# Patient Record
Sex: FEMALE | Race: WHITE | NOT HISPANIC OR LATINO | Employment: UNEMPLOYED | URBAN - NONMETROPOLITAN AREA
[De-identification: names, ages, dates, MRNs, and addresses within clinical notes are randomized per-mention and may not be internally consistent; named-entity substitution may affect disease eponyms.]

---

## 2017-01-18 ENCOUNTER — OFFICE VISIT (OUTPATIENT)
Dept: FAMILY MEDICINE CLINIC | Facility: CLINIC | Age: 42
End: 2017-01-18

## 2017-01-18 VITALS
DIASTOLIC BLOOD PRESSURE: 77 MMHG | OXYGEN SATURATION: 99 % | WEIGHT: 155.2 LBS | BODY MASS INDEX: 24.94 KG/M2 | HEIGHT: 66 IN | TEMPERATURE: 98.6 F | HEART RATE: 80 BPM | SYSTOLIC BLOOD PRESSURE: 123 MMHG

## 2017-01-18 DIAGNOSIS — N30.90 CYSTITIS: Primary | ICD-10-CM

## 2017-01-18 LAB
BILIRUB BLD-MCNC: NEGATIVE MG/DL
COLOR UR: ABNORMAL
GLUCOSE UR STRIP-MCNC: NEGATIVE MG/DL
KETONES UR QL: NEGATIVE
LEUKOCYTE EST, POC: NEGATIVE
NITRITE UR-MCNC: NEGATIVE MG/ML
PH UR: 5.5 [PH] (ref 5–8)
PROT UR STRIP-MCNC: NEGATIVE MG/DL
RBC # UR STRIP: ABNORMAL /UL
SP GR UR: 1.01 (ref 1–1.03)
UROBILINOGEN UR QL: NORMAL

## 2017-01-18 PROCEDURE — 99213 OFFICE O/P EST LOW 20 MIN: CPT | Performed by: NURSE PRACTITIONER

## 2017-01-18 PROCEDURE — 87086 URINE CULTURE/COLONY COUNT: CPT | Performed by: NURSE PRACTITIONER

## 2017-01-18 PROCEDURE — 81003 URINALYSIS AUTO W/O SCOPE: CPT | Performed by: NURSE PRACTITIONER

## 2017-01-18 RX ORDER — LIDOCAINE AND PRILOCAINE 25; 25 MG/G; MG/G
1 CREAM TOPICAL 2 TIMES DAILY
COMMUNITY
Start: 2016-12-30 | End: 2018-04-16

## 2017-01-18 RX ORDER — FERROUS SULFATE TAB EC 324 MG (65 MG FE EQUIVALENT) 324 (65 FE) MG
325 TABLET DELAYED RESPONSE ORAL DAILY
Refills: 6 | COMMUNITY
Start: 2017-01-12 | End: 2018-04-16

## 2017-01-18 RX ORDER — PREDNISONE 10 MG/1
10 TABLET ORAL DAILY
Refills: 0 | COMMUNITY
Start: 2017-01-12 | End: 2017-05-01

## 2017-01-18 RX ORDER — MAGNESIUM 200 MG
1 TABLET ORAL
Refills: 12 | COMMUNITY
Start: 2017-01-12 | End: 2018-04-16

## 2017-01-18 RX ORDER — CIPROFLOXACIN 500 MG/1
500 TABLET, FILM COATED ORAL 2 TIMES DAILY
Qty: 6 TABLET | Refills: 0 | Status: SHIPPED | OUTPATIENT
Start: 2017-01-18 | End: 2017-05-01

## 2017-01-18 NOTE — MR AVS SNAPSHOT
Betty Powers   1/18/2017 1:20 PM   Office Visit    Dept Phone:  [phone]   Encounter #:  [id number]    Provider:  OCTAVIA Roach   Department:  [facility]                Your Full Care Plan              Today's Medication Changes          These changes are accurate as of: 1/18/17  1:56 PM.  If you have any questions, ask your nurse or doctor.               New Medication(s)Ordered:     ciprofloxacin 500 MG tablet   Commonly known as:  CIPRO   Take 1 tablet by mouth 2 (Two) Times a Day.            Where to Get Your Medications      These medications were sent to [place], [place] - [address] [place] [address] - [phone]  - [phone] [address] [address]     Phone:  [phone]     ciprofloxacin 500 MG tablet                  Your Updated Medication List          This list is accurate as of: 1/18/17  1:56 PM.  Always use your most recent med list.                ACE ELBOW STRAP ONE SIZE misc   1 each Daily.       ciprofloxacin 500 MG tablet   Commonly known as:  CIPRO   Take 1 tablet by mouth 2 (Two) Times a Day.       folic acid 1 MG tablet   Commonly known as:  FOLVITE   Take 4 tablets by mouth Daily.       loratadine-pseudoephedrine 5-120 MG per 12 hr tablet   Commonly known as:  CLARITIN-D 12 HOUR   Take 1 tablet by mouth 2 (Two) Times a Day.               We Performed the Following     POC Urinalysis Dipstick, Automated       You Were Diagnosed With        Codes Comments    Cystitis    -  Primary ICD-10-CM: N30.90  ICD-9-CM: 595.9       Medications to be Given to You by a Medical Professional     Due       Frequency    10/13/2016 dexamethasone (DECADRON) injection 4 mg  Once      Instructions     None    Patient Instructions History      Upcoming Appointments     Visit Type Date Time Department    OFFICE VISIT 1/18/2017  1:20 PM [facility]    FOLLOW UP 1/23/2017  3:00 PM [facility]      MyChart Signup     Our  "records indicate that you have declined [facility] MyChart signup. If you would like to sign up for [employer], please email [email or web address] or call [phone] to obtain an activation code.             Other Info from Your Visit           Your Appointments     Jan 23, 2017  3:00 PM EST   Follow Up with OCTAVIA Roach   [facility] (--)    [address]  [facility] [address] [phone]           Arrive 15 minutes prior to appointment.              Allergies     No Known Allergies      Reason for Visit     Urinary Tract Infection burning with urination      Vital Signs     Blood Pressure Pulse Temperature Height Weight Oxygen Saturation    123/77 80 98.6 °F (37 °C) (Tympanic) 66\" (167.6 cm) 155 lb 3.2 oz (70.4 kg) 99%    Body Mass Index Smoking Status                25.05 kg/m2 Never Smoker          Problems and Diagnoses Noted     Inflammation of bladder    -  Primary      Results     POC Urinalysis Dipstick, Automated      Component Value Standard Range & Units    Color Dark Yellow Yellow, Straw, Dark Yellow, Denisse    Glucose, UA Negative Negative, 1000 mg/dL (3+) mg/dL    Bilirubin Negative Negative    Ketones, UA Negative Negative    Specific Gravity  1.015 1.005 - 1.030    Blood, UA Small Negative    pH, Urine 5.5 5.0 - 8.0    Protein, POC Negative Negative mg/dL    Urobilinogen, UA Normal Normal    Leukocytes Negative Negative    Nitrite, UA Negative Negative                    "

## 2017-01-18 NOTE — PROGRESS NOTES
Subjective   Betty Powers is a 41 y.o. female.     Urinary Tract Infection    This is a recurrent problem. The current episode started in the past 7 days. The problem occurs intermittently. The problem has been unchanged (took home test yesterday positive). The quality of the pain is described as aching and burning. The pain is at a severity of 5/10. The pain is moderate. There has been no fever. She is sexually active. There is no history of pyelonephritis. Associated symptoms include frequency, hematuria, hesitancy and urgency. Pertinent negatives include no chills, nausea, possible pregnancy, sweats or vomiting. She has tried increased fluids for the symptoms. The treatment provided no relief. Her past medical history is significant for recurrent UTIs.      The following portions of the patient's history were reviewed and updated as appropriate: allergies, current medications, past family history, past medical history, past social history, past surgical history and problem list.      Review of Systems   Constitutional: Negative for chills.   Respiratory: Negative.    Cardiovascular: Negative.    Gastrointestinal: Negative for nausea and vomiting.   Genitourinary: Positive for frequency, hematuria, hesitancy and urgency.   Musculoskeletal: Negative.    Skin: Negative.    All other systems reviewed and are negative.      Procedures    Objective   Physical Exam   Constitutional: She is oriented to person, place, and time. She appears well-developed and well-nourished. No distress.   HENT:   Head: Normocephalic.   Cardiovascular: Normal rate, regular rhythm and normal heart sounds.    No murmur heard.  Pulmonary/Chest: Effort normal and breath sounds normal.   Abdominal: Soft. Bowel sounds are normal. She exhibits no distension and no mass. There is tenderness in the left upper quadrant. No hernia.   Neurological: She is alert and oriented to person, place, and time.   Skin: Skin is warm and dry. She is not  diaphoretic.   Psychiatric: She has a normal mood and affect. Her behavior is normal.   Nursing note and vitals reviewed.      Assessment/Plan   Discussed with patient impression and plan, patient verbalizes understanding  Betty was seen today for urinary tract infection.    Diagnoses and all orders for this visit:    Cystitis  -     POC Urinalysis Dipstick, Automated  -     Urine Culture (Clean Catch); Future    Other orders  -     ciprofloxacin (CIPRO) 500 MG tablet; Take 1 tablet by mouth 2 (Two) Times a Day.

## 2017-01-21 LAB — BACTERIA SPEC AEROBE CULT: NORMAL

## 2017-02-10 ENCOUNTER — TRANSCRIBE ORDERS (OUTPATIENT)
Dept: ADMINISTRATIVE | Facility: HOSPITAL | Age: 42
End: 2017-02-10

## 2017-02-10 DIAGNOSIS — D58.0 HEREDITARY SPHEROCYTOSIS (HCC): Primary | ICD-10-CM

## 2017-03-15 ENCOUNTER — HOSPITAL ENCOUNTER (OUTPATIENT)
Dept: ULTRASOUND IMAGING | Facility: HOSPITAL | Age: 42
Discharge: HOME | End: 2017-03-15
Attending: INTERNAL MEDICINE | Admitting: INTERNAL MEDICINE

## 2017-03-15 DIAGNOSIS — D58.0 HEREDITARY SPHEROCYTOSIS (HCC): ICD-10-CM

## 2017-03-15 PROCEDURE — 76700 US EXAM ABDOM COMPLETE: CPT | Performed by: RADIOLOGY

## 2017-03-15 PROCEDURE — 76700 US EXAM ABDOM COMPLETE: CPT

## 2017-04-30 ENCOUNTER — HOSPITAL ENCOUNTER (EMERGENCY)
Facility: HOSPITAL | Age: 42
Discharge: HOME | End: 2017-04-30
Attending: EMERGENCY MEDICINE | Admitting: EMERGENCY MEDICINE

## 2017-04-30 ENCOUNTER — APPOINTMENT (OUTPATIENT)
Dept: CT IMAGING | Facility: HOSPITAL | Age: 42
End: 2017-04-30

## 2017-04-30 VITALS
BODY MASS INDEX: 23.3 KG/M2 | OXYGEN SATURATION: 99 % | HEIGHT: 66 IN | WEIGHT: 145 LBS | TEMPERATURE: 97 F | SYSTOLIC BLOOD PRESSURE: 121 MMHG | RESPIRATION RATE: 16 BRPM | HEART RATE: 88 BPM | DIASTOLIC BLOOD PRESSURE: 85 MMHG

## 2017-04-30 DIAGNOSIS — R10.84 GENERALIZED ABDOMINAL PAIN: Primary | ICD-10-CM

## 2017-04-30 LAB
027 TOXIN: (no result)
ALBUMIN SERPL-MCNC: 4.6 G/DL (ref 3.5–5)
ALBUMIN/GLOB SERPL: 1.5 G/DL (ref 1.5–2.5)
ALP SERPL-CCNC: 61 U/L (ref 35–104)
ALT SERPL W P-5'-P-CCNC: 11 U/L (ref 10–36)
ANION GAP SERPL CALCULATED.3IONS-SCNC: 3.7 MMOL/L (ref 3.6–11.2)
AST SERPL-CCNC: 22 U/L (ref 10–30)
BASOPHILS # BLD AUTO: 0.02 10*3/MM3 (ref 0–0.3)
BASOPHILS NFR BLD AUTO: 0.3 % (ref 0–2)
BILIRUB SERPL-MCNC: 2.4 MG/DL (ref 0.2–1.8)
BUN BLD-MCNC: 6 MG/DL (ref 7–21)
BUN/CREAT SERPL: 9.1 (ref 7–25)
C DIFF TOX GENS STL QL NAA+PROBE: NEGATIVE
CALCIUM SPEC-SCNC: 9.4 MG/DL (ref 7.7–10)
CHLORIDE SERPL-SCNC: 108 MMOL/L (ref 99–112)
CO2 SERPL-SCNC: 27.3 MMOL/L (ref 24.3–31.9)
CREAT BLD-MCNC: 0.66 MG/DL (ref 0.43–1.29)
DEPRECATED RDW RBC AUTO: 51.1 FL (ref 37–54)
EOSINOPHIL # BLD AUTO: 0.05 10*3/MM3 (ref 0–0.7)
EOSINOPHIL NFR BLD AUTO: 0.7 % (ref 0–5)
ERYTHROCYTE [DISTWIDTH] IN BLOOD BY AUTOMATED COUNT: 15.6 % (ref 11.5–14.5)
GFR SERPL CREATININE-BSD FRML MDRD: 99 ML/MIN/1.73
GLOBULIN UR ELPH-MCNC: 3.1 GM/DL
GLUCOSE BLD-MCNC: 105 MG/DL (ref 70–110)
H PYLORI IGG SER IA-ACNC: NEGATIVE
HCG SERPL QL: NEGATIVE
HCT VFR BLD AUTO: 37.2 % (ref 37–47)
HGB BLD-MCNC: 13.4 G/DL (ref 12–16)
IMM GRANULOCYTES # BLD: 0.01 10*3/MM3 (ref 0–0.03)
IMM GRANULOCYTES NFR BLD: 0.1 % (ref 0–0.5)
LYMPHOCYTES # BLD AUTO: 1.56 10*3/MM3 (ref 1–3)
LYMPHOCYTES NFR BLD AUTO: 22.2 % (ref 21–51)
MCH RBC QN AUTO: 32.9 PG (ref 27–33)
MCHC RBC AUTO-ENTMCNC: 36 G/DL (ref 33–37)
MCV RBC AUTO: 91.4 FL (ref 80–94)
MONOCYTES # BLD AUTO: 0.54 10*3/MM3 (ref 0.1–0.9)
MONOCYTES NFR BLD AUTO: 7.7 % (ref 0–10)
NEUTROPHILS # BLD AUTO: 4.86 10*3/MM3 (ref 1.4–6.5)
NEUTROPHILS NFR BLD AUTO: 69 % (ref 30–70)
OSMOLALITY SERPL CALC.SUM OF ELEC: 275.5 MOSM/KG (ref 273–305)
PLATELET # BLD AUTO: 182 10*3/MM3 (ref 130–400)
PMV BLD AUTO: 10.7 FL (ref 6–10)
POTASSIUM BLD-SCNC: 3.4 MMOL/L (ref 3.5–5.3)
PROT SERPL-MCNC: 7.7 G/DL (ref 6–8)
RBC # BLD AUTO: 4.07 10*6/MM3 (ref 4.2–5.4)
RV AG STL QL IA: NEGATIVE
SODIUM BLD-SCNC: 139 MMOL/L (ref 135–153)
WBC NRBC COR # BLD: 7.04 10*3/MM3 (ref 4.5–12.5)

## 2017-04-30 PROCEDURE — 99284 EMERGENCY DEPT VISIT MOD MDM: CPT

## 2017-04-30 PROCEDURE — 25010000002 ONDANSETRON PER 1 MG: Performed by: PHYSICIAN ASSISTANT

## 2017-04-30 PROCEDURE — 74177 CT ABD & PELVIS W/CONTRAST: CPT | Performed by: RADIOLOGY

## 2017-04-30 PROCEDURE — 87899 AGENT NOS ASSAY W/OPTIC: CPT | Performed by: PHYSICIAN ASSISTANT

## 2017-04-30 PROCEDURE — 80053 COMPREHEN METABOLIC PANEL: CPT | Performed by: PHYSICIAN ASSISTANT

## 2017-04-30 PROCEDURE — 87425 ROTAVIRUS AG IA: CPT | Performed by: PHYSICIAN ASSISTANT

## 2017-04-30 PROCEDURE — 87046 STOOL CULTR AEROBIC BACT EA: CPT | Performed by: PHYSICIAN ASSISTANT

## 2017-04-30 PROCEDURE — 25010000002 DICYCLOMINE PER 20 MG: Performed by: PHYSICIAN ASSISTANT

## 2017-04-30 PROCEDURE — 87045 FECES CULTURE AEROBIC BACT: CPT | Performed by: PHYSICIAN ASSISTANT

## 2017-04-30 PROCEDURE — 74177 CT ABD & PELVIS W/CONTRAST: CPT

## 2017-04-30 PROCEDURE — 96372 THER/PROPH/DIAG INJ SC/IM: CPT

## 2017-04-30 PROCEDURE — 86677 HELICOBACTER PYLORI ANTIBODY: CPT | Performed by: PHYSICIAN ASSISTANT

## 2017-04-30 PROCEDURE — 96361 HYDRATE IV INFUSION ADD-ON: CPT

## 2017-04-30 PROCEDURE — 36415 COLL VENOUS BLD VENIPUNCTURE: CPT

## 2017-04-30 PROCEDURE — 85025 COMPLETE CBC W/AUTO DIFF WBC: CPT | Performed by: PHYSICIAN ASSISTANT

## 2017-04-30 PROCEDURE — 87493 C DIFF AMPLIFIED PROBE: CPT | Performed by: PHYSICIAN ASSISTANT

## 2017-04-30 PROCEDURE — 96374 THER/PROPH/DIAG INJ IV PUSH: CPT

## 2017-04-30 PROCEDURE — 84703 CHORIONIC GONADOTROPIN ASSAY: CPT | Performed by: PHYSICIAN ASSISTANT

## 2017-04-30 PROCEDURE — 0 IOPAMIDOL 61 % SOLUTION: Performed by: EMERGENCY MEDICINE

## 2017-04-30 RX ORDER — DICYCLOMINE HYDROCHLORIDE 10 MG/ML
20 INJECTION INTRAMUSCULAR ONCE
Status: COMPLETED | OUTPATIENT
Start: 2017-04-30 | End: 2017-04-30

## 2017-04-30 RX ORDER — ONDANSETRON 2 MG/ML
4 INJECTION INTRAMUSCULAR; INTRAVENOUS ONCE
Status: COMPLETED | OUTPATIENT
Start: 2017-04-30 | End: 2017-04-30

## 2017-04-30 RX ORDER — SODIUM CHLORIDE 0.9 % (FLUSH) 0.9 %
10 SYRINGE (ML) INJECTION AS NEEDED
Status: DISCONTINUED | OUTPATIENT
Start: 2017-04-30 | End: 2017-05-01 | Stop reason: HOSPADM

## 2017-04-30 RX ORDER — DICYCLOMINE HYDROCHLORIDE 10 MG/1
20 CAPSULE ORAL ONCE
Status: COMPLETED | OUTPATIENT
Start: 2017-04-30 | End: 2017-04-30

## 2017-04-30 RX ADMIN — DICYCLOMINE HYDROCHLORIDE 20 MG: 10 CAPSULE ORAL at 22:00

## 2017-04-30 RX ADMIN — SODIUM CHLORIDE 1000 ML: 9 INJECTION, SOLUTION INTRAVENOUS at 16:50

## 2017-04-30 RX ADMIN — DICYCLOMINE HYDROCHLORIDE 20 MG: 20 INJECTION, SOLUTION INTRAMUSCULAR at 16:55

## 2017-04-30 RX ADMIN — IOPAMIDOL 100 ML: 612 INJECTION, SOLUTION INTRAVENOUS at 20:21

## 2017-04-30 RX ADMIN — ONDANSETRON 4 MG: 2 INJECTION, SOLUTION INTRAMUSCULAR; INTRAVENOUS at 16:53

## 2017-05-01 ENCOUNTER — OFFICE VISIT (OUTPATIENT)
Dept: FAMILY MEDICINE CLINIC | Facility: CLINIC | Age: 42
End: 2017-05-01

## 2017-05-01 VITALS
WEIGHT: 156.4 LBS | OXYGEN SATURATION: 99 % | SYSTOLIC BLOOD PRESSURE: 100 MMHG | HEIGHT: 66 IN | BODY MASS INDEX: 25.13 KG/M2 | DIASTOLIC BLOOD PRESSURE: 68 MMHG | HEART RATE: 75 BPM

## 2017-05-01 DIAGNOSIS — R10.84 GENERALIZED ABDOMINAL PAIN: Primary | ICD-10-CM

## 2017-05-01 DIAGNOSIS — K59.00 CONSTIPATION, UNSPECIFIED CONSTIPATION TYPE: ICD-10-CM

## 2017-05-01 PROCEDURE — 99214 OFFICE O/P EST MOD 30 MIN: CPT | Performed by: NURSE PRACTITIONER

## 2017-05-01 RX ORDER — DICYCLOMINE HYDROCHLORIDE 10 MG/1
10 CAPSULE ORAL
Qty: 30 CAPSULE | Refills: 1 | Status: SHIPPED | OUTPATIENT
Start: 2017-05-01 | End: 2018-02-19

## 2017-05-01 RX ORDER — POLYETHYLENE GLYCOL 3350 17 G/17G
17 POWDER, FOR SOLUTION ORAL 2 TIMES DAILY
Qty: 100 EACH | Refills: 1 | Status: SHIPPED | OUTPATIENT
Start: 2017-05-01 | End: 2017-06-19 | Stop reason: SDUPTHER

## 2017-05-02 LAB — BACTERIA SPEC AEROBE CULT: NORMAL

## 2017-05-30 ENCOUNTER — OFFICE VISIT (OUTPATIENT)
Dept: FAMILY MEDICINE CLINIC | Facility: CLINIC | Age: 42
End: 2017-05-30

## 2017-05-30 VITALS
BODY MASS INDEX: 25.04 KG/M2 | HEIGHT: 66 IN | WEIGHT: 155.8 LBS | HEART RATE: 75 BPM | SYSTOLIC BLOOD PRESSURE: 112 MMHG | TEMPERATURE: 98.3 F | DIASTOLIC BLOOD PRESSURE: 74 MMHG | OXYGEN SATURATION: 98 %

## 2017-05-30 DIAGNOSIS — J02.9 SORE THROAT: Primary | ICD-10-CM

## 2017-05-30 LAB
EXPIRATION DATE: NORMAL
INTERNAL CONTROL: NORMAL
Lab: NORMAL
S PYO AG THROAT QL: NEGATIVE

## 2017-05-30 PROCEDURE — 99214 OFFICE O/P EST MOD 30 MIN: CPT | Performed by: NURSE PRACTITIONER

## 2017-05-30 PROCEDURE — 87880 STREP A ASSAY W/OPTIC: CPT | Performed by: NURSE PRACTITIONER

## 2017-05-30 RX ORDER — AMOXICILLIN 875 MG/1
875 TABLET, COATED ORAL 2 TIMES DAILY
Qty: 20 TABLET | Refills: 0 | Status: SHIPPED | OUTPATIENT
Start: 2017-05-30 | End: 2017-11-14

## 2017-06-19 RX ORDER — POLYETHYLENE GLYCOL 3350 17 G/17G
17 POWDER, FOR SOLUTION ORAL 2 TIMES DAILY
Qty: 100 EACH | Refills: 1 | Status: SHIPPED | OUTPATIENT
Start: 2017-06-19 | End: 2018-04-16

## 2017-08-02 ENCOUNTER — TRANSCRIBE ORDERS (OUTPATIENT)
Dept: ADMINISTRATIVE | Facility: HOSPITAL | Age: 42
End: 2017-08-02

## 2017-08-02 DIAGNOSIS — Z12.31 VISIT FOR SCREENING MAMMOGRAM: Primary | ICD-10-CM

## 2017-09-25 ENCOUNTER — HOSPITAL ENCOUNTER (OUTPATIENT)
Dept: MAMMOGRAPHY | Facility: HOSPITAL | Age: 42
Discharge: HOME | End: 2017-09-25
Admitting: NURSE PRACTITIONER

## 2017-09-25 DIAGNOSIS — Z12.31 VISIT FOR SCREENING MAMMOGRAM: ICD-10-CM

## 2017-09-25 PROCEDURE — 77063 BREAST TOMOSYNTHESIS BI: CPT | Performed by: RADIOLOGY

## 2017-09-25 PROCEDURE — 77067 SCR MAMMO BI INCL CAD: CPT | Performed by: RADIOLOGY

## 2017-09-25 PROCEDURE — 77063 BREAST TOMOSYNTHESIS BI: CPT

## 2017-09-25 PROCEDURE — G0202 SCR MAMMO BI INCL CAD: HCPCS

## 2017-11-14 ENCOUNTER — OFFICE VISIT (OUTPATIENT)
Dept: FAMILY MEDICINE CLINIC | Facility: CLINIC | Age: 42
End: 2017-11-14

## 2017-11-14 VITALS
OXYGEN SATURATION: 99 % | WEIGHT: 160 LBS | SYSTOLIC BLOOD PRESSURE: 116 MMHG | BODY MASS INDEX: 25.71 KG/M2 | HEART RATE: 71 BPM | HEIGHT: 66 IN | DIASTOLIC BLOOD PRESSURE: 77 MMHG

## 2017-11-14 DIAGNOSIS — R17 JAUNDICE: ICD-10-CM

## 2017-11-14 DIAGNOSIS — D58.0 HEREDITARY SPHEROCYTOSIS (HCC): Primary | ICD-10-CM

## 2017-11-14 DIAGNOSIS — R19.8 FULLNESS OF ABDOMEN: ICD-10-CM

## 2017-11-14 DIAGNOSIS — M79.674 PAIN OF TOE OF RIGHT FOOT: ICD-10-CM

## 2017-11-14 LAB
ALBUMIN SERPL-MCNC: 4.8 G/DL (ref 3.5–5)
ALBUMIN/GLOB SERPL: 1.5 G/DL (ref 1.5–2.5)
ALP SERPL-CCNC: 75 U/L (ref 35–104)
ALT SERPL W P-5'-P-CCNC: 9 U/L (ref 10–36)
ANION GAP SERPL CALCULATED.3IONS-SCNC: 11.1 MMOL/L (ref 3.6–11.2)
APTT PPP: 30.7 SECONDS (ref 23.8–36.1)
AST SERPL-CCNC: 21 U/L (ref 10–30)
BASOPHILS # BLD AUTO: 0.01 10*3/MM3 (ref 0–0.3)
BASOPHILS NFR BLD AUTO: 0.1 % (ref 0–2)
BILIRUB SERPL-MCNC: 3.3 MG/DL (ref 0.2–1.8)
BUN BLD-MCNC: 7 MG/DL (ref 7–21)
BUN/CREAT SERPL: 9.7 (ref 7–25)
CALCIUM SPEC-SCNC: 9.4 MG/DL (ref 7.7–10)
CHLORIDE SERPL-SCNC: 106 MMOL/L (ref 99–112)
CO2 SERPL-SCNC: 26.9 MMOL/L (ref 24.3–31.9)
CREAT BLD-MCNC: 0.72 MG/DL (ref 0.43–1.29)
DEPRECATED RDW RBC AUTO: 53.7 FL (ref 37–54)
EOSINOPHIL # BLD AUTO: 0.11 10*3/MM3 (ref 0–0.7)
EOSINOPHIL NFR BLD AUTO: 1.5 % (ref 0–5)
ERYTHROCYTE [DISTWIDTH] IN BLOOD BY AUTOMATED COUNT: 16.5 % (ref 11.5–14.5)
GFR SERPL CREATININE-BSD FRML MDRD: 89 ML/MIN/1.73
GLOBULIN UR ELPH-MCNC: 3.2 GM/DL
GLUCOSE BLD-MCNC: 91 MG/DL (ref 70–110)
HCT VFR BLD AUTO: 36.5 % (ref 37–47)
HGB BLD-MCNC: 12.9 G/DL (ref 12–16)
IMM GRANULOCYTES # BLD: 0.02 10*3/MM3 (ref 0–0.03)
IMM GRANULOCYTES NFR BLD: 0.3 % (ref 0–0.5)
INR PPP: 0.94 (ref 0.9–1.1)
LYMPHOCYTES # BLD AUTO: 1.98 10*3/MM3 (ref 1–3)
LYMPHOCYTES NFR BLD AUTO: 26.7 % (ref 21–51)
MCH RBC QN AUTO: 33 PG (ref 27–33)
MCHC RBC AUTO-ENTMCNC: 35.3 G/DL (ref 33–37)
MCV RBC AUTO: 93.4 FL (ref 80–94)
MONOCYTES # BLD AUTO: 0.49 10*3/MM3 (ref 0.1–0.9)
MONOCYTES NFR BLD AUTO: 6.6 % (ref 0–10)
NEUTROPHILS # BLD AUTO: 4.8 10*3/MM3 (ref 1.4–6.5)
NEUTROPHILS NFR BLD AUTO: 64.8 % (ref 30–70)
OSMOLALITY SERPL CALC.SUM OF ELEC: 284.4 MOSM/KG (ref 273–305)
PLATELET # BLD AUTO: 254 10*3/MM3 (ref 130–400)
PMV BLD AUTO: 11 FL (ref 6–10)
POTASSIUM BLD-SCNC: 3.4 MMOL/L (ref 3.5–5.3)
PROT SERPL-MCNC: 8 G/DL (ref 6–8)
PROTHROMBIN TIME: 12.6 SECONDS (ref 11–15.4)
RBC # BLD AUTO: 3.91 10*6/MM3 (ref 4.2–5.4)
SODIUM BLD-SCNC: 144 MMOL/L (ref 135–153)
WBC NRBC COR # BLD: 7.41 10*3/MM3 (ref 4.5–12.5)

## 2017-11-14 PROCEDURE — 99214 OFFICE O/P EST MOD 30 MIN: CPT | Performed by: NURSE PRACTITIONER

## 2017-11-14 PROCEDURE — 85610 PROTHROMBIN TIME: CPT | Performed by: NURSE PRACTITIONER

## 2017-11-14 PROCEDURE — 85025 COMPLETE CBC W/AUTO DIFF WBC: CPT | Performed by: NURSE PRACTITIONER

## 2017-11-14 PROCEDURE — 85730 THROMBOPLASTIN TIME PARTIAL: CPT | Performed by: NURSE PRACTITIONER

## 2017-11-14 PROCEDURE — 80053 COMPREHEN METABOLIC PANEL: CPT | Performed by: NURSE PRACTITIONER

## 2017-11-14 RX ORDER — ASPIRIN 81 MG/1
81 TABLET ORAL DAILY
COMMUNITY
End: 2023-02-22

## 2017-11-15 NOTE — PROGRESS NOTES
"Subjective   Betty Powers is a 42 y.o. female.   Chief Compliant: The patient presents with Toe Pain (Right great toe pain; possible blood clot?)    History of Present Illness Presents today with episode of intense toe pain over the weekend. Known history of spherocytosis who she follows with hematology.  # days ago awoke suddenly in the night with intense right great toe pain.  Pain improved with ibuprofen.  Now second toenail is blue.  Similar episode last year with same foot 3 rd and 4 th digit turned blue and she eventually lost her nails.  States over the weekend also she felt her color worsened as she had experienced juandice in the past.  Otherwise feels fine and stays very busy caring for a handicap son.    Patient also states for several months her stomach feels bloated and at times doesn't feel she can get a good breath.       The following portions of the patient's history were reviewed and updated as appropriate: allergies, current medications, past family history, past medical history, past social history, past surgical history and problem list.      Review of Systems   Constitutional: Negative.    HENT: Negative.    Respiratory: Positive for shortness of breath (abdomen feels full and tight which increases her ability to get a deep breath.).    Cardiovascular: Negative.    Gastrointestinal: Positive for abdominal distention. Negative for constipation, diarrhea, nausea and vomiting.   Genitourinary: Negative.    Musculoskeletal: Positive for arthralgias.   Neurological: Negative.    Hematological: Negative.    Psychiatric/Behavioral: Negative.    All other systems reviewed and are negative.      Procedures    Vitals: Blood pressure 116/77, pulse 71, height 66\" (167.6 cm), weight 160 lb (72.6 kg), SpO2 99 %.     Allergies: No Known Allergies       Objective   Physical Exam   Constitutional: She is oriented to person, place, and time. She appears well-developed and well-nourished. No distress.   HENT: "   Head: Normocephalic.   Eyes:   Juandice sclera       Neck: Neck supple. No thyromegaly present.   Cardiovascular: Normal rate, regular rhythm and normal heart sounds.    No murmur heard.  Pulmonary/Chest: Effort normal and breath sounds normal. No respiratory distress. She has no wheezes.   Abdominal: Soft. Bowel sounds are normal. She exhibits distension. She exhibits no mass. There is no tenderness. No hernia.   Neurological: She is alert and oriented to person, place, and time.   Skin: Skin is warm and dry. She is not diaphoretic.   Right foot second digit with small area of ecchymotic color to edge of 2 nd nail bed.  Good pulsed no tender, no redness.    Psychiatric: She has a normal mood and affect. Her behavior is normal.   Nursing note and vitals reviewed.      Assessment/Plan   Discussed with patient impression and plan, patient verbalizes understanding.  Will follow with labs and with hematology as well.  Will follow up after ultrasound or sooner if needed.   Betty was seen today for toe pain.    Diagnoses and all orders for this visit:    Hereditary spherocytosis  -     CBC & Differential  -     Comprehensive Metabolic Panel  -     US Abdomen Complete  -     Protime-INR; Future  -     APTT; Future  -     Protime-INR  -     APTT  -     CBC Auto Differential  -     Osmolality, Calculated; Future  -     Osmolality, Calculated    Pain of toe of right foot  -     CBC & Differential  -     Comprehensive Metabolic Panel  -     US Abdomen Complete  -     Protime-INR; Future  -     APTT; Future  -     Protime-INR  -     APTT  -     CBC Auto Differential  -     Osmolality, Calculated; Future  -     Osmolality, Calculated    Fullness of abdomen    Jaundice

## 2017-11-20 ENCOUNTER — HOSPITAL ENCOUNTER (OUTPATIENT)
Dept: ULTRASOUND IMAGING | Facility: HOSPITAL | Age: 42
Discharge: HOME | End: 2017-11-20
Admitting: NURSE PRACTITIONER

## 2017-11-20 PROCEDURE — 76700 US EXAM ABDOM COMPLETE: CPT | Performed by: RADIOLOGY

## 2017-11-20 PROCEDURE — 76700 US EXAM ABDOM COMPLETE: CPT

## 2017-11-21 ENCOUNTER — TELEPHONE (OUTPATIENT)
Dept: FAMILY MEDICINE CLINIC | Facility: CLINIC | Age: 42
End: 2017-11-21

## 2017-11-21 NOTE — TELEPHONE ENCOUNTER
----- Message from OCTAVIA Roach sent at 11/21/2017  1:28 PM EST -----  Tell Betty other than splenomegaly us ok.  Keep hematology appt      Patient notified & verbalized understanding.

## 2018-02-19 ENCOUNTER — HOSPITAL ENCOUNTER (EMERGENCY)
Facility: HOSPITAL | Age: 43
Discharge: HOME | End: 2018-02-19
Attending: EMERGENCY MEDICINE | Admitting: EMERGENCY MEDICINE

## 2018-02-19 ENCOUNTER — APPOINTMENT (OUTPATIENT)
Dept: CT IMAGING | Facility: HOSPITAL | Age: 43
End: 2018-02-19

## 2018-02-19 VITALS
WEIGHT: 155 LBS | HEART RATE: 87 BPM | HEIGHT: 66 IN | SYSTOLIC BLOOD PRESSURE: 138 MMHG | BODY MASS INDEX: 24.91 KG/M2 | TEMPERATURE: 98.4 F | OXYGEN SATURATION: 99 % | DIASTOLIC BLOOD PRESSURE: 82 MMHG | RESPIRATION RATE: 18 BRPM

## 2018-02-19 DIAGNOSIS — R19.7 ABDOMINAL PAIN, VOMITING, AND DIARRHEA: Primary | ICD-10-CM

## 2018-02-19 DIAGNOSIS — E87.6 HYPOKALEMIA: ICD-10-CM

## 2018-02-19 DIAGNOSIS — R10.9 ABDOMINAL PAIN, VOMITING, AND DIARRHEA: Primary | ICD-10-CM

## 2018-02-19 DIAGNOSIS — R11.10 ABDOMINAL PAIN, VOMITING, AND DIARRHEA: Primary | ICD-10-CM

## 2018-02-19 LAB
ALBUMIN SERPL-MCNC: 4.4 G/DL (ref 3.5–5)
ALBUMIN/GLOB SERPL: 1.5 G/DL (ref 1.5–2.5)
ALP SERPL-CCNC: 71 U/L (ref 35–104)
ALT SERPL W P-5'-P-CCNC: 26 U/L (ref 10–36)
AMYLASE SERPL-CCNC: 24 U/L (ref 28–100)
ANION GAP SERPL CALCULATED.3IONS-SCNC: 4.5 MMOL/L (ref 3.6–11.2)
AST SERPL-CCNC: 36 U/L (ref 10–30)
BACTERIA UR QL AUTO: NORMAL /HPF
BASOPHILS # BLD AUTO: 0.03 10*3/MM3 (ref 0–0.3)
BASOPHILS NFR BLD AUTO: 0.6 % (ref 0–2)
BILIRUB SERPL-MCNC: 9.1 MG/DL (ref 0.2–1.8)
BILIRUB UR QL STRIP: NEGATIVE
BUN BLD-MCNC: 7 MG/DL (ref 7–21)
BUN/CREAT SERPL: 10.6 (ref 7–25)
CALCIUM SPEC-SCNC: 9.1 MG/DL (ref 7.7–10)
CHLORIDE SERPL-SCNC: 105 MMOL/L (ref 99–112)
CLARITY UR: CLEAR
CO2 SERPL-SCNC: 31.5 MMOL/L (ref 24.3–31.9)
COLOR UR: YELLOW
CREAT BLD-MCNC: 0.66 MG/DL (ref 0.43–1.29)
D-LACTATE SERPL-SCNC: 0.6 MMOL/L (ref 0.5–2)
DEPRECATED RDW RBC AUTO: 51.6 FL (ref 37–54)
EOSINOPHIL # BLD AUTO: 0.14 10*3/MM3 (ref 0–0.7)
EOSINOPHIL NFR BLD AUTO: 2.6 % (ref 0–5)
ERYTHROCYTE [DISTWIDTH] IN BLOOD BY AUTOMATED COUNT: 16.2 % (ref 11.5–14.5)
GFR SERPL CREATININE-BSD FRML MDRD: 98 ML/MIN/1.73
GLOBULIN UR ELPH-MCNC: 2.9 GM/DL
GLUCOSE BLD-MCNC: 105 MG/DL (ref 70–110)
GLUCOSE UR STRIP-MCNC: NEGATIVE MG/DL
HCG SERPL QL: NEGATIVE
HCT VFR BLD AUTO: 29.3 % (ref 37–47)
HGB BLD-MCNC: 10.6 G/DL (ref 12–16)
HGB UR QL STRIP.AUTO: NEGATIVE
HOLD SPECIMEN: NORMAL
HOLD SPECIMEN: NORMAL
HYALINE CASTS UR QL AUTO: NORMAL /LPF
IMM GRANULOCYTES # BLD: 0.02 10*3/MM3 (ref 0–0.03)
IMM GRANULOCYTES NFR BLD: 0.4 % (ref 0–0.5)
KETONES UR QL STRIP: NEGATIVE
LEUKOCYTE ESTERASE UR QL STRIP.AUTO: ABNORMAL
LIPASE SERPL-CCNC: 33 U/L (ref 13–60)
LYMPHOCYTES # BLD AUTO: 1.04 10*3/MM3 (ref 1–3)
LYMPHOCYTES NFR BLD AUTO: 19.1 % (ref 21–51)
MCH RBC QN AUTO: 33.1 PG (ref 27–33)
MCHC RBC AUTO-ENTMCNC: 36.2 G/DL (ref 33–37)
MCV RBC AUTO: 91.6 FL (ref 80–94)
MONOCYTES # BLD AUTO: 0.52 10*3/MM3 (ref 0.1–0.9)
MONOCYTES NFR BLD AUTO: 9.5 % (ref 0–10)
NEUTROPHILS # BLD AUTO: 3.7 10*3/MM3 (ref 1.4–6.5)
NEUTROPHILS NFR BLD AUTO: 67.8 % (ref 30–70)
NITRITE UR QL STRIP: NEGATIVE
OSMOLALITY SERPL CALC.SUM OF ELEC: 279.6 MOSM/KG (ref 273–305)
PH UR STRIP.AUTO: 6.5 [PH] (ref 5–8)
PLATELET # BLD AUTO: 210 10*3/MM3 (ref 130–400)
PMV BLD AUTO: 10.6 FL (ref 6–10)
POTASSIUM BLD-SCNC: 2.8 MMOL/L (ref 3.5–5.3)
PROT SERPL-MCNC: 7.3 G/DL (ref 6–8)
PROT UR QL STRIP: NEGATIVE
RBC # BLD AUTO: 3.2 10*6/MM3 (ref 4.2–5.4)
RBC # UR: NORMAL /HPF
REF LAB TEST METHOD: NORMAL
SODIUM BLD-SCNC: 141 MMOL/L (ref 135–153)
SP GR UR STRIP: <=1.005 (ref 1–1.03)
SQUAMOUS #/AREA URNS HPF: NORMAL /HPF
UROBILINOGEN UR QL STRIP: ABNORMAL
WBC NRBC COR # BLD: 5.45 10*3/MM3 (ref 4.5–12.5)
WBC UR QL AUTO: NORMAL /HPF
WHOLE BLOOD HOLD SPECIMEN: NORMAL
WHOLE BLOOD HOLD SPECIMEN: NORMAL

## 2018-02-19 PROCEDURE — 25010000002 ONDANSETRON PER 1 MG: Performed by: EMERGENCY MEDICINE

## 2018-02-19 PROCEDURE — 87086 URINE CULTURE/COLONY COUNT: CPT | Performed by: EMERGENCY MEDICINE

## 2018-02-19 PROCEDURE — 74177 CT ABD & PELVIS W/CONTRAST: CPT | Performed by: RADIOLOGY

## 2018-02-19 PROCEDURE — 96375 TX/PRO/DX INJ NEW DRUG ADDON: CPT

## 2018-02-19 PROCEDURE — 96374 THER/PROPH/DIAG INJ IV PUSH: CPT

## 2018-02-19 PROCEDURE — 25010000002 MORPHINE PER 10 MG: Performed by: EMERGENCY MEDICINE

## 2018-02-19 PROCEDURE — 83690 ASSAY OF LIPASE: CPT | Performed by: EMERGENCY MEDICINE

## 2018-02-19 PROCEDURE — 96361 HYDRATE IV INFUSION ADD-ON: CPT

## 2018-02-19 PROCEDURE — 80053 COMPREHEN METABOLIC PANEL: CPT | Performed by: EMERGENCY MEDICINE

## 2018-02-19 PROCEDURE — 74177 CT ABD & PELVIS W/CONTRAST: CPT

## 2018-02-19 PROCEDURE — 83605 ASSAY OF LACTIC ACID: CPT | Performed by: EMERGENCY MEDICINE

## 2018-02-19 PROCEDURE — 85025 COMPLETE CBC W/AUTO DIFF WBC: CPT | Performed by: EMERGENCY MEDICINE

## 2018-02-19 PROCEDURE — 99283 EMERGENCY DEPT VISIT LOW MDM: CPT

## 2018-02-19 PROCEDURE — 81001 URINALYSIS AUTO W/SCOPE: CPT | Performed by: EMERGENCY MEDICINE

## 2018-02-19 PROCEDURE — 84703 CHORIONIC GONADOTROPIN ASSAY: CPT | Performed by: EMERGENCY MEDICINE

## 2018-02-19 PROCEDURE — 82150 ASSAY OF AMYLASE: CPT | Performed by: EMERGENCY MEDICINE

## 2018-02-19 PROCEDURE — 0 IOPAMIDOL 61 % SOLUTION: Performed by: EMERGENCY MEDICINE

## 2018-02-19 RX ORDER — DICYCLOMINE HYDROCHLORIDE 10 MG/1
10 CAPSULE ORAL
Qty: 30 CAPSULE | Refills: 0 | Status: SHIPPED | OUTPATIENT
Start: 2018-02-19 | End: 2018-04-16

## 2018-02-19 RX ORDER — MORPHINE SULFATE 2 MG/ML
2 INJECTION, SOLUTION INTRAMUSCULAR; INTRAVENOUS ONCE
Status: COMPLETED | OUTPATIENT
Start: 2018-02-19 | End: 2018-02-19

## 2018-02-19 RX ORDER — DICYCLOMINE HCL 20 MG
20 TABLET ORAL ONCE
Status: COMPLETED | OUTPATIENT
Start: 2018-02-19 | End: 2018-02-19

## 2018-02-19 RX ORDER — POTASSIUM CHLORIDE 20 MEQ/1
20 TABLET, EXTENDED RELEASE ORAL 2 TIMES DAILY
Qty: 6 TABLET | Refills: 0 | Status: SHIPPED | OUTPATIENT
Start: 2018-02-19 | End: 2018-02-22

## 2018-02-19 RX ORDER — ONDANSETRON 2 MG/ML
4 INJECTION INTRAMUSCULAR; INTRAVENOUS ONCE
Status: COMPLETED | OUTPATIENT
Start: 2018-02-19 | End: 2018-02-19

## 2018-02-19 RX ORDER — ONDANSETRON 4 MG/1
4 TABLET, ORALLY DISINTEGRATING ORAL EVERY 8 HOURS PRN
Qty: 15 TABLET | Refills: 0 | Status: SHIPPED | OUTPATIENT
Start: 2018-02-19 | End: 2018-04-16

## 2018-02-19 RX ORDER — SODIUM CHLORIDE 0.9 % (FLUSH) 0.9 %
10 SYRINGE (ML) INJECTION AS NEEDED
Status: DISCONTINUED | OUTPATIENT
Start: 2018-02-19 | End: 2018-02-19 | Stop reason: HOSPADM

## 2018-02-19 RX ORDER — POTASSIUM CHLORIDE 20 MEQ/1
40 TABLET, EXTENDED RELEASE ORAL ONCE
Status: COMPLETED | OUTPATIENT
Start: 2018-02-19 | End: 2018-02-19

## 2018-02-19 RX ADMIN — ONDANSETRON 4 MG: 2 INJECTION, SOLUTION INTRAMUSCULAR; INTRAVENOUS at 18:57

## 2018-02-19 RX ADMIN — DICYCLOMINE HYDROCHLORIDE 20 MG: 20 TABLET ORAL at 21:04

## 2018-02-19 RX ADMIN — POTASSIUM CHLORIDE 40 MEQ: 1500 TABLET, EXTENDED RELEASE ORAL at 18:57

## 2018-02-19 RX ADMIN — IOPAMIDOL 100 ML: 612 INJECTION, SOLUTION INTRAVENOUS at 19:19

## 2018-02-19 RX ADMIN — MORPHINE SULFATE 2 MG: 2 INJECTION, SOLUTION INTRAMUSCULAR; INTRAVENOUS at 18:57

## 2018-02-19 RX ADMIN — SODIUM CHLORIDE 1000 ML: 9 INJECTION, SOLUTION INTRAVENOUS at 18:57

## 2018-02-20 NOTE — ED PROVIDER NOTES
Subjective   Patient is a 42 y.o. female presenting with abdominal pain.   History provided by:  Patient   used: No    Abdominal Pain   Pain location:  Generalized  Pain quality: cramping    Pain radiates to:  Does not radiate  Pain severity:  Moderate  Onset quality:  Sudden  Duration:  1 week  Timing:  Constant  Progression:  Unchanged  Chronicity:  New  Context: not sick contacts and not suspicious food intake    Relieved by:  None tried  Worsened by:  Palpation and vomiting  Ineffective treatments:  None tried  Associated symptoms: diarrhea and nausea    Associated symptoms: no chest pain, no cough, no dysuria, no fever, no shortness of breath, no sore throat and no vomiting    Risk factors: no NSAID use and not obese        Review of Systems   Constitutional: Negative for activity change and fever.   HENT: Negative for congestion, ear pain and sore throat.    Eyes: Negative for pain.   Respiratory: Negative for cough, shortness of breath and wheezing.    Cardiovascular: Negative for chest pain.   Gastrointestinal: Positive for abdominal pain, diarrhea and nausea. Negative for abdominal distention and vomiting.   Genitourinary: Negative for difficulty urinating and dysuria.   Musculoskeletal: Negative for arthralgias and myalgias.   Skin: Negative for rash and wound.   Neurological: Negative for dizziness and headaches.   Psychiatric/Behavioral: Negative for agitation.   All other systems reviewed and are negative.      Past Medical History:   Diagnosis Date   • Allergic rhinitis    • Dysuria    • Headache    • Jaundice    • Spherocytosis (familial)    • Spherocytosis, hereditary        No Known Allergies    Past Surgical History:   Procedure Laterality Date   • CHOLECYSTECTOMY     • TUBAL ABDOMINAL LIGATION         Family History   Problem Relation Age of Onset   • No Known Problems Mother    • Hypertension Father    • Heart disease Father    • No Known Problems Sister    • No Known Problems  Brother    • Bell's palsy Brother    • Breast cancer Neg Hx        Social History     Social History   • Marital status:      Spouse name: N/A   • Number of children: N/A   • Years of education: N/A     Social History Main Topics   • Smoking status: Never Smoker   • Smokeless tobacco: Never Used   • Alcohol use No   • Drug use: No   • Sexual activity: Defer     Other Topics Concern   • Not on file     Social History Narrative           Objective   Physical Exam   Constitutional: She is oriented to person, place, and time. She appears well-developed and well-nourished.   HENT:   Head: Normocephalic and atraumatic.   Eyes: EOM are normal. Pupils are equal, round, and reactive to light.   Neck: Normal range of motion. Neck supple.   Cardiovascular: Normal rate, regular rhythm and normal heart sounds.    Pulmonary/Chest: Effort normal and breath sounds normal.   Abdominal: Soft. Bowel sounds are normal. There is generalized tenderness.   Musculoskeletal: Normal range of motion.   Neurological: She is alert and oriented to person, place, and time.   Skin: Skin is warm and dry.   Psychiatric: She has a normal mood and affect. Her behavior is normal. Judgment and thought content normal.   Nursing note and vitals reviewed.      Procedures         ED Course  ED Course                  MDM  Number of Diagnoses or Management Options  Abdominal pain, vomiting, and diarrhea:      Amount and/or Complexity of Data Reviewed  Clinical lab tests: ordered and reviewed  Tests in the radiology section of CPT®: ordered and reviewed  Tests in the medicine section of CPT®: reviewed and ordered  Independent visualization of images, tracings, or specimens: yes    Patient Progress  Patient progress: stable      Final diagnoses:   Abdominal pain, vomiting, and diarrhea   Hypokalemia            LOC Stanley  02/19/18 2040       LOC Stanley  02/19/18 2104

## 2018-02-22 LAB — BACTERIA SPEC AEROBE CULT: NORMAL

## 2018-04-15 ENCOUNTER — PREP FOR SURGERY (OUTPATIENT)
Dept: OTHER | Facility: HOSPITAL | Age: 43
End: 2018-04-15

## 2018-04-15 DIAGNOSIS — N93.9 ABNORMAL UTERINE BLEEDING (AUB): ICD-10-CM

## 2018-04-15 DIAGNOSIS — D25.0 INTRAMURAL AND SUBMUCOUS LEIOMYOMA OF UTERUS: Primary | ICD-10-CM

## 2018-04-15 DIAGNOSIS — N94.6 DYSMENORRHEA: ICD-10-CM

## 2018-04-15 DIAGNOSIS — D25.1 INTRAMURAL AND SUBMUCOUS LEIOMYOMA OF UTERUS: Primary | ICD-10-CM

## 2018-04-15 RX ORDER — SODIUM CHLORIDE 0.9 % (FLUSH) 0.9 %
1-10 SYRINGE (ML) INJECTION AS NEEDED
Status: CANCELLED | OUTPATIENT
Start: 2018-04-19

## 2018-04-16 ENCOUNTER — APPOINTMENT (OUTPATIENT)
Dept: PREADMISSION TESTING | Facility: HOSPITAL | Age: 43
End: 2018-04-16

## 2018-04-16 DIAGNOSIS — D25.1 INTRAMURAL AND SUBMUCOUS LEIOMYOMA OF UTERUS: ICD-10-CM

## 2018-04-16 DIAGNOSIS — D25.0 INTRAMURAL AND SUBMUCOUS LEIOMYOMA OF UTERUS: ICD-10-CM

## 2018-04-16 DIAGNOSIS — N94.6 DYSMENORRHEA: ICD-10-CM

## 2018-04-16 DIAGNOSIS — N93.9 ABNORMAL UTERINE BLEEDING (AUB): ICD-10-CM

## 2018-04-16 PROCEDURE — 36415 COLL VENOUS BLD VENIPUNCTURE: CPT

## 2018-04-16 PROCEDURE — 86870 RBC ANTIBODY IDENTIFICATION: CPT | Performed by: PHYSICIAN ASSISTANT

## 2018-04-16 PROCEDURE — 86900 BLOOD TYPING SEROLOGIC ABO: CPT | Performed by: PHYSICIAN ASSISTANT

## 2018-04-16 PROCEDURE — 86902 BLOOD TYPE ANTIGEN DONOR EA: CPT

## 2018-04-16 PROCEDURE — 86905 BLOOD TYPING RBC ANTIGENS: CPT

## 2018-04-16 PROCEDURE — 86850 RBC ANTIBODY SCREEN: CPT | Performed by: PHYSICIAN ASSISTANT

## 2018-04-16 PROCEDURE — 86901 BLOOD TYPING SEROLOGIC RH(D): CPT | Performed by: PHYSICIAN ASSISTANT

## 2018-04-16 PROCEDURE — 85025 COMPLETE CBC W/AUTO DIFF WBC: CPT | Performed by: PHYSICIAN ASSISTANT

## 2018-04-16 PROCEDURE — 86920 COMPATIBILITY TEST SPIN: CPT

## 2018-04-16 PROCEDURE — 86922 COMPATIBILITY TEST ANTIGLOB: CPT

## 2018-04-16 RX ORDER — LANOLIN ALCOHOL/MO/W.PET/CERES
1000 CREAM (GRAM) TOPICAL DAILY
COMMUNITY
End: 2022-02-17

## 2018-04-16 RX ORDER — FOLIC ACID 1 MG/1
1 TABLET ORAL DAILY
COMMUNITY
End: 2020-01-06

## 2018-04-16 RX ORDER — FERROUS SULFATE 325(65) MG
325 TABLET ORAL
COMMUNITY
End: 2020-01-06

## 2018-04-16 NOTE — DISCHARGE INSTRUCTIONS
0630                      4/19/18                         Arrival time    TAKE the following medications the morning of surgery:  All heart or blood pressure medications    HOLD all diabetic medications the morning of surgery as ordered by physician.    Please discontinue all blood thinners and anticoagulants (except aspirin) prior to surgery as per your surgeon and cardiologist instructions.  Aspirin may be continued up to the day prior to surgery.     CHLORHEXIDINE CLOTHS GIVEN WITH INSTRUCTIONS AND FORM TO RETURN TO HOSPITAL    General Instructions:  · Do not eat or drink after midnight: includes water, mints, or gum. You may brush your teeth.  Dental appliances that are removable must be taken out day of surgery.  · Do not smoke, chew tobacco, or drink alcohol.  · Bring medications in original bottles, any inhalers and if applicable your C-PAP/BI-PAP machine.  · Bring any papers given to you in the doctor's office.  · Wear clean comfortable clothes and socks.  · Do not wear contact lenses or make-up. Bring a case for your glasses if applicable.  · Bring crutches or walker if applicable.  · Leave all other valuables and jewelry at home.    If you were given a blood bank ID arm band remember to bring it with you the day of surgery.    Preventing a Surgical Site Infection:  Shower the night before surgery (unless instructed other wise) using a fresh bar of anti-bacterial soap (such as Dial) and clean washcloth. Dry with a clean towel and dress in clean clothing.  For 2 to 3 days before surgery, avoid shaving with a razor near where you will have surgery because the razor can irritate skin and make it easier to develop an infection. Ask your surgeon if you will be receiving antibiotics prior to surgery.  Make sure you, your family, and all healthcare providers clear their hands with soap and water or an alcohol-based hand  before caring for you or your wound.  If at all possible, quit smoking as many days  before surgery as you can.    Day of surgery:  Upon arrival, a Pre-op nurse and Anesthesiologist will review your health history, obtain vital signs, and answer questions you may have. The only belongings needed at this time will be your home medications and if applicable your C-PAP/BI-PAP machine. If you are staying overnight your family can leave the rest of your belongings in the car and bring them to your room later. A Pre-op nurse will start an IV and you may receive medication in preparation for surgery, including something to help you relax. Your family will be able to see you in the Pre-op area. While you are in surgery your family should notify the waiting room  if they leave the waiting room area and provide a contact phone number.    Please be aware that surgery does come with discomfort. We want to make every effort to control your discomfort so please discuss any uncontrolled symptoms with your nurse. Your doctor will most likely have prescribed pain medications.    If you are going home after surgery you will receive individualized written care instructions before being discharged. A responsible adult must drive you to and from the hospital on the day of surgery and stay with you for 24 hours.    If you are staying overnight following surgery, you will be transported to your hospital room following the recovery period.  [facility] has all private rooms.    If you have any questions please call Pre-Admission Testing at [phone].  Deductibles and co-payments are collected on the day of service. Please be prepared to pay the required co-pay, deductible or deposit on the day of service as defined by your plan.

## 2018-04-17 LAB
ABO GROUP BLD: NORMAL
ANTI-E: NORMAL
BASOPHILS # BLD AUTO: 0.01 10*3/MM3 (ref 0–0.3)
BASOPHILS NFR BLD AUTO: 0.1 % (ref 0–2)
BLD GP AB SCN SERPL QL: POSITIVE
DEPRECATED RDW RBC AUTO: 58.1 FL (ref 37–54)
EOSINOPHIL # BLD AUTO: 0.18 10*3/MM3 (ref 0–0.7)
EOSINOPHIL NFR BLD AUTO: 2.7 % (ref 0–5)
ERYTHROCYTE [DISTWIDTH] IN BLOOD BY AUTOMATED COUNT: 17.1 % (ref 11.5–14.5)
HCT VFR BLD AUTO: 39.4 % (ref 37–47)
HGB BLD-MCNC: 13.7 G/DL (ref 12–16)
IMM GRANULOCYTES # BLD: 0.02 10*3/MM3 (ref 0–0.03)
IMM GRANULOCYTES NFR BLD: 0.3 % (ref 0–0.5)
LYMPHOCYTES # BLD AUTO: 1.51 10*3/MM3 (ref 1–3)
LYMPHOCYTES NFR BLD AUTO: 22.4 % (ref 21–51)
MCH RBC QN AUTO: 34.1 PG (ref 27–33)
MCHC RBC AUTO-ENTMCNC: 34.8 G/DL (ref 33–37)
MCV RBC AUTO: 98 FL (ref 80–94)
MONOCYTES # BLD AUTO: 0.4 10*3/MM3 (ref 0.1–0.9)
MONOCYTES NFR BLD AUTO: 5.9 % (ref 0–10)
NEUTROPHILS # BLD AUTO: 4.61 10*3/MM3 (ref 1.4–6.5)
NEUTROPHILS NFR BLD AUTO: 68.6 % (ref 30–70)
PLATELET # BLD AUTO: 212 10*3/MM3 (ref 130–400)
PMV BLD AUTO: 11.5 FL (ref 6–10)
RBC # BLD AUTO: 4.02 10*6/MM3 (ref 4.2–5.4)
RH BLD: POSITIVE
T&S EXPIRATION DATE: NORMAL
WBC NRBC COR # BLD: 6.73 10*3/MM3 (ref 4.5–12.5)

## 2018-04-17 PROCEDURE — 86905 BLOOD TYPING RBC ANTIGENS: CPT

## 2018-04-17 PROCEDURE — 86902 BLOOD TYPE ANTIGEN DONOR EA: CPT

## 2018-04-18 ENCOUNTER — ANESTHESIA EVENT (OUTPATIENT)
Dept: PERIOP | Facility: HOSPITAL | Age: 43
End: 2018-04-18

## 2018-04-18 NOTE — H&P
PATIENT:  Betty Powers  YOB: 1975  DATE:   2018 3:00 PM   VISIT TYPE: Pre-Operative Visit        This 42 year old female presents with pelvic pain, dysmenorrhea, uterine leiomyoma      History of Present Illness:     42 yoa   x 3 who presented to the office with recurrent concerns of lower pelvic pain right side > left, worse on her menstrual cycle and concerns of abnormal menstruation. Past medical history consistent with ovarian cysts.  Pap exam performed 2018 wnl.  US performed at [facility] revealed enlarged uterus 8.70x 5.11x 5.49cm simple cyst noted bilaterally and multiple leiomyoma noted along anterior and posterior aspects of the uterus.  Options were discussed and it was decided to proceed with a Total Robotic Hysterectomy with Possible Bilateral Salpingectomy with Dr. Laurent.  Pt presents for pre-op today.  She has been educated on R/B and complications of procedure.  All questions answered.   Pt educated on ovarian preservation if ovaries wnl.  Discussed may not be ideal candidate for HRT with her personal history of hereditary spherocytosis + spenomegaly and history of jaundice currently on ASA 81 mg pt seeing hematologist at [facility] [place], hx of blood clot in her toe in the past.              Screening Tools  Other Screenings:  Date Instrument Score Severity/Interpretation MDD Classification   2018 Patient Health Questionnaire (PHQ-2) 0         Patient Status   Completed with information received for patient transitioning into care.     Medication Reconciliation  Medications reconciled today.  Medication Reviewed  Adherence Medication Name Sig Desc Elsewhere Status   taking as directed folic acid 1 mg tablet take 1 tablet by oral route 4 times every day N Verified   taking as directed Vitamin B-12 1,000 mcg tablet take one tablet daily N Verified   taking as directed Aspir-81 81 mg tablet,delayed release take 1 tablet by oral route  every day N Verified   taking as directed  iron 325 mg (65 mg iron) tablet take 1 tablet by oral route 2 times every day N Verified   taking as directed Diflucan 150 mg tablet take 1 tablet by oral route and repeat in 48 hours N Verified     Allergies:  Ingredient Reaction Medication Name Comment   NO KNOWN ALLERGIES          Past Medical/Surgical Hx:  Hereditary spherocytosis        Social History:  (Reviewed, updated)  Tobacco use reviewed.  Preferred language is English.    MARITAL STATUS/FAMILY/SOCIAL SUPPORT  Currently .    Smoking status: Never smoker.    SMOKING STATUS  Use Status Type Smoking Status Usage Per Day Years Used Total Pack Years   no/never  Never smoker          Orthodox/SPIRITUAL  The patient has [employer] affiliation.        Review of Systems  System Neg/Pos Details   Constitutional Negative Chills and fever.   Respiratory Negative Cough and dyspnea.   Cardio Negative Chest pain and irregular heartbeat/palpitations.   GI Positive Abdominal pain.   GI Negative Change in stool pattern.    Negative Dysuria and hematuria.   Psych Negative Feeling down, depressed or hopeless and little interest or pleasure in doing things.   MS Negative Back pain.   Reproductive Positive Dysmenorrhea, Irregular menses.   Reproductive Negative History of abnormal PAP smear and vaginal discharge.       Vital Signs     Time BP mm/Hg Pulse /min Resp /min Temp F Ht ft Ht in Ht cm Wt lb Wt kg BMI kg/m2 BSA m2 O2 Sat%   2:32 /77 61 18 97.8 5 6 167.64 159.8 72.484 25.79  98     Measured By  Time Measured by   2:32 PM Ruth Laughlin   Screening Summary:  The following were reviewed: tobacco use and date of last pap    Physical Exam  Exam Findings Details   Constitutional * Overall appearance - WNWD NAD.   Respiratory Normal Inspection - Normal. Auscultation - Normal. Effort - Normal.   Cardiovascular Normal Regular rhythm.  No murmurs, gallops, or rubs.   Abdomen * Abdominal tenderness.   Abdomen Normal Anterior palpation -  No rebound.  No hepatic enlargement. No hernia.   Genitourinary * Pelvic deferred. Rectal deferred.   Skin Normal Inspection - Normal.   Extremity Normal No edema.   Psychiatric Normal Oriented to time, place, person and situation. Appropriate mood and affect.       Completed Orders (this encounter)  Order Details Reason Side Interpretation Result Initial Treatment Date Region   Patient Health Questionnaire (PHQ-2)     0       Assessment/Plan  # Detail Type Description    1. Assessment Pre-op exam (Z01.818).    Impression Pt scheduled for Total Robotic Hysterectomy with Possible Bilateral Salpingectomy with Dr. Laurent.  To be at [facility] at 6:30 day of the procedure.  Pt educated on R/B and complications of procedure.  All questions answered..    Patient Plan Pt to be at the hospital at 6:30 day of procedure.  Pt educated to not eat or drink after midnight the day before surgery which includes water,mint or gum. You may brush your teeth.  Do NOT smoke, chew tobacco, or drink alcohol within 24 hours prior to surgery.  Wear clean, comfortable clothes and socks. No NOt wear contact lenses or make-up or dark nail polish.  Bring a case for your glasses if applicable.  Please keep your (red band - blood bank armband) and continue to wear until discharged after surgery.  Call if Questions Regarding Surgery         2. Assessment Uterine leiomyoma, unspecified location (D25.9).         3. 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         4. Assessment Body mass index (BMI) 25.0-25.9, adult (Z68.25).            The patient was checked out at 4:37 PM by Ruth Jasmine.  Electronically signed by:     Ruel Parsons  04/18/2018 12:47 PM   Document generated by:  Ruel Parsons 04/18/2018 12:47 PM

## 2018-04-19 ENCOUNTER — HOSPITAL ENCOUNTER (OUTPATIENT)
Facility: HOSPITAL | Age: 43
Discharge: HOME | End: 2018-04-23
Attending: OBSTETRICS & GYNECOLOGY | Admitting: OBSTETRICS & GYNECOLOGY

## 2018-04-19 ENCOUNTER — ANESTHESIA (OUTPATIENT)
Dept: PERIOP | Facility: HOSPITAL | Age: 43
End: 2018-04-19

## 2018-04-19 DIAGNOSIS — N93.9 ABNORMAL UTERINE BLEEDING (AUB): ICD-10-CM

## 2018-04-19 LAB
B-HCG UR QL: NEGATIVE
INTERNAL NEGATIVE CONTROL: NEGATIVE
INTERNAL POSITIVE CONTROL: POSITIVE
Lab: NORMAL

## 2018-04-19 PROCEDURE — 25010000002 FENTANYL CITRATE (PF) 100 MCG/2ML SOLUTION: Performed by: NURSE ANESTHETIST, CERTIFIED REGISTERED

## 2018-04-19 PROCEDURE — 25010000002 MIDAZOLAM PER 1 MG: Performed by: NURSE ANESTHETIST, CERTIFIED REGISTERED

## 2018-04-19 PROCEDURE — 25010000002 PROMETHAZINE PER 50 MG: Performed by: OBSTETRICS & GYNECOLOGY

## 2018-04-19 PROCEDURE — 25010000002 PROPOFOL 10 MG/ML EMULSION: Performed by: NURSE ANESTHETIST, CERTIFIED REGISTERED

## 2018-04-19 PROCEDURE — 25010000002 METOCLOPRAMIDE PER 10 MG: Performed by: OBSTETRICS & GYNECOLOGY

## 2018-04-19 PROCEDURE — 25010000002 HYDROMORPHONE PER 4 MG: Performed by: OBSTETRICS & GYNECOLOGY

## 2018-04-19 PROCEDURE — 25010000002 KETOROLAC TROMETHAMINE PER 15 MG: Performed by: OBSTETRICS & GYNECOLOGY

## 2018-04-19 PROCEDURE — 25010000002 ONDANSETRON PER 1 MG: Performed by: OBSTETRICS & GYNECOLOGY

## 2018-04-19 PROCEDURE — G0378 HOSPITAL OBSERVATION PER HR: HCPCS

## 2018-04-19 PROCEDURE — 25010000002 DEXAMETHASONE PER 1 MG: Performed by: NURSE ANESTHETIST, CERTIFIED REGISTERED

## 2018-04-19 PROCEDURE — 25010000002 ONDANSETRON PER 1 MG: Performed by: NURSE ANESTHETIST, CERTIFIED REGISTERED

## 2018-04-19 PROCEDURE — 88307 TISSUE EXAM BY PATHOLOGIST: CPT | Performed by: OBSTETRICS & GYNECOLOGY

## 2018-04-19 PROCEDURE — 25010000002 CEFOXITIN: Performed by: PHYSICIAN ASSISTANT

## 2018-04-19 PROCEDURE — 25010000002 ENOXAPARIN PER 10 MG: Performed by: OBSTETRICS & GYNECOLOGY

## 2018-04-19 PROCEDURE — 25010000002 NEOSTIGMINE 10 MG/10ML SOLUTION: Performed by: NURSE ANESTHETIST, CERTIFIED REGISTERED

## 2018-04-19 RX ORDER — ASPIRIN 81 MG/1
81 TABLET ORAL DAILY
Status: DISCONTINUED | OUTPATIENT
Start: 2018-04-19 | End: 2018-04-23 | Stop reason: HOSPADM

## 2018-04-19 RX ORDER — BUPIVACAINE HYDROCHLORIDE AND EPINEPHRINE 5; 5 MG/ML; UG/ML
INJECTION, SOLUTION EPIDURAL; INTRACAUDAL; PERINEURAL AS NEEDED
Status: DISCONTINUED | OUTPATIENT
Start: 2018-04-19 | End: 2018-04-19 | Stop reason: HOSPADM

## 2018-04-19 RX ORDER — BUPIVACAINE HYDROCHLORIDE 5 MG/ML
INJECTION, SOLUTION EPIDURAL; INTRACAUDAL AS NEEDED
Status: DISCONTINUED | OUTPATIENT
Start: 2018-04-19 | End: 2018-04-19 | Stop reason: HOSPADM

## 2018-04-19 RX ORDER — SODIUM CHLORIDE, SODIUM LACTATE, POTASSIUM CHLORIDE, CALCIUM CHLORIDE 600; 310; 30; 20 MG/100ML; MG/100ML; MG/100ML; MG/100ML
125 INJECTION, SOLUTION INTRAVENOUS CONTINUOUS
Status: DISCONTINUED | OUTPATIENT
Start: 2018-04-19 | End: 2018-04-23 | Stop reason: HOSPADM

## 2018-04-19 RX ORDER — SCOLOPAMINE TRANSDERMAL SYSTEM 1 MG/1
1 PATCH, EXTENDED RELEASE TRANSDERMAL ONCE
Status: COMPLETED | OUTPATIENT
Start: 2018-04-19 | End: 2018-04-22

## 2018-04-19 RX ORDER — ONDANSETRON 4 MG/1
4 TABLET, ORALLY DISINTEGRATING ORAL EVERY 6 HOURS PRN
Status: DISCONTINUED | OUTPATIENT
Start: 2018-04-19 | End: 2018-04-23 | Stop reason: HOSPADM

## 2018-04-19 RX ORDER — MIDAZOLAM HYDROCHLORIDE 1 MG/ML
INJECTION INTRAMUSCULAR; INTRAVENOUS AS NEEDED
Status: DISCONTINUED | OUTPATIENT
Start: 2018-04-19 | End: 2018-04-19 | Stop reason: SURG

## 2018-04-19 RX ORDER — OXYCODONE HYDROCHLORIDE AND ACETAMINOPHEN 5; 325 MG/1; MG/1
1 TABLET ORAL ONCE AS NEEDED
Status: DISCONTINUED | OUTPATIENT
Start: 2018-04-19 | End: 2018-04-19 | Stop reason: HOSPADM

## 2018-04-19 RX ORDER — IPRATROPIUM BROMIDE AND ALBUTEROL SULFATE 2.5; .5 MG/3ML; MG/3ML
3 SOLUTION RESPIRATORY (INHALATION) ONCE AS NEEDED
Status: DISCONTINUED | OUTPATIENT
Start: 2018-04-19 | End: 2018-04-19 | Stop reason: HOSPADM

## 2018-04-19 RX ORDER — MAGNESIUM HYDROXIDE 1200 MG/15ML
LIQUID ORAL AS NEEDED
Status: DISCONTINUED | OUTPATIENT
Start: 2018-04-19 | End: 2018-04-19 | Stop reason: HOSPADM

## 2018-04-19 RX ORDER — KETOROLAC TROMETHAMINE 30 MG/ML
30 INJECTION, SOLUTION INTRAMUSCULAR; INTRAVENOUS EVERY 6 HOURS PRN
Status: DISPENSED | OUTPATIENT
Start: 2018-04-19 | End: 2018-04-20

## 2018-04-19 RX ORDER — ONDANSETRON 2 MG/ML
INJECTION INTRAMUSCULAR; INTRAVENOUS AS NEEDED
Status: DISCONTINUED | OUTPATIENT
Start: 2018-04-19 | End: 2018-04-19 | Stop reason: SURG

## 2018-04-19 RX ORDER — IBUPROFEN 800 MG/1
800 TABLET ORAL 3 TIMES DAILY
Status: DISCONTINUED | OUTPATIENT
Start: 2018-04-19 | End: 2018-04-23 | Stop reason: HOSPADM

## 2018-04-19 RX ORDER — NALOXONE HCL 0.4 MG/ML
0.1 VIAL (ML) INJECTION
Status: DISCONTINUED | OUTPATIENT
Start: 2018-04-19 | End: 2018-04-23 | Stop reason: HOSPADM

## 2018-04-19 RX ORDER — FOLIC ACID 1 MG/1
1 TABLET ORAL DAILY
Status: DISCONTINUED | OUTPATIENT
Start: 2018-04-19 | End: 2018-04-23 | Stop reason: HOSPADM

## 2018-04-19 RX ORDER — OXYCODONE AND ACETAMINOPHEN 10; 325 MG/1; MG/1
1 TABLET ORAL EVERY 4 HOURS PRN
Status: DISCONTINUED | OUTPATIENT
Start: 2018-04-19 | End: 2018-04-23

## 2018-04-19 RX ORDER — ONDANSETRON 4 MG/1
4 TABLET, FILM COATED ORAL EVERY 6 HOURS PRN
Status: DISCONTINUED | OUTPATIENT
Start: 2018-04-19 | End: 2018-04-23 | Stop reason: HOSPADM

## 2018-04-19 RX ORDER — PROPOFOL 10 MG/ML
VIAL (ML) INTRAVENOUS AS NEEDED
Status: DISCONTINUED | OUTPATIENT
Start: 2018-04-19 | End: 2018-04-19 | Stop reason: SURG

## 2018-04-19 RX ORDER — LANOLIN ALCOHOL/MO/W.PET/CERES
1000 CREAM (GRAM) TOPICAL DAILY
Status: DISCONTINUED | OUTPATIENT
Start: 2018-04-19 | End: 2018-04-23 | Stop reason: HOSPADM

## 2018-04-19 RX ORDER — OXYCODONE HYDROCHLORIDE AND ACETAMINOPHEN 5; 325 MG/1; MG/1
1 TABLET ORAL EVERY 4 HOURS PRN
Status: DISCONTINUED | OUTPATIENT
Start: 2018-04-19 | End: 2018-04-23

## 2018-04-19 RX ORDER — ZOLPIDEM TARTRATE 5 MG/1
5 TABLET ORAL NIGHTLY PRN
Status: DISCONTINUED | OUTPATIENT
Start: 2018-04-19 | End: 2018-04-23 | Stop reason: HOSPADM

## 2018-04-19 RX ORDER — MEPERIDINE HYDROCHLORIDE 50 MG/ML
12.5 INJECTION INTRAMUSCULAR; INTRAVENOUS; SUBCUTANEOUS
Status: DISCONTINUED | OUTPATIENT
Start: 2018-04-19 | End: 2018-04-19 | Stop reason: HOSPADM

## 2018-04-19 RX ORDER — SODIUM CHLORIDE, SODIUM LACTATE, POTASSIUM CHLORIDE, CALCIUM CHLORIDE 600; 310; 30; 20 MG/100ML; MG/100ML; MG/100ML; MG/100ML
125 INJECTION, SOLUTION INTRAVENOUS CONTINUOUS
Status: DISCONTINUED | OUTPATIENT
Start: 2018-04-19 | End: 2018-04-19 | Stop reason: HOSPADM

## 2018-04-19 RX ORDER — HYDROMORPHONE HYDROCHLORIDE 1 MG/ML
0.5 INJECTION, SOLUTION INTRAMUSCULAR; INTRAVENOUS; SUBCUTANEOUS
Status: DISCONTINUED | OUTPATIENT
Start: 2018-04-19 | End: 2018-04-23 | Stop reason: HOSPADM

## 2018-04-19 RX ORDER — ROCURONIUM BROMIDE 10 MG/ML
INJECTION, SOLUTION INTRAVENOUS AS NEEDED
Status: DISCONTINUED | OUTPATIENT
Start: 2018-04-19 | End: 2018-04-19 | Stop reason: SURG

## 2018-04-19 RX ORDER — SODIUM CHLORIDE 0.9 % (FLUSH) 0.9 %
1-10 SYRINGE (ML) INJECTION AS NEEDED
Status: DISCONTINUED | OUTPATIENT
Start: 2018-04-19 | End: 2018-04-19 | Stop reason: HOSPADM

## 2018-04-19 RX ORDER — PROMETHAZINE HYDROCHLORIDE 25 MG/ML
12.5 INJECTION, SOLUTION INTRAMUSCULAR; INTRAVENOUS EVERY 6 HOURS PRN
Status: DISCONTINUED | OUTPATIENT
Start: 2018-04-19 | End: 2018-04-19 | Stop reason: SDUPTHER

## 2018-04-19 RX ORDER — LIDOCAINE HYDROCHLORIDE 20 MG/ML
INJECTION, SOLUTION INFILTRATION; PERINEURAL AS NEEDED
Status: DISCONTINUED | OUTPATIENT
Start: 2018-04-19 | End: 2018-04-19 | Stop reason: SURG

## 2018-04-19 RX ORDER — NEOSTIGMINE METHYLSULFATE 1 MG/ML
INJECTION, SOLUTION INTRAVENOUS AS NEEDED
Status: DISCONTINUED | OUTPATIENT
Start: 2018-04-19 | End: 2018-04-19 | Stop reason: SURG

## 2018-04-19 RX ORDER — DEXAMETHASONE SODIUM PHOSPHATE 10 MG/ML
INJECTION INTRAMUSCULAR; INTRAVENOUS AS NEEDED
Status: DISCONTINUED | OUTPATIENT
Start: 2018-04-19 | End: 2018-04-19 | Stop reason: SURG

## 2018-04-19 RX ORDER — FENTANYL CITRATE 50 UG/ML
INJECTION, SOLUTION INTRAMUSCULAR; INTRAVENOUS AS NEEDED
Status: DISCONTINUED | OUTPATIENT
Start: 2018-04-19 | End: 2018-04-19 | Stop reason: SURG

## 2018-04-19 RX ORDER — ONDANSETRON 2 MG/ML
4 INJECTION INTRAMUSCULAR; INTRAVENOUS ONCE AS NEEDED
Status: DISCONTINUED | OUTPATIENT
Start: 2018-04-19 | End: 2018-04-19 | Stop reason: HOSPADM

## 2018-04-19 RX ORDER — SODIUM CHLORIDE 9 MG/ML
INJECTION, SOLUTION INTRAVENOUS AS NEEDED
Status: DISCONTINUED | OUTPATIENT
Start: 2018-04-19 | End: 2018-04-19 | Stop reason: HOSPADM

## 2018-04-19 RX ORDER — ONDANSETRON 2 MG/ML
4 INJECTION INTRAMUSCULAR; INTRAVENOUS EVERY 6 HOURS PRN
Status: DISCONTINUED | OUTPATIENT
Start: 2018-04-19 | End: 2018-04-23 | Stop reason: HOSPADM

## 2018-04-19 RX ORDER — GLYCOPYRROLATE 0.2 MG/ML
INJECTION INTRAMUSCULAR; INTRAVENOUS AS NEEDED
Status: DISCONTINUED | OUTPATIENT
Start: 2018-04-19 | End: 2018-04-19 | Stop reason: SURG

## 2018-04-19 RX ORDER — FENTANYL CITRATE 50 UG/ML
50 INJECTION, SOLUTION INTRAMUSCULAR; INTRAVENOUS
Status: DISCONTINUED | OUTPATIENT
Start: 2018-04-19 | End: 2018-04-19 | Stop reason: HOSPADM

## 2018-04-19 RX ORDER — METOCLOPRAMIDE HYDROCHLORIDE 5 MG/ML
10 INJECTION INTRAMUSCULAR; INTRAVENOUS EVERY 6 HOURS PRN
Status: DISCONTINUED | OUTPATIENT
Start: 2018-04-19 | End: 2018-04-23 | Stop reason: HOSPADM

## 2018-04-19 RX ORDER — FERROUS SULFATE 325(65) MG
325 TABLET ORAL
Status: DISCONTINUED | OUTPATIENT
Start: 2018-04-19 | End: 2018-04-23 | Stop reason: HOSPADM

## 2018-04-19 RX ADMIN — NEOSTIGMINE METHYLSULFATE 3 MG: 1 INJECTION, SOLUTION INTRAVENOUS at 08:22

## 2018-04-19 RX ADMIN — SODIUM CHLORIDE, POTASSIUM CHLORIDE, SODIUM LACTATE AND CALCIUM CHLORIDE 125 ML/HR: 600; 310; 30; 20 INJECTION, SOLUTION INTRAVENOUS at 09:40

## 2018-04-19 RX ADMIN — KETOROLAC TROMETHAMINE 30 MG: 30 INJECTION, SOLUTION INTRAMUSCULAR; INTRAVENOUS at 09:40

## 2018-04-19 RX ADMIN — ONDANSETRON 4 MG: 4 TABLET, FILM COATED ORAL at 15:58

## 2018-04-19 RX ADMIN — HYDROMORPHONE HYDROCHLORIDE 0.5 MG: 1 INJECTION, SOLUTION INTRAMUSCULAR; INTRAVENOUS; SUBCUTANEOUS at 10:16

## 2018-04-19 RX ADMIN — ONDANSETRON 4 MG: 2 INJECTION INTRAMUSCULAR; INTRAVENOUS at 16:12

## 2018-04-19 RX ADMIN — SCOPALAMINE 1 PATCH: 1 PATCH, EXTENDED RELEASE TRANSDERMAL at 07:15

## 2018-04-19 RX ADMIN — ONDANSETRON 4 MG: 2 INJECTION, SOLUTION INTRAMUSCULAR; INTRAVENOUS at 07:33

## 2018-04-19 RX ADMIN — HYDROMORPHONE HYDROCHLORIDE 0.5 MG: 1 INJECTION, SOLUTION INTRAMUSCULAR; INTRAVENOUS; SUBCUTANEOUS at 16:02

## 2018-04-19 RX ADMIN — FENTANYL CITRATE 50 MCG: 50 INJECTION, SOLUTION INTRAMUSCULAR; INTRAVENOUS at 08:30

## 2018-04-19 RX ADMIN — FENTANYL CITRATE 50 MCG: 50 INJECTION, SOLUTION INTRAMUSCULAR; INTRAVENOUS at 07:33

## 2018-04-19 RX ADMIN — CEFOXITIN 2 G: 2 INJECTION, POWDER, FOR SOLUTION INTRAVENOUS at 07:29

## 2018-04-19 RX ADMIN — ENOXAPARIN SODIUM 40 MG: 40 INJECTION SUBCUTANEOUS at 17:06

## 2018-04-19 RX ADMIN — LIDOCAINE HYDROCHLORIDE 60 MG: 20 INJECTION, SOLUTION INFILTRATION; PERINEURAL at 07:33

## 2018-04-19 RX ADMIN — SODIUM CHLORIDE, POTASSIUM CHLORIDE, SODIUM LACTATE AND CALCIUM CHLORIDE: 600; 310; 30; 20 INJECTION, SOLUTION INTRAVENOUS at 08:15

## 2018-04-19 RX ADMIN — MIDAZOLAM HYDROCHLORIDE 2 MG: 1 INJECTION, SOLUTION INTRAMUSCULAR; INTRAVENOUS at 07:29

## 2018-04-19 RX ADMIN — DEXAMETHASONE SODIUM PHOSPHATE 4 MG: 10 INJECTION INTRAMUSCULAR; INTRAVENOUS at 07:33

## 2018-04-19 RX ADMIN — SODIUM CHLORIDE, POTASSIUM CHLORIDE, SODIUM LACTATE AND CALCIUM CHLORIDE 125 ML/HR: 600; 310; 30; 20 INJECTION, SOLUTION INTRAVENOUS at 17:30

## 2018-04-19 RX ADMIN — HYDROMORPHONE HYDROCHLORIDE 0.5 MG: 1 INJECTION, SOLUTION INTRAMUSCULAR; INTRAVENOUS; SUBCUTANEOUS at 12:30

## 2018-04-19 RX ADMIN — GLYCOPYRROLATE 0.4 MG: 0.2 INJECTION, SOLUTION INTRAMUSCULAR; INTRAVENOUS at 08:22

## 2018-04-19 RX ADMIN — ONDANSETRON 4 MG: 2 INJECTION, SOLUTION INTRAMUSCULAR; INTRAVENOUS at 09:08

## 2018-04-19 RX ADMIN — SODIUM CHLORIDE, POTASSIUM CHLORIDE, SODIUM LACTATE AND CALCIUM CHLORIDE 125 ML/HR: 600; 310; 30; 20 INJECTION, SOLUTION INTRAVENOUS at 07:15

## 2018-04-19 RX ADMIN — METOCLOPRAMIDE 10 MG: 5 INJECTION, SOLUTION INTRAMUSCULAR; INTRAVENOUS at 09:40

## 2018-04-19 RX ADMIN — PROMETHAZINE HYDROCHLORIDE 12.5 MG: 25 INJECTION INTRAMUSCULAR; INTRAVENOUS at 19:55

## 2018-04-19 RX ADMIN — KETOROLAC TROMETHAMINE 30 MG: 30 INJECTION, SOLUTION INTRAMUSCULAR; INTRAVENOUS at 23:10

## 2018-04-19 RX ADMIN — FENTANYL CITRATE 100 MCG: 50 INJECTION, SOLUTION INTRAMUSCULAR; INTRAVENOUS at 07:29

## 2018-04-19 RX ADMIN — FENTANYL CITRATE 50 MCG: 50 INJECTION, SOLUTION INTRAMUSCULAR; INTRAVENOUS at 08:25

## 2018-04-19 RX ADMIN — ROCURONIUM BROMIDE 30 MG: 10 INJECTION INTRAVENOUS at 07:33

## 2018-04-19 RX ADMIN — PROPOFOL 150 MG: 10 INJECTION, EMULSION INTRAVENOUS at 07:33

## 2018-04-19 NOTE — ANESTHESIA PROCEDURE NOTES
Airway  Urgency: elective    Date/Time: 4/19/2018 7:34 AM  Airway not difficult    General Information and Staff    Patient location during procedure: OR  Anesthesiologist: KARTHIK PADILLA  CRNA: LEROY VERA    Indications and Patient Condition  Indications for airway management: airway protection    Preoxygenated: yes  MILS maintained throughout  Mask difficulty assessment: 0 - not attempted    Final Airway Details  Final airway type: endotracheal airway      Successful airway: ETT  Cuffed: yes   Successful intubation technique: direct laryngoscopy  Facilitating devices/methods: intubating stylet  Endotracheal tube insertion site: oral  Blade: Dahiana  Blade size: #3  ETT size: 7.0 mm  Cormack-Lehane Classification: grade I - full view of glottis  Placement verified by: chest auscultation, capnometry and palpation of cuff   Cuff volume (mL): 10  Measured from: lips  ETT to lips (cm): 21  Number of attempts at approach: 1    Additional Comments  Dentition and lips same as preop

## 2018-04-19 NOTE — ANESTHESIA POSTPROCEDURE EVALUATION
Patient: Betty Powers    Procedure Summary     Date:  04/19/18 Room / Location:  [facility] OR 04 /  [facility] OR    Anesthesia Start:  0729 Anesthesia Stop:  0834    Procedure:  TOTAL LAPAROSCOPIC HYSTERECTOMY BILATERAL SALPINGO OOPHERECTOMY WITH DAVINCI SI ROBOT (N/A Abdomen) Diagnosis:  (D25.9)    Surgeon:  Kevin Laurent DO Provider:  Jacek Ortiz MD    Anesthesia Type:  general ASA Status:  3          Anesthesia Type: general  Last vitals  BP   128/78 (04/19/18 0910)   Temp   97.2 °F (36.2 °C) (04/19/18 0905)   Pulse   78 (04/19/18 0910)   Resp   16 (04/19/18 0910)     SpO2   98 % (04/19/18 0910)     Post Anesthesia Care and Evaluation    Patient location during evaluation: PHASE II  Patient participation: complete - patient participated  Level of consciousness: awake and alert  Pain score: 1  Pain management: adequate  Airway patency: patent  Anesthetic complications: No anesthetic complications  PONV Status: controlled  Cardiovascular status: acceptable  Respiratory status: acceptable  Hydration status: acceptable

## 2018-04-19 NOTE — ANESTHESIA PREPROCEDURE EVALUATION
Anesthesia Evaluation     Patient summary reviewed and Nursing notes reviewed   history of anesthetic complications: PONV  NPO Solid Status: > 8 hours  NPO Liquid Status: > 8 hours           Airway   Mallampati: II  TM distance: >3 FB  Neck ROM: full  no difficulty expected  Dental    (+) poor dentition, upper dentures and lower dentures    Pulmonary - negative pulmonary ROS   (+) decreased breath sounds,   Cardiovascular - negative cardio ROS    Rhythm: regular  Rate: normal        Neuro/Psych  (+) headaches,     GI/Hepatic/Renal/Endo - negative ROS     Musculoskeletal     (+) chronic pain,   Abdominal    Substance History - negative use     OB/GYN negative ob/gyn ROS         Other - negative ROS       ROS/Med Hx Other: History of blood clots                  Anesthesia Plan    ASA 3     general     intravenous induction   Anesthetic plan and risks discussed with patient.  Use of blood products discussed with patient .   Plan discussed with CRNA.

## 2018-04-19 NOTE — OP NOTE
TOTAL LAPAROSCOPIC HYSTERECTOMY WITH DAVINCI SI ROBOT  Procedure Note    Betty Powers  4/19/2018    Pre-op Diagnosis:   Fibroid uterus  Pelvic pain    Post-op Diagnosis:     Same plus left ovarian cyst    Procedure(s):  TOTAL LAPAROSCOPIC HYSTERECTOMY BILATERAL SALPINGECTOMY AND LEFT OOPHORECTOMY WITH DAVINCI SI ROBOT    Surgeon(s):  Kevin Laurent DO    Anesthesia: General        Estimated Blood Loss: minimal    Specimens:                  Order Name Source Comment Collection Info Order Time   PREGNANCY, URINE    4/19/2018  7:07 AM   TISSUE PATHOLOGY EXAM Uterus, Cervix, Bilateral Fallopian Tubes   Collected By: Kevin Laurent DO 4/19/2018  8:19 AM         Procedure:  The patient was taken to the operating room after informed written consent was obtained. General anesthesia was induced and the patient was placed in the dorsal lithotomy position. The patient was sterily prepared and draped and a Haas catheter was placed into the bladder. Next the ELEAZAR manipulator was placed into the uterus with the KOH cup fitting snugly around the cervix. Once this was in place the surgeon was regloved and attention was made to the abdomen.     We made a 1 cm incision above the umbilicus. We inserted an OPTi view trocar into the abdomen under direct visualization using the laparoscope. Next we placed 8mm robotic trochars in the left lower quadrant and another one in the right lower quadrant we placed another trocar between the right-sided port and the midline port. All trochars used were 8mm robotic trochars. We then placed the patient in Trendelenburg and docked the robot. The remainder of the procedure was done under robotic guidance.    First we picked up the left fallopian tube and cauterized under it using the PK forceps and cutting using the scissors.  We later cauterized under the left infundibulopelvic ligament removing the left ovary as there was a hemorrhagic appearing ovarian cyst on left ovary.  We took  the same dissection across the broad ligament and then when across the utero ovarian ligament and the round ligament. We did all the cauterizing using the PK forceps and cutting using monopolar scissors. We then took the dissection to the point where the broad ligament divided anteriorly and posteriorly. Anteriorly we took the dissection across to make a bladder flap. We took it posteriorly as well skeletonizing the uterine artery. We then doubly cauterized and cut the uterine artery. We then took the bladder down using sharp dissection and the monopolar scissors. We then took serial bites on each side of the uterus taking the cardinal and the uterosacral ligaments. Once we got all the attachments of the uterus down we made a colpotomy anteriorly and carried it all away around. We then delivered the specimen into the vagina and left it there to keep the pneumoperitoneum. Next we made sure everything was hemostatic. We closed the vagina using a 2-0 monocryl V lock suture making sure to get good bites of the vaginal cuff angles and attaching them to the uterosacral ligaments bilaterally. We then ran the rest of the vaginal cuff taking 1 cm bots of vagina. We then re-approximated the peritoneum over the vaginal cuff using the same suture. We irrigated once again and made sure everything was hemostatic. We them placed some Marcaine into the pelvis for postoperative pain control. We then surveyed the remainder of the abdomen and pelvis and it was grossly normal. We removed the CO2 gas and closed the skin incision with a 4-0 monocryl and placed surgical adhesive on the skin.    Findings: Hemorrhagic left ovarian cyst    Complications: none  Grafts or Implants: NA    Kevin Laurent DO     Date: 4/19/2018  Time: 8:32 AM

## 2018-04-20 LAB
ABO + RH BLD: NORMAL
ABO + RH BLD: NORMAL
BH BB BLOOD EXPIRATION DATE: NORMAL
BH BB BLOOD EXPIRATION DATE: NORMAL
BH BB BLOOD TYPE BARCODE: 6200
BH BB BLOOD TYPE BARCODE: 6200
BH BB DISPENSE STATUS: NORMAL
BH BB DISPENSE STATUS: NORMAL
BH BB PRODUCT CODE: NORMAL
BH BB PRODUCT CODE: NORMAL
BH BB UNIT NUMBER: NORMAL
BH BB UNIT NUMBER: NORMAL
CROSSMATCH INTERPRETATION: NORMAL
CROSSMATCH INTERPRETATION: NORMAL
DEPRECATED RDW RBC AUTO: 51.6 FL (ref 37–54)
ERYTHROCYTE [DISTWIDTH] IN BLOOD BY AUTOMATED COUNT: 16 % (ref 11.5–14.5)
HCT VFR BLD AUTO: 31.8 % (ref 37–47)
HGB BLD-MCNC: 11.3 G/DL (ref 12–16)
MCH RBC QN AUTO: 32.6 PG (ref 27–33)
MCHC RBC AUTO-ENTMCNC: 35.5 G/DL (ref 33–37)
MCV RBC AUTO: 91.6 FL (ref 80–94)
PLATELET # BLD AUTO: 159 10*3/MM3 (ref 130–400)
PMV BLD AUTO: 10.7 FL (ref 6–10)
RBC # BLD AUTO: 3.47 10*6/MM3 (ref 4.2–5.4)
UNIT  ABO: NORMAL
UNIT  ABO: NORMAL
UNIT  RH: NORMAL
UNIT  RH: NORMAL
WBC NRBC COR # BLD: 6.75 10*3/MM3 (ref 4.5–12.5)

## 2018-04-20 PROCEDURE — G0378 HOSPITAL OBSERVATION PER HR: HCPCS

## 2018-04-20 PROCEDURE — 85027 COMPLETE CBC AUTOMATED: CPT | Performed by: OBSTETRICS & GYNECOLOGY

## 2018-04-20 PROCEDURE — 25010000002 ENOXAPARIN PER 10 MG: Performed by: OBSTETRICS & GYNECOLOGY

## 2018-04-20 PROCEDURE — 25010000002 PROMETHAZINE PER 50 MG: Performed by: OBSTETRICS & GYNECOLOGY

## 2018-04-20 RX ORDER — IBUPROFEN 600 MG/1
600 TABLET ORAL EVERY 6 HOURS PRN
Qty: 30 TABLET | Refills: 0 | Status: SHIPPED | OUTPATIENT
Start: 2018-04-20 | End: 2018-05-20

## 2018-04-20 RX ORDER — OXYCODONE HYDROCHLORIDE AND ACETAMINOPHEN 5; 325 MG/1; MG/1
1 TABLET ORAL EVERY 4 HOURS PRN
Qty: 25 TABLET | Refills: 0 | Status: SHIPPED | OUTPATIENT
Start: 2018-04-20 | End: 2018-04-23 | Stop reason: HOSPADM

## 2018-04-20 RX ORDER — DOCUSATE CALCIUM 240 MG
240 CAPSULE ORAL DAILY
Qty: 30 CAPSULE | Refills: 1 | Status: SHIPPED | OUTPATIENT
Start: 2018-04-20 | End: 2019-04-01

## 2018-04-20 RX ADMIN — SODIUM CHLORIDE, POTASSIUM CHLORIDE, SODIUM LACTATE AND CALCIUM CHLORIDE 125 ML/HR: 600; 310; 30; 20 INJECTION, SOLUTION INTRAVENOUS at 22:07

## 2018-04-20 RX ADMIN — PROMETHAZINE HYDROCHLORIDE 12.5 MG: 25 INJECTION INTRAMUSCULAR; INTRAVENOUS at 04:32

## 2018-04-20 RX ADMIN — Medication 1000 MCG: at 08:46

## 2018-04-20 RX ADMIN — ONDANSETRON 4 MG: 4 TABLET, FILM COATED ORAL at 11:44

## 2018-04-20 RX ADMIN — ENOXAPARIN SODIUM 40 MG: 40 INJECTION SUBCUTANEOUS at 08:46

## 2018-04-20 RX ADMIN — FOLIC ACID 1 MG: 1 TABLET ORAL at 08:46

## 2018-04-20 RX ADMIN — ASPIRIN 81 MG: 81 TABLET ORAL at 08:46

## 2018-04-20 RX ADMIN — IBUPROFEN 800 MG: 800 TABLET ORAL at 22:06

## 2018-04-20 RX ADMIN — IBUPROFEN 800 MG: 800 TABLET ORAL at 05:57

## 2018-04-20 RX ADMIN — FERROUS SULFATE TAB 325 MG (65 MG ELEMENTAL FE) 325 MG: 325 (65 FE) TAB at 08:46

## 2018-04-20 RX ADMIN — IBUPROFEN 800 MG: 800 TABLET ORAL at 15:00

## 2018-04-20 RX ADMIN — OXYCODONE HYDROCHLORIDE AND ACETAMINOPHEN 1 TABLET: 5; 325 TABLET ORAL at 11:44

## 2018-04-20 RX ADMIN — SODIUM CHLORIDE, POTASSIUM CHLORIDE, SODIUM LACTATE AND CALCIUM CHLORIDE 125 ML/HR: 600; 310; 30; 20 INJECTION, SOLUTION INTRAVENOUS at 15:11

## 2018-04-20 RX ADMIN — OXYCODONE HYDROCHLORIDE AND ACETAMINOPHEN 1 TABLET: 5; 325 TABLET ORAL at 15:10

## 2018-04-20 NOTE — PROGRESS NOTES
"Subjective    The patient has not complaints.  Tolerating oral intake.  Pain is controlled. +ambulation.    Still a little groggy and hasn't ambulated.  She states that she always recoveres slowly from surgery.              Objective     BP 98/52 (BP Location: Right arm, Patient Position: Lying)   Pulse 73   Temp 98.1 °F (36.7 °C) (Oral)   Resp 16   Ht 167.6 cm (66\")   Wt 72.1 kg (159 lb)   LMP 04/07/2018 (Exact Date)   SpO2 97%   Breastfeeding? No   BMI 25.66 kg/m²   General:  alert, appears stated age and cooperative   Abdomen: soft, bowel sounds active, non-tender   Incision:   healing well, no drainage, no erythema, no hernia, no seroma, no swelling, no dehiscence, incision well approximated         Assessment      {doing well:88269::\"Doing well postoperatively.\"     Plan     1. Continue any current medications.  2. Wound care discussed.  "

## 2018-04-20 NOTE — DISCHARGE SUMMARY
Date of Discharge:  4/20/2018    Discharge Diagnosis:   Fibroid uterus  Abnormal uterine bleeding  Pelvic pain    Problem List:  Active Problems:    Abnormal uterine bleeding (AUB)      Presenting Problem/History of Present Illness  Abnormal uterine bleeding (AUB) [N93.9]      Hospital Course  Patient is a 42 y.o. female presented with the above diagnosis and underwent elective surgery.  She did well.     Procedures Performed  Procedure(s):  TOTAL LAPAROSCOPIC HYSTERECTOMY Left SALPINGO OOPHERECTOMY WITH DAVINCI SI ROBOT       Consults:   Consults     No orders found for last 30 day(s).          Pertinent Test Results:    Condition on Discharge: Stable  Vital Signs  Temp:  [96.9 °F (36.1 °C)-98.5 °F (36.9 °C)] 98.1 °F (36.7 °C)  Heart Rate:  [59-99] 73  Resp:  [13-18] 16  BP: ()/(52-78) 98/52    Discharge Disposition      Discharge Medications   Powers Betty   Home Medication Instructions OBEY:[id number]    Printed on:04/20/18 0846   Medication Information                      aspirin 81 MG EC tablet  Take 81 mg by mouth Daily.             ferrous sulfate 325 (65 FE) MG tablet  Take 325 mg by mouth Daily With Breakfast.             folic acid (FOLVITE) 1 MG tablet  Take 1 mg by mouth Daily.             vitamin B-12 (CYANOCOBALAMIN) 1000 MCG tablet  Take 1,000 mcg by mouth Daily.                 Discharge Diet       Activity at Discharge      Follow-up Appointments  No future appointments.        Time: Discharge 15 min

## 2018-04-21 PROCEDURE — 25010000002 ONDANSETRON PER 1 MG: Performed by: OBSTETRICS & GYNECOLOGY

## 2018-04-21 PROCEDURE — 25010000002 ENOXAPARIN PER 10 MG: Performed by: OBSTETRICS & GYNECOLOGY

## 2018-04-21 PROCEDURE — 25010000002 PROMETHAZINE PER 50 MG: Performed by: OBSTETRICS & GYNECOLOGY

## 2018-04-21 PROCEDURE — G0378 HOSPITAL OBSERVATION PER HR: HCPCS

## 2018-04-21 RX ADMIN — SODIUM CHLORIDE, POTASSIUM CHLORIDE, SODIUM LACTATE AND CALCIUM CHLORIDE 125 ML/HR: 600; 310; 30; 20 INJECTION, SOLUTION INTRAVENOUS at 17:58

## 2018-04-21 RX ADMIN — OXYCODONE HYDROCHLORIDE AND ACETAMINOPHEN 1 TABLET: 5; 325 TABLET ORAL at 18:34

## 2018-04-21 RX ADMIN — ASPIRIN 81 MG: 81 TABLET ORAL at 08:42

## 2018-04-21 RX ADMIN — IBUPROFEN 800 MG: 800 TABLET ORAL at 22:19

## 2018-04-21 RX ADMIN — Medication 1000 MCG: at 08:42

## 2018-04-21 RX ADMIN — SODIUM CHLORIDE, POTASSIUM CHLORIDE, SODIUM LACTATE AND CALCIUM CHLORIDE 125 ML/HR: 600; 310; 30; 20 INJECTION, SOLUTION INTRAVENOUS at 06:10

## 2018-04-21 RX ADMIN — FERROUS SULFATE TAB 325 MG (65 MG ELEMENTAL FE) 325 MG: 325 (65 FE) TAB at 08:42

## 2018-04-21 RX ADMIN — ONDANSETRON 4 MG: 2 INJECTION INTRAMUSCULAR; INTRAVENOUS at 17:58

## 2018-04-21 RX ADMIN — IBUPROFEN 800 MG: 800 TABLET ORAL at 06:10

## 2018-04-21 RX ADMIN — PROMETHAZINE HYDROCHLORIDE 12.5 MG: 25 INJECTION INTRAMUSCULAR; INTRAVENOUS at 14:01

## 2018-04-21 RX ADMIN — ENOXAPARIN SODIUM 40 MG: 40 INJECTION SUBCUTANEOUS at 08:42

## 2018-04-21 RX ADMIN — ONDANSETRON 4 MG: 2 INJECTION INTRAMUSCULAR; INTRAVENOUS at 08:42

## 2018-04-21 RX ADMIN — OXYCODONE HYDROCHLORIDE AND ACETAMINOPHEN 1 TABLET: 5; 325 TABLET ORAL at 22:19

## 2018-04-21 RX ADMIN — FOLIC ACID 1 MG: 1 TABLET ORAL at 08:42

## 2018-04-21 RX ADMIN — IBUPROFEN 800 MG: 800 TABLET ORAL at 14:01

## 2018-04-21 NOTE — PLAN OF CARE
Problem: Patient Care Overview  Goal: Plan of Care Review  Outcome: Ongoing (interventions implemented as appropriate)   04/21/18 0256   Coping/Psychosocial   Plan of Care Reviewed With patient   Plan of Care Review   Progress improving     Goal: Individualization and Mutuality  Outcome: Ongoing (interventions implemented as appropriate)    Goal: Discharge Needs Assessment  Outcome: Ongoing (interventions implemented as appropriate)      Problem: Hysterectomy (Adult)  Goal: Signs and Symptoms of Listed Potential Problems Will be Absent, Minimized or Managed (Hysterectomy)  Outcome: Ongoing (interventions implemented as appropriate)

## 2018-04-21 NOTE — PLAN OF CARE
Problem: Patient Care Overview  Goal: Plan of Care Review  Outcome: Ongoing (interventions implemented as appropriate)   04/21/18 2770   Coping/Psychosocial   Plan of Care Reviewed With patient   Plan of Care Review   Progress improving

## 2018-04-21 NOTE — PROGRESS NOTES
"  Subjective     Subjective  Patient reports:  Pain is controlled with narcotic analgesics including Percocet.  She is ambulating. Tolerating po -- normal for liquids and decreased for solids.  Intake -- c/o of c/o of nausea.   flatus.  Voiding -- without difficulty.    Objective     Objective  Physical Exam:  Incision: clean, dry, and intact   Lungs: clear to auscultation   Abdomen: abdomen is soft without significant tenderness, masses, organomegaly or guarding.   Extremities:  extremities normal, atraumatic, no cyanosis or edema         [unfilled]    Vital Sign Min/Max    Temp  Min: 97.9 °F (36.6 °C)  Max: 98.3 °F (36.8 °C)   Pulse  Min: 70  Max: 83   Resp  Min: 16  Max: 16   BP  Min: 106/67  Max: 111/64   No Data Recorded   SpO2  Min: 97 %  Max: 97 %   No Data Recorded     Flowsheet Rows    Flowsheet Row First Filed Value   Admission Height 167.6 cm (66\") Documented at 04/19/2018 0700   Admission Weight 72.1 kg (159 lb) Documented at 04/19/2018 0700          Intake/Output last 24 hours:  No intake or output data in the 24 hours ending 04/21/18 1440    Intake/Output this shift:  No intake/output data recorded.    Labs/Studies reviewed:  Yes   Radiology studies reviewed:   Yes   Medications reviewed:   Yes     Assessment/Plan     Active Problems:    Abnormal uterine bleeding (AUB)      Post op day 2 Procedure(s):  TOTAL LAPAROSCOPIC HYSTERECTOMY Left SALPINGO OOPHERECTOMY WITH DAVINCI SI ROBOT Doing well postoperatively. Pt c/o nausea, unable to tolerate PO solids.     Plan:  Continue routine postop care. Pt encouraged to ambulate and increase PO intake. Will d/c this evening or tomorrow.       Terri Siegel, DO  04/21/18  2:40 PM            "

## 2018-04-22 PROCEDURE — G0378 HOSPITAL OBSERVATION PER HR: HCPCS

## 2018-04-22 PROCEDURE — 25010000002 ONDANSETRON PER 1 MG: Performed by: OBSTETRICS & GYNECOLOGY

## 2018-04-22 PROCEDURE — 94799 UNLISTED PULMONARY SVC/PX: CPT

## 2018-04-22 PROCEDURE — 25010000002 ENOXAPARIN PER 10 MG: Performed by: OBSTETRICS & GYNECOLOGY

## 2018-04-22 PROCEDURE — 25010000002 PROMETHAZINE PER 50 MG: Performed by: OBSTETRICS & GYNECOLOGY

## 2018-04-22 RX ADMIN — Medication 1000 MCG: at 11:07

## 2018-04-22 RX ADMIN — FOLIC ACID 1 MG: 1 TABLET ORAL at 11:06

## 2018-04-22 RX ADMIN — FERROUS SULFATE TAB 325 MG (65 MG ELEMENTAL FE) 325 MG: 325 (65 FE) TAB at 11:07

## 2018-04-22 RX ADMIN — ONDANSETRON 4 MG: 2 INJECTION INTRAMUSCULAR; INTRAVENOUS at 15:56

## 2018-04-22 RX ADMIN — IBUPROFEN 800 MG: 800 TABLET ORAL at 15:56

## 2018-04-22 RX ADMIN — PROMETHAZINE HYDROCHLORIDE 12.5 MG: 25 INJECTION INTRAMUSCULAR; INTRAVENOUS at 07:51

## 2018-04-22 RX ADMIN — OXYCODONE HYDROCHLORIDE AND ACETAMINOPHEN 1 TABLET: 5; 325 TABLET ORAL at 15:56

## 2018-04-22 RX ADMIN — OXYCODONE HYDROCHLORIDE AND ACETAMINOPHEN 1 TABLET: 5; 325 TABLET ORAL at 22:10

## 2018-04-22 RX ADMIN — ENOXAPARIN SODIUM 40 MG: 40 INJECTION SUBCUTANEOUS at 11:07

## 2018-04-22 RX ADMIN — OXYCODONE HYDROCHLORIDE AND ACETAMINOPHEN 1 TABLET: 5; 325 TABLET ORAL at 06:12

## 2018-04-22 RX ADMIN — IBUPROFEN 800 MG: 800 TABLET ORAL at 22:11

## 2018-04-22 RX ADMIN — IBUPROFEN 800 MG: 800 TABLET ORAL at 06:12

## 2018-04-22 RX ADMIN — ASPIRIN 81 MG: 81 TABLET ORAL at 11:07

## 2018-04-22 RX ADMIN — ONDANSETRON 4 MG: 2 INJECTION INTRAMUSCULAR; INTRAVENOUS at 22:11

## 2018-04-22 NOTE — PROGRESS NOTES
"  Subjective     Subjective  Patient reports:  Pain is controlled with narcotic analgesics including Percocet.  She is ambulating. Tolerating po -- normal for liquids and decreased for solids.  Intake -- c/o of c/o of nausea/vomiting: undigested food.   flatus.  Voiding -- without difficulty.    Objective     Objective  Physical Exam:  Incision: clean, dry, and intact   Lungs: clear to auscultation   Abdomen: abdomen is soft without significant tenderness, masses, organomegaly or guarding.   Extremities:  extremities normal, atraumatic, no cyanosis or edema         [unfilled]    Vital Sign Min/Max    Temp  Min: 97.8 °F (36.6 °C)  Max: 98.2 °F (36.8 °C)   Pulse  Min: 64  Max: 68   Resp  Min: 18  Max: 18   BP  Min: 99/53  Max: 106/55   No Data Recorded   SpO2  Min: 97 %  Max: 97 %   No Data Recorded     Flowsheet Rows    Flowsheet Row First Filed Value   Admission Height 167.6 cm (66\") Documented at 04/19/2018 0700   Admission Weight 72.1 kg (159 lb) Documented at 04/19/2018 0700          Intake/Output last 24 hours:  No intake or output data in the 24 hours ending 04/22/18 1244    Intake/Output this shift:  No intake/output data recorded.    Labs/Studies reviewed:  Yes   Radiology studies reviewed:   Yes   Medications reviewed:   Yes     Assessment/Plan     Active Problems:    Abnormal uterine bleeding (AUB)      Post op day 3 Procedure(s):  TOTAL LAPAROSCOPIC HYSTERECTOMY Left SALPINGO OOPHERECTOMY WITH DAVINCI SI ROBOT Doing well postoperatively, still c/o N/V.    Plan:  Continue routine postop care.  Encouraged pt to ambulate and take antiemetics, inc PO intake. Plan for d/c this evening or tomorrow.       Terri Siegel, DO  04/22/18  12:44 PM            "

## 2018-04-22 NOTE — PLAN OF CARE
Problem: Patient Care Overview  Goal: Plan of Care Review  Outcome: Ongoing (interventions implemented as appropriate)   04/22/18 8216   Coping/Psychosocial   Plan of Care Reviewed With patient   Plan of Care Review   Progress no change

## 2018-04-22 NOTE — PLAN OF CARE
Problem: Patient Care Overview  Goal: Individualization and Mutuality  Outcome: Ongoing (interventions implemented as appropriate)   04/19/18 0742   Mutuality/Individual Preferences   What Information Would Help Us Give You More Personalized Care? None   Individualization   Patient Specific Preferences None       Problem: Hysterectomy (Adult)  Goal: Signs and Symptoms of Listed Potential Problems Will be Absent, Minimized or Managed (Hysterectomy)  Outcome: Ongoing (interventions implemented as appropriate)   04/21/18 0256   Goal/Outcome Evaluation   Problems Assessed (Hysterectomy) all   Problems Present (Hysterectomy) none

## 2018-04-23 VITALS
TEMPERATURE: 98.1 F | DIASTOLIC BLOOD PRESSURE: 61 MMHG | HEIGHT: 66 IN | RESPIRATION RATE: 18 BRPM | WEIGHT: 159 LBS | SYSTOLIC BLOOD PRESSURE: 111 MMHG | HEART RATE: 68 BPM | BODY MASS INDEX: 25.55 KG/M2 | OXYGEN SATURATION: 94 %

## 2018-04-23 LAB
LAB AP CASE REPORT: NORMAL
Lab: NORMAL
PATH REPORT.FINAL DX SPEC: NORMAL

## 2018-04-23 PROCEDURE — 25010000002 ENOXAPARIN PER 10 MG: Performed by: OBSTETRICS & GYNECOLOGY

## 2018-04-23 PROCEDURE — 25010000002 PROMETHAZINE PER 50 MG: Performed by: OBSTETRICS & GYNECOLOGY

## 2018-04-23 PROCEDURE — G0378 HOSPITAL OBSERVATION PER HR: HCPCS

## 2018-04-23 RX ORDER — HYDROCODONE BITARTRATE AND ACETAMINOPHEN 5; 325 MG/1; MG/1
1 TABLET ORAL EVERY 4 HOURS PRN
Status: DISCONTINUED | OUTPATIENT
Start: 2018-04-23 | End: 2018-04-23 | Stop reason: HOSPADM

## 2018-04-23 RX ORDER — HYDROCODONE BITARTRATE AND ACETAMINOPHEN 5; 325 MG/1; MG/1
1 TABLET ORAL EVERY 4 HOURS PRN
Qty: 25 TABLET | Refills: 0 | Status: SHIPPED | OUTPATIENT
Start: 2018-04-23 | End: 2018-04-30

## 2018-04-23 RX ORDER — ONDANSETRON 4 MG/1
4 TABLET, ORALLY DISINTEGRATING ORAL EVERY 6 HOURS PRN
Qty: 20 TABLET | Refills: 0 | Status: SHIPPED | OUTPATIENT
Start: 2018-04-23 | End: 2019-04-01

## 2018-04-23 RX ADMIN — ONDANSETRON 4 MG: 4 TABLET, FILM COATED ORAL at 05:34

## 2018-04-23 RX ADMIN — ENOXAPARIN SODIUM 40 MG: 40 INJECTION SUBCUTANEOUS at 08:13

## 2018-04-23 RX ADMIN — PROMETHAZINE HYDROCHLORIDE 12.5 MG: 25 INJECTION INTRAMUSCULAR; INTRAVENOUS at 00:40

## 2018-04-23 RX ADMIN — FERROUS SULFATE TAB 325 MG (65 MG ELEMENTAL FE) 325 MG: 325 (65 FE) TAB at 08:13

## 2018-04-23 RX ADMIN — ASPIRIN 81 MG: 81 TABLET ORAL at 08:13

## 2018-04-23 RX ADMIN — IBUPROFEN 800 MG: 800 TABLET ORAL at 05:34

## 2018-04-23 RX ADMIN — SODIUM CHLORIDE, POTASSIUM CHLORIDE, SODIUM LACTATE AND CALCIUM CHLORIDE 125 ML/HR: 600; 310; 30; 20 INJECTION, SOLUTION INTRAVENOUS at 00:41

## 2018-04-23 RX ADMIN — FOLIC ACID 1 MG: 1 TABLET ORAL at 08:13

## 2018-04-23 RX ADMIN — Medication 1000 MCG: at 08:12

## 2018-04-23 NOTE — DISCHARGE INSTR - ACTIVITY
No lifting pushing or pulling over 10 lbs for 6 weeks.  You may shower but no tub baths or submersion in water for 6 weeks.  No tampons douching or intercourse until you are told it is ok to do so by your doctor.  No driving for 2 weeks.  Notify your doctor for fever, chills, worsening abdominal pain, redness drainage or hard painful areas at your incision sites, vomiting and a decrease in the amount of urine you pass.  See attached education sheets.

## 2018-04-23 NOTE — DISCHARGE SUMMARY
Date of Discharge:  4/23/2018    Discharge Diagnosis:   Fibroid uterus  Abnormal uterine bleeding  Pelvic pain    Problem List:  Active Problems:    Abnormal uterine bleeding (AUB)    Uterine Leiomyoma    Pelvic Pain    Presenting Problem/History of Present Illness  Abnormal uterine bleeding (AUB) [N93.9]      Hospital Course  Patient is a 42 y.o. female presented with AUB and underwent total laparoscopic hysterectomy with left salpingo oopherectomy with Davinci SI.  Pt tolerated the procedure well. She had some concerns with tolerated diet and liquid PO course.  Encouraged bland diet and started on antiemetic therapy. Plan for discharge home today     Procedures Performed  Procedure(s):  TOTAL LAPAROSCOPIC HYSTERECTOMY Left SALPINGO OOPHERECTOMY WITH DAVINCI SI ROBOT     Pertinent Test Results:    Condition on Discharge: Stable  Vital Signs  Temp:  [98 °F (36.7 °C)-98.1 °F (36.7 °C)] 98.1 °F (36.7 °C)  Heart Rate:  [64-68] 68  Resp:  [18] 18  BP: (102-111)/(59-61) 111/61    Discharge Disposition  Home or Self Care    Discharge Medications   Betty Powers   Home Medication Instructions OBEY:[id number]    Printed on:04/23/18 0909   Medication Information                      aspirin 81 MG EC tablet  Take 81 mg by mouth Daily.             docusate calcium (SURFAK) 240 MG capsule  Take 1 capsule by mouth Daily.             ferrous sulfate 325 (65 FE) MG tablet  Take 325 mg by mouth Daily With Breakfast.             folic acid (FOLVITE) 1 MG tablet  Take 1 mg by mouth Daily.             ibuprofen (ADVIL,MOTRIN) 600 MG tablet  Take 1 tablet by mouth Every 6 (Six) Hours As Needed for Mild Pain.             ondansetron (ZOFRAN) 4 MG tablet  Take 1 tablet by mouth Every 6 (Six) Hours As Needed for Nausea or Vomiting.             oxyCODONE-acetaminophen (PERCOCET) 5-325 MG per tablet  Take 1 tablet by mouth Every 4 (Four) Hours As Needed for Moderate Pain.             vitamin B-12 (CYANOCOBALAMIN) 1000 MCG tablet  Take  1,000 mcg by mouth Daily.               Zofran 4mg PO q 6 hr PRN nausea     Discharge Diet   Dyer Brat Diet     Activity at Discharge  as tolerated    Follow-up Appointments  1 week with Dr. Laurent PO appt        Time: Discharge 15 min

## 2018-04-23 NOTE — PROGRESS NOTES
"PO DAY #4     Subjective     Subjective  Patient reports:  Pain is controlled with narcotic analgesics including Percocet.  She is ambulating with no concerns. Tolerating po -- normal for liquids and decreased for solids.  Intake -- c/o of c/o of nausea/vomiting: undigested food.   + gas + BM + Voiding -- without difficulty.    Objective     Objective:  Vital signs (most recent): Blood pressure 111/61, pulse 68, temperature 98.1 °F (36.7 °C), temperature source Oral, resp. rate 18, height 167.6 cm (66\"), weight 72.1 kg (159 lb), last menstrual period 04/07/2018, SpO2 94 %, not currently breastfeeding.    Physical Exam:  Incision: clean, dry, and intact   Lungs: clear to auscultation   Abdomen: abdomen is soft without significant tenderness, masses, organomegaly or guarding.   Extremities:  extremities normal, atraumatic, no cyanosis or edema         [unfilled]    Vital Sign Min/Max    Temp  Min: 98 °F (36.7 °C)  Max: 98.1 °F (36.7 °C)   Pulse  Min: 64  Max: 68   Resp  Min: 18  Max: 18   BP  Min: 102/59  Max: 111/61   No Data Recorded   SpO2  Min: 94 %  Max: 97 %   No Data Recorded     Flowsheet Rows    Flowsheet Row First Filed Value   Admission Height 167.6 cm (66\") Documented at 04/19/2018 0700   Admission Weight 72.1 kg (159 lb) Documented at 04/19/2018 0700        Labs/Studies reviewed:  Yes     Medications reviewed:   Yes     Assessment/Plan     Active Problems:    Abnormal uterine bleeding (AUB)      Post op day 4 Procedure(s):  TOTAL LAPAROSCOPIC HYSTERECTOMY Left SALPINGO OOPHERECTOMY WITH DAVINCI SI ROBOT Pt states that she recovers slowly from surgery in the past.  States to have had concerns with Nausea and Emesis.    Plan:  Continue routine postop care.  Encouraged pt to continue with ambulation. Take PO antiemetic therapy.  Normal bowel/bladder function.  [place]/BRAT diet for home.  resume D/C home today      LOC Whittaker  04/23/18  8:58 AM            "

## 2018-05-09 ENCOUNTER — APPOINTMENT (OUTPATIENT)
Dept: ULTRASOUND IMAGING | Facility: HOSPITAL | Age: 43
End: 2018-05-09

## 2018-05-09 ENCOUNTER — HOSPITAL ENCOUNTER (EMERGENCY)
Facility: HOSPITAL | Age: 43
Discharge: HOME | End: 2018-05-10
Attending: EMERGENCY MEDICINE | Admitting: EMERGENCY MEDICINE

## 2018-05-09 DIAGNOSIS — M79.605 PAIN OF LEFT LOWER EXTREMITY: Primary | ICD-10-CM

## 2018-05-09 LAB
ALBUMIN SERPL-MCNC: 4.2 G/DL (ref 3.5–5)
ALBUMIN/GLOB SERPL: 1.4 G/DL (ref 1.5–2.5)
ALP SERPL-CCNC: 65 U/L (ref 35–104)
ALT SERPL W P-5'-P-CCNC: 9 U/L (ref 10–36)
ANION GAP SERPL CALCULATED.3IONS-SCNC: 6.2 MMOL/L (ref 3.6–11.2)
APTT PPP: 30.3 SECONDS (ref 23.8–36.1)
AST SERPL-CCNC: 12 U/L (ref 10–30)
BASOPHILS # BLD AUTO: 0.04 10*3/MM3 (ref 0–0.3)
BASOPHILS NFR BLD AUTO: 0.6 % (ref 0–2)
BILIRUB SERPL-MCNC: 2.3 MG/DL (ref 0.2–1.8)
BUN BLD-MCNC: 9 MG/DL (ref 7–21)
BUN/CREAT SERPL: 11.5 (ref 7–25)
CALCIUM SPEC-SCNC: 8.9 MG/DL (ref 7.7–10)
CHLORIDE SERPL-SCNC: 107 MMOL/L (ref 99–112)
CO2 SERPL-SCNC: 25.8 MMOL/L (ref 24.3–31.9)
CREAT BLD-MCNC: 0.78 MG/DL (ref 0.43–1.29)
DEPRECATED RDW RBC AUTO: 53.8 FL (ref 37–54)
EOSINOPHIL # BLD AUTO: 0.33 10*3/MM3 (ref 0–0.7)
EOSINOPHIL NFR BLD AUTO: 4.7 % (ref 0–5)
ERYTHROCYTE [DISTWIDTH] IN BLOOD BY AUTOMATED COUNT: 16.5 % (ref 11.5–14.5)
GFR SERPL CREATININE-BSD FRML MDRD: 81 ML/MIN/1.73
GLOBULIN UR ELPH-MCNC: 2.9 GM/DL
GLUCOSE BLD-MCNC: 94 MG/DL (ref 70–110)
HCT VFR BLD AUTO: 34.2 % (ref 37–47)
HGB BLD-MCNC: 12.4 G/DL (ref 12–16)
IMM GRANULOCYTES # BLD: 0.02 10*3/MM3 (ref 0–0.03)
IMM GRANULOCYTES NFR BLD: 0.3 % (ref 0–0.5)
INR PPP: 1.07 (ref 0.9–1.1)
LYMPHOCYTES # BLD AUTO: 1.85 10*3/MM3 (ref 1–3)
LYMPHOCYTES NFR BLD AUTO: 26.6 % (ref 21–51)
MCH RBC QN AUTO: 33.7 PG (ref 27–33)
MCHC RBC AUTO-ENTMCNC: 36.3 G/DL (ref 33–37)
MCV RBC AUTO: 92.9 FL (ref 80–94)
MONOCYTES # BLD AUTO: 0.53 10*3/MM3 (ref 0.1–0.9)
MONOCYTES NFR BLD AUTO: 7.6 % (ref 0–10)
NEUTROPHILS # BLD AUTO: 4.18 10*3/MM3 (ref 1.4–6.5)
NEUTROPHILS NFR BLD AUTO: 60.2 % (ref 30–70)
OSMOLALITY SERPL CALC.SUM OF ELEC: 276 MOSM/KG (ref 273–305)
PLATELET # BLD AUTO: 229 10*3/MM3 (ref 130–400)
PMV BLD AUTO: 10.4 FL (ref 6–10)
POTASSIUM BLD-SCNC: 3.4 MMOL/L (ref 3.5–5.3)
PROT SERPL-MCNC: 7.1 G/DL (ref 6–8)
PROTHROMBIN TIME: 14.1 SECONDS (ref 11–15.4)
RBC # BLD AUTO: 3.68 10*6/MM3 (ref 4.2–5.4)
SODIUM BLD-SCNC: 139 MMOL/L (ref 135–153)
WBC NRBC COR # BLD: 6.95 10*3/MM3 (ref 4.5–12.5)

## 2018-05-09 PROCEDURE — 85610 PROTHROMBIN TIME: CPT | Performed by: EMERGENCY MEDICINE

## 2018-05-09 PROCEDURE — 99284 EMERGENCY DEPT VISIT MOD MDM: CPT

## 2018-05-09 PROCEDURE — 80053 COMPREHEN METABOLIC PANEL: CPT | Performed by: EMERGENCY MEDICINE

## 2018-05-09 PROCEDURE — 93970 EXTREMITY STUDY: CPT

## 2018-05-09 PROCEDURE — 85730 THROMBOPLASTIN TIME PARTIAL: CPT | Performed by: EMERGENCY MEDICINE

## 2018-05-09 PROCEDURE — 85025 COMPLETE CBC W/AUTO DIFF WBC: CPT | Performed by: EMERGENCY MEDICINE

## 2018-05-09 PROCEDURE — 93970 EXTREMITY STUDY: CPT | Performed by: RADIOLOGY

## 2018-05-10 VITALS
HEIGHT: 66 IN | RESPIRATION RATE: 16 BRPM | SYSTOLIC BLOOD PRESSURE: 118 MMHG | TEMPERATURE: 98.4 F | OXYGEN SATURATION: 98 % | DIASTOLIC BLOOD PRESSURE: 73 MMHG | WEIGHT: 156 LBS | HEART RATE: 74 BPM | BODY MASS INDEX: 25.07 KG/M2

## 2018-05-10 NOTE — ED NOTES
Bedside report received from CEDRCIK Bartlett. Patient reports that she had a hysterectomy 3 weeks ago and was receiving Lovenox during her hospital stay for DVT prophylaxis. States that about 3 hours ago she has been having pain behind her left knee. States that she is concerned for a blood clot because she has had them in the past. No redness, warmth or swelling noted to site. Patient reports tenderness with palpation and pain with walking.      Claudia Isaac RN  05/09/18 0217

## 2018-05-10 NOTE — ED PROVIDER NOTES
Subjective   Patient is a very pleasant 42-year-old lady presented emergency Department today with complaint of possible DVT.  Patient history of DVT in the past.  Patient had a hysterectomy 3 weeks ago.  She says she developed some pain to the back of her left knee was instructed to coming here to rule out DVT.  She has no erythema no warmth.  No pain with range of motion.  No chest pain no shortness of breath.  She has normal pulses in that extremity no clinical signs of DVT.        Lower Extremity Issue   Associated symptoms: no fever        Review of Systems   Constitutional: Negative.  Negative for fever.   HENT: Negative.    Respiratory: Negative.    Cardiovascular: Negative.  Negative for chest pain.   Gastrointestinal: Negative.  Negative for abdominal pain.   Endocrine: Negative.    Genitourinary: Negative.  Negative for dysuria.   Skin: Negative.    Neurological: Negative.    Psychiatric/Behavioral: Negative.    All other systems reviewed and are negative.      Past Medical History:   Diagnosis Date   • Allergic rhinitis    • Dysmenorrhea    • Dysuria    • Headache    • History of blood clots    • Jaundice    • Ovarian tumor     X 3 PER PATIENT   • Pelvic pain    • PONV (postoperative nausea and vomiting)    • Spherocytosis (familial)    • Spherocytosis, hereditary    • Uterine leiomyoma        No Known Allergies    Past Surgical History:   Procedure Laterality Date   • ABDOMINAL SURGERY     • CHOLECYSTECTOMY     • SKIN BIOPSY     • TOTAL LAPAROSCOPIC HYSTERECTOMY N/A 4/19/2018    Procedure: TOTAL LAPAROSCOPIC HYSTERECTOMY Left SALPINGO OOPHERECTOMY WITH DAVINCI SI ROBOT;  Surgeon: Kevin Laurent DO;  Location: [facility];  Service: DaVinci   • TUBAL ABDOMINAL LIGATION         Family History   Problem Relation Age of Onset   • No Known Problems Mother    • Hypertension Father    • Heart disease Father    • No Known Problems Sister    • No Known Problems Brother    • Bell's palsy Brother    • Breast  cancer Neg Hx        Social History     Social History   • Marital status:      Social History Main Topics   • Smoking status: Never Smoker   • Smokeless tobacco: Never Used   • Alcohol use No   • Drug use: No   • Sexual activity: Defer     Other Topics Concern   • Not on file           Objective   Physical Exam   Constitutional: She is oriented to person, place, and time. She appears well-developed and well-nourished.   HENT:   Head: Normocephalic and atraumatic.   Eyes: EOM are normal. Pupils are equal, round, and reactive to light.   Neck: Normal range of motion. Neck supple.   Cardiovascular: Normal rate.    Pulmonary/Chest: Breath sounds normal. No respiratory distress.   Abdominal: She exhibits no distension. There is no tenderness.   Musculoskeletal: Normal range of motion. She exhibits no edema or deformity.   Patient has some tenderness at the distal left hamstring.  Normal strength and that hamstring.  No couple signs of DVT.  No calf tenderness no cords negative homans sign.  Patient has no erythema no pain with range of motion of the knee.  Patient does not have a septic joint. Normal dp/pt pulses b/l   Neurological: She is alert and oriented to person, place, and time.   Skin: Skin is warm and dry. Capillary refill takes less than 2 seconds.   Psychiatric: She has a normal mood and affect. Her behavior is normal.       Procedures           ED Course  ED Course   Comment By Time   The patient's ultrasound read as no DVT by vrad.  Patient has no erythema or warmth no pain with range of motion of the left knee.  It is not a septic joint.  The pain and tenderness is right at the distal hamstring.  She has normal strength.  Patient instructed to follow up with her primary care doctor in the next 2 days. Harry Newsome MD 05/10 0108                  MDM  Number of Diagnoses or Management Options  Pain of left lower extremity:      Amount and/or Complexity of Data Reviewed  Clinical lab  tests: ordered and reviewed  Tests in the radiology section of CPT®: ordered and reviewed          Final diagnoses:   Pain of left lower extremity            Harry Newsome MD  05/10/18 0112

## 2018-08-24 ENCOUNTER — OFFICE VISIT (OUTPATIENT)
Dept: FAMILY MEDICINE CLINIC | Facility: CLINIC | Age: 43
End: 2018-08-24

## 2018-08-24 VITALS
OXYGEN SATURATION: 99 % | HEIGHT: 66 IN | SYSTOLIC BLOOD PRESSURE: 124 MMHG | DIASTOLIC BLOOD PRESSURE: 78 MMHG | BODY MASS INDEX: 25.55 KG/M2 | HEART RATE: 65 BPM | WEIGHT: 159 LBS

## 2018-08-24 DIAGNOSIS — Z91.09 ENVIRONMENTAL ALLERGIES: Primary | ICD-10-CM

## 2018-08-24 DIAGNOSIS — D58.0 HEREDITARY SPHEROCYTOSIS (HCC): ICD-10-CM

## 2018-08-24 DIAGNOSIS — M25.512 ACUTE PAIN OF LEFT SHOULDER: ICD-10-CM

## 2018-08-24 PROCEDURE — 93000 ELECTROCARDIOGRAM COMPLETE: CPT | Performed by: NURSE PRACTITIONER

## 2018-08-24 PROCEDURE — 99213 OFFICE O/P EST LOW 20 MIN: CPT | Performed by: NURSE PRACTITIONER

## 2018-08-24 RX ORDER — FLUTICASONE PROPIONATE 50 MCG
2 SPRAY, SUSPENSION (ML) NASAL DAILY
Qty: 16 G | Refills: 5 | Status: SHIPPED | OUTPATIENT
Start: 2018-08-24 | End: 2019-03-04

## 2018-08-24 RX ORDER — LORATADINE 10 MG/1
10 TABLET ORAL DAILY
Qty: 30 TABLET | Refills: 5 | Status: SHIPPED | OUTPATIENT
Start: 2018-08-24 | End: 2019-03-04

## 2018-08-24 NOTE — PROGRESS NOTES
"Subjective   Betty Powers is a 42 y.o. female.     Chief Complaint: Shoulder Pain (left )    Upper Extremity Issue   This is a new (left shoulder pain; under left shoulder blade and then under right shoulder blade) problem. The current episode started 1 to 4 weeks ago. The problem occurs daily (occurs about 5 times day; only lasts couple of minutes). Progression since onset: pain feels like it radiates to the front of the chest  Associated symptoms include fatigue. Pertinent negatives include no abdominal pain, coughing, myalgias or nausea. Exacerbated by: sharp pain; can occur with sitting still. She has tried NSAIDs for the symptoms. The treatment provided moderate relief.   Ear Fullness    There is pain in both ears. This is a recurrent problem. The current episode started 1 to 4 weeks ago. The problem occurs constantly. The problem has been unchanged. There has been no fever. The patient is experiencing no pain. Pertinent negatives include no abdominal pain or coughing. She has tried antibiotics for the symptoms. The treatment provided mild relief. environmental allergies   She denies any shortness of breath, dizziness or syncope.    She was evaluated by cardiology in Jan 2018 and was dx with patent foramen ovale.  It was recommended that she follow up with a STACI \"to establish dx of PFO  Pt has a hx of hereditary spherocytosis.   EKG appears to be normal.      Family History   Problem Relation Age of Onset   • No Known Problems Mother    • Hypertension Father    • Heart disease Father    • No Known Problems Sister    • No Known Problems Brother    • Bell's palsy Brother    • Breast cancer Neg Hx        Social History     Social History   • Marital status:      Spouse name: N/A   • Number of children: N/A   • Years of education: N/A     Occupational History   • Not on file.     Social History Main Topics   • Smoking status: Never Smoker   • Smokeless tobacco: Never Used   • Alcohol use No   • Drug use: No "   • Sexual activity: Defer     Other Topics Concern   • Not on file     Social History Narrative   • No narrative on file       Past Medical History:   Diagnosis Date   • Allergic rhinitis    • Dysmenorrhea    • Dysuria    • Headache    • History of blood clots    • Jaundice    • Ovarian tumor     X 3 PER PATIENT   • Pelvic pain    • PONV (postoperative nausea and vomiting)    • Spherocytosis (familial) (CMS/HCC)    • Spherocytosis, hereditary (CMS/HCC)    • Uterine leiomyoma        Review of Systems   Constitutional: Positive for fatigue.   HENT: Negative.    Respiratory: Negative.  Negative for cough.    Cardiovascular: Negative.    Gastrointestinal: Negative.  Negative for abdominal pain and nausea.   Musculoskeletal: Positive for back pain. Negative for myalgias.   Skin: Negative.    Neurological: Negative.    Psychiatric/Behavioral: Negative.        Objective   Physical Exam   Constitutional: She is oriented to person, place, and time. She appears well-developed and well-nourished.   Neck: Normal range of motion. Neck supple.   Cardiovascular: Normal rate, regular rhythm and normal heart sounds.    Pulmonary/Chest: Effort normal and breath sounds normal.   Musculoskeletal: Normal range of motion. She exhibits no edema or tenderness.   Neurological: She is alert and oriented to person, place, and time.   Skin: Skin is warm and dry.   Psychiatric: She has a normal mood and affect. Her behavior is normal. Judgment and thought content normal.   Nursing note and vitals reviewed.        ECG 12 Lead  Date/Time: 8/24/2018 5:49 PM  Performed by: PAULA DOWNING  Authorized by: PAULA DOWNING   Comparison: not compared with previous ECG   Rhythm: sinus rhythm  Rate: normal  Conduction: incomplete RBBB  ST Segments: ST segments normal  T Waves: T waves normal  QRS axis: normal  Other: no other findings  Clinical impression: non-specific ECG            Vitals: Blood pressure 124/78, pulse 65, height 167.6 cm  "(65.98\"), weight 72.1 kg (159 lb), last menstrual period 04/07/2018, SpO2 99 %, not currently breastfeeding.    Allergies: No Known Allergies     During this visit the following were done:  Labs Reviewed []    Labs Ordered []    Radiology Reports Reviewed []    Radiology Ordered []    PCP Records Reviewed []    Referring Provider Records Reviewed []    ER Records Reviewed []    Hospital Records Reviewed []    History Obtained From Family []    Radiology Images Reviewed []    Other Reviewed []    Records Requested []      Assessment/Plan   Betty was seen today for shoulder pain.    Diagnoses and all orders for this visit:    Environmental allergies  -     loratadine (CLARITIN) 10 MG tablet; Take 1 tablet by mouth Daily.  -     fluticasone (FLONASE) 50 MCG/ACT nasal spray; 2 sprays into the nostril(s) as directed by provider Daily.    Acute pain of left shoulder    Hereditary spherocytosis (CMS/HCC)        Continue ibuprofen for pain.  To ER for evaluation if pain persists.  Pt to call Dr Hernandez on Monday for appt to have STACI done.       "

## 2018-10-16 ENCOUNTER — APPOINTMENT (OUTPATIENT)
Dept: MAMMOGRAPHY | Facility: HOSPITAL | Age: 43
End: 2018-10-16

## 2018-11-14 ENCOUNTER — APPOINTMENT (OUTPATIENT)
Dept: MAMMOGRAPHY | Facility: HOSPITAL | Age: 43
End: 2018-11-14

## 2019-01-14 ENCOUNTER — HOSPITAL ENCOUNTER (OUTPATIENT)
Dept: MAMMOGRAPHY | Facility: HOSPITAL | Age: 44
Discharge: HOME | End: 2019-01-14
Admitting: NURSE PRACTITIONER

## 2019-01-14 DIAGNOSIS — Z12.39 SCREENING BREAST EXAMINATION: ICD-10-CM

## 2019-01-14 PROCEDURE — 77063 BREAST TOMOSYNTHESIS BI: CPT | Performed by: RADIOLOGY

## 2019-01-14 PROCEDURE — 77063 BREAST TOMOSYNTHESIS BI: CPT

## 2019-01-14 PROCEDURE — 77067 SCR MAMMO BI INCL CAD: CPT

## 2019-01-14 PROCEDURE — 77067 SCR MAMMO BI INCL CAD: CPT | Performed by: RADIOLOGY

## 2019-03-04 ENCOUNTER — OFFICE VISIT (OUTPATIENT)
Dept: FAMILY MEDICINE CLINIC | Facility: CLINIC | Age: 44
End: 2019-03-04

## 2019-03-04 VITALS
TEMPERATURE: 98.4 F | OXYGEN SATURATION: 99 % | WEIGHT: 163.2 LBS | HEART RATE: 67 BPM | SYSTOLIC BLOOD PRESSURE: 95 MMHG | BODY MASS INDEX: 26.23 KG/M2 | DIASTOLIC BLOOD PRESSURE: 65 MMHG | HEIGHT: 66 IN

## 2019-03-04 DIAGNOSIS — R31.1 BENIGN ESSENTIAL MICROSCOPIC HEMATURIA: ICD-10-CM

## 2019-03-04 DIAGNOSIS — D58.0 HEREDITARY SPHEROCYTOSIS (HCC): ICD-10-CM

## 2019-03-04 DIAGNOSIS — R10.31 RIGHT LOWER QUADRANT ABDOMINAL PAIN: ICD-10-CM

## 2019-03-04 DIAGNOSIS — R30.0 BURNING WITH URINATION: Primary | ICD-10-CM

## 2019-03-04 DIAGNOSIS — R23.0 TOE CYANOSIS: ICD-10-CM

## 2019-03-04 LAB
ALBUMIN SERPL-MCNC: 4.4 G/DL (ref 3.5–5)
ALBUMIN/GLOB SERPL: 1.5 G/DL (ref 1.5–2.5)
ALP SERPL-CCNC: 62 U/L (ref 35–104)
ALT SERPL W P-5'-P-CCNC: 16 U/L (ref 10–36)
ANION GAP SERPL CALCULATED.3IONS-SCNC: 9.3 MMOL/L (ref 3.6–11.2)
AST SERPL-CCNC: 22 U/L (ref 10–30)
BASOPHILS # BLD AUTO: 0.02 10*3/MM3 (ref 0–0.3)
BASOPHILS NFR BLD AUTO: 0.3 % (ref 0–2)
BILIRUB BLD-MCNC: NEGATIVE MG/DL
BILIRUB SERPL-MCNC: 3.2 MG/DL (ref 0.2–1.8)
BUN BLD-MCNC: 7 MG/DL (ref 7–21)
BUN/CREAT SERPL: 8.8 (ref 7–25)
CALCIUM SPEC-SCNC: 9.7 MG/DL (ref 7.7–10)
CHLORIDE SERPL-SCNC: 108 MMOL/L (ref 99–112)
CO2 SERPL-SCNC: 26.7 MMOL/L (ref 24.3–31.9)
CREAT BLD-MCNC: 0.8 MG/DL (ref 0.43–1.29)
DEPRECATED RDW RBC AUTO: 53.5 FL (ref 37–54)
EOSINOPHIL # BLD AUTO: 0.12 10*3/MM3 (ref 0–0.7)
EOSINOPHIL NFR BLD AUTO: 1.6 % (ref 0–5)
ERYTHROCYTE [DISTWIDTH] IN BLOOD BY AUTOMATED COUNT: 16.2 % (ref 11.5–14.5)
GFR SERPL CREATININE-BSD FRML MDRD: 78 ML/MIN/1.73
GLOBULIN UR ELPH-MCNC: 2.9 GM/DL
GLUCOSE BLD-MCNC: 89 MG/DL (ref 70–110)
GLUCOSE UR STRIP-MCNC: NEGATIVE MG/DL
HCT VFR BLD AUTO: 36.6 % (ref 37–47)
HGB BLD-MCNC: 12.8 G/DL (ref 12–16)
IMM GRANULOCYTES # BLD AUTO: 0.01 10*3/MM3 (ref 0–0.03)
IMM GRANULOCYTES NFR BLD AUTO: 0.1 % (ref 0–0.5)
KETONES UR QL: NEGATIVE
LEUKOCYTE EST, POC: NEGATIVE
LYMPHOCYTES # BLD AUTO: 1.78 10*3/MM3 (ref 1–3)
LYMPHOCYTES NFR BLD AUTO: 24.5 % (ref 21–51)
MCH RBC QN AUTO: 32.7 PG (ref 27–33)
MCHC RBC AUTO-ENTMCNC: 35 G/DL (ref 33–37)
MCV RBC AUTO: 93.6 FL (ref 80–94)
MONOCYTES # BLD AUTO: 0.55 10*3/MM3 (ref 0.1–0.9)
MONOCYTES NFR BLD AUTO: 7.6 % (ref 0–10)
NEUTROPHILS # BLD AUTO: 4.8 10*3/MM3 (ref 1.4–6.5)
NEUTROPHILS NFR BLD AUTO: 65.9 % (ref 30–70)
NITRITE UR-MCNC: NEGATIVE MG/ML
OSMOLALITY SERPL CALC.SUM OF ELEC: 284.3 MOSM/KG (ref 273–305)
PH UR: 6.5 [PH] (ref 5–8)
PLATELET # BLD AUTO: 239 10*3/MM3 (ref 130–400)
PMV BLD AUTO: 11 FL (ref 6–10)
POTASSIUM BLD-SCNC: 4.2 MMOL/L (ref 3.5–5.3)
PROT SERPL-MCNC: 7.3 G/DL (ref 6–8)
PROT UR STRIP-MCNC: NEGATIVE MG/DL
RBC # BLD AUTO: 3.91 10*6/MM3 (ref 4.2–5.4)
RBC # UR STRIP: ABNORMAL /UL
SODIUM BLD-SCNC: 144 MMOL/L (ref 135–153)
SP GR UR: 1.01 (ref 1–1.03)
UROBILINOGEN UR QL: NORMAL
WBC NRBC COR # BLD: 7.28 10*3/MM3 (ref 4.5–12.5)

## 2019-03-04 PROCEDURE — 87086 URINE CULTURE/COLONY COUNT: CPT | Performed by: NURSE PRACTITIONER

## 2019-03-04 PROCEDURE — 85025 COMPLETE CBC W/AUTO DIFF WBC: CPT | Performed by: NURSE PRACTITIONER

## 2019-03-04 PROCEDURE — 99214 OFFICE O/P EST MOD 30 MIN: CPT | Performed by: NURSE PRACTITIONER

## 2019-03-04 PROCEDURE — 80053 COMPREHEN METABOLIC PANEL: CPT | Performed by: NURSE PRACTITIONER

## 2019-03-04 NOTE — PROGRESS NOTES
Subjective   Betty Powers is a 43 y.o. female.   Chief Compliant: The patient presents with Abdominal Pain (both sides); Urinary Tract Infection (burning with urination); and Vaginitis    Returns today with a couple of concerns.  Seen cardiology last year for evaluation of her heart as she had sudden pain and discoloration of her toes.  History of spherocytosis and suspected thromboembolism.  When she seen cardiology told she had a small hole in her heart that couldn't be repaired.  Told she needed another test to evaluate and she would have to be sedated and patient became concerned.  Today she declines and reoccurrence but would like another opinion.        Abdominal Pain   This is a recurrent problem. The current episode started yesterday. The onset quality is sudden. The problem occurs intermittently. The problem has been waxing and waning. The pain is located in the RLQ. The pain is at a severity of 4/10. The pain is moderate. The quality of the pain is dull and aching. The abdominal pain radiates to the right flank. Associated symptoms include frequency and nausea. Pertinent negatives include no constipation, diarrhea, flatus or vomiting. Nothing aggravates the pain. The pain is relieved by nothing. The treatment provided no relief. Prior diagnostic workup includes CT scan and ultrasound.      The following portions of the patient's history were reviewed and updated as appropriate: allergies, current medications, past family history, past medical history, past social history, past surgical history and problem list.      Review of Systems   Constitutional: Negative.    HENT: Negative.    Respiratory: Negative.    Cardiovascular: Negative.  Negative for palpitations and leg swelling.   Gastrointestinal: Positive for abdominal pain and nausea. Negative for constipation, diarrhea, flatus, rectal pain and vomiting.   Genitourinary: Positive for frequency. Negative for vaginal pain.        Vag itching    "  Musculoskeletal: Negative.    Skin: Negative.    All other systems reviewed and are negative.      Procedures    Vitals: Blood pressure 95/65, pulse 67, temperature 98.4 °F (36.9 °C), temperature source Oral, height 167.6 cm (66\"), weight 74 kg (163 lb 3.2 oz), last menstrual period 04/07/2018, SpO2 99 %, not currently breastfeeding.     Allergies: No Known Allergies       Objective   Physical Exam   Constitutional: She is oriented to person, place, and time. She appears well-developed and well-nourished. No distress.   HENT:   Head: Normocephalic.   Cardiovascular: Normal rate, regular rhythm and normal heart sounds.   No murmur heard.  Pulmonary/Chest: Effort normal and breath sounds normal.   Abdominal: Soft. Normal appearance and bowel sounds are normal. She exhibits no distension and no mass. There is tenderness in the right lower quadrant. There is no rigidity, no rebound, no guarding and no CVA tenderness. No hernia.   Neurological: She is alert and oriented to person, place, and time.   Skin: Skin is warm and dry. She is not diaphoretic.   Psychiatric: She has a normal mood and affect. Her behavior is normal.   Nursing note and vitals reviewed.      During this visit the following were done:  Labs Reviewed []    Labs Ordered [x]    Radiology Reports Reviewed []    Radiology Ordered [x]    PCP Records Reviewed []    Referring Provider Records Reviewed []    ER Records Reviewed []    Hospital Records Reviewed []    History Obtained From Family []    Radiology Images Reviewed []    Other Reviewed []    Records Requested []      Assessment/Plan   Discussed with patient impression and plan, patient verbalizes understanding  Will follow after imaging and labs  Sooner if any new or worsening symptoms    Betty was seen today for abdominal pain, urinary tract infection and vaginitis.    Diagnoses and all orders for this visit:    Burning with urination  -     POCT urinalysis dipstick, automated    Benign essential " microscopic hematuria  -     CT Abdomen Pelvis Without Contrast; Future  -     CBC & Differential  -     Comprehensive Metabolic Panel  -     Urine Culture - Urine, Urine, Clean Catch; Future  -     Urine Culture - Urine, Urine, Clean Catch  -     CBC Auto Differential    Right lower quadrant abdominal pain  -     CT Abdomen Pelvis Without Contrast; Future  -     CBC & Differential  -     Comprehensive Metabolic Panel  -     CBC Auto Differential    Hereditary spherocytosis (CMS/HCC)  -     Ambulatory Referral to Cardiology    Toe cyanosis  -     Ambulatory Referral to Cardiology

## 2019-03-06 ENCOUNTER — TELEPHONE (OUTPATIENT)
Dept: FAMILY MEDICINE CLINIC | Facility: CLINIC | Age: 44
End: 2019-03-06

## 2019-03-06 DIAGNOSIS — R30.9 URINARY PAIN: Primary | ICD-10-CM

## 2019-03-07 LAB — BACTERIA SPEC AEROBE CULT: NORMAL

## 2019-03-07 NOTE — TELEPHONE ENCOUNTER
Labs are ok   Awaiting CT and placed referral to Urology    Left a message to return call.      Patient returned call & verbalized understanding.

## 2019-03-11 ENCOUNTER — TELEPHONE (OUTPATIENT)
Dept: FAMILY MEDICINE CLINIC | Facility: CLINIC | Age: 44
End: 2019-03-11

## 2019-03-11 ENCOUNTER — HOSPITAL ENCOUNTER (OUTPATIENT)
Dept: CT IMAGING | Facility: HOSPITAL | Age: 44
Discharge: HOME | End: 2019-03-11
Admitting: NURSE PRACTITIONER

## 2019-03-11 DIAGNOSIS — R10.31 RIGHT LOWER QUADRANT ABDOMINAL PAIN: ICD-10-CM

## 2019-03-11 DIAGNOSIS — R31.1 BENIGN ESSENTIAL MICROSCOPIC HEMATURIA: ICD-10-CM

## 2019-03-11 PROCEDURE — 74176 CT ABD & PELVIS W/O CONTRAST: CPT | Performed by: RADIOLOGY

## 2019-03-11 PROCEDURE — 74176 CT ABD & PELVIS W/O CONTRAST: CPT

## 2019-03-11 NOTE — TELEPHONE ENCOUNTER
----- Message from OCTAVIA Roach sent at 3/11/2019  3:59 PM EDT -----  Keep appt with urology  CT is neg apart from enlarged spleen     Patient notified & verbalized understanding.

## 2019-04-01 ENCOUNTER — OFFICE VISIT (OUTPATIENT)
Dept: UROLOGY | Facility: CLINIC | Age: 44
End: 2019-04-01

## 2019-04-01 VITALS — WEIGHT: 163 LBS | BODY MASS INDEX: 26.2 KG/M2 | HEIGHT: 66 IN

## 2019-04-01 DIAGNOSIS — R35.0 FREQUENCY OF MICTURITION: Primary | ICD-10-CM

## 2019-04-01 DIAGNOSIS — N39.0 URINARY TRACT INFECTION WITH HEMATURIA, SITE UNSPECIFIED: ICD-10-CM

## 2019-04-01 DIAGNOSIS — R31.9 URINARY TRACT INFECTION WITH HEMATURIA, SITE UNSPECIFIED: ICD-10-CM

## 2019-04-01 LAB
BILIRUB BLD-MCNC: NEGATIVE MG/DL
CLARITY, POC: CLEAR
COLOR UR: YELLOW
GLUCOSE UR STRIP-MCNC: NEGATIVE MG/DL
KETONES UR QL: NEGATIVE
LEUKOCYTE EST, POC: ABNORMAL
NITRITE UR-MCNC: NEGATIVE MG/ML
PH UR: 5 [PH] (ref 5–8)
PROT UR STRIP-MCNC: NEGATIVE MG/DL
RBC # UR STRIP: ABNORMAL /UL
SP GR UR: 1.02 (ref 1–1.03)
UROBILINOGEN UR QL: NORMAL

## 2019-04-01 PROCEDURE — 87086 URINE CULTURE/COLONY COUNT: CPT | Performed by: UROLOGY

## 2019-04-01 PROCEDURE — 99203 OFFICE O/P NEW LOW 30 MIN: CPT | Performed by: UROLOGY

## 2019-04-01 RX ORDER — NITROFURANTOIN 25; 75 MG/1; MG/1
100 CAPSULE ORAL DAILY
Qty: 56 CAPSULE | Refills: 3 | Status: SHIPPED | OUTPATIENT
Start: 2019-04-01 | End: 2019-06-03 | Stop reason: SDUPTHER

## 2019-04-01 NOTE — PROGRESS NOTES
Chief Complaint:          Chief Complaint   Patient presents with   • Blood in Urine     New pt       HPI:   43 y.o. female.  43-year-old white female referred with hematuria.  She has a history of trace stress incontinence.  She gets a urinary tract infection every month.  She started after she passed a stone.  She takes only folic acid and B12.  She has hereditary spherocytosis.  She has had a hysterectomy and cholecystectomy.  Her urine today was trace positive when she has an infection is characterized by dysuria suprapubic pain urgency.  There is no relationship to sexual intercourse.  It started after she began swimming.  She has since stopped    Past Medical History:        Past Medical History:   Diagnosis Date   • Allergic rhinitis    • Dysmenorrhea    • Dysuria    • Headache    • History of blood clots    • Jaundice    • Ovarian tumor     X 3 PER PATIENT   • Pelvic pain    • PONV (postoperative nausea and vomiting)    • Spherocytosis (familial) (CMS/HCC)    • Spherocytosis, hereditary (CMS/HCC)    • Uterine leiomyoma          Current Meds:     Current Outpatient Medications   Medication Sig Dispense Refill   • aspirin 81 MG EC tablet Take 81 mg by mouth Daily.     • ferrous sulfate 325 (65 FE) MG tablet Take 325 mg by mouth Daily With Breakfast.     • folic acid (FOLVITE) 1 MG tablet Take 1 mg by mouth Daily.     • Probiotic Product (PROBIOTIC DAILY PO) Take 1 tablet by mouth Daily.     • vitamin B-12 (CYANOCOBALAMIN) 1000 MCG tablet Take 1,000 mcg by mouth Daily.       No current facility-administered medications for this visit.         Allergies:      No Known Allergies     Past Surgical History:     Past Surgical History:   Procedure Laterality Date   • ABDOMINAL SURGERY     • CHOLECYSTECTOMY     • SKIN BIOPSY     • TOTAL LAPAROSCOPIC HYSTERECTOMY N/A 4/19/2018    Procedure: TOTAL LAPAROSCOPIC HYSTERECTOMY Left SALPINGO OOPHERECTOMY WITH DAVINCI SI ROBOT;  Surgeon: Kevin Laurent DO;  Location:  [facility];  [facility]   • TUBAL ABDOMINAL LIGATION           Social History:     Social History     Socioeconomic History   • Marital status:      Spouse name: Not on file   • Number of children: Not on file   • Years of education: Not on file   • Highest education level: Not on file   Tobacco Use   • Smoking status: Never Smoker   • Smokeless tobacco: Never Used   Substance and Sexual Activity   • Alcohol use: No   • Drug use: No   • Sexual activity: Defer     Birth control/protection: Surgical       Family History:     Family History   Problem Relation Age of Onset   • No Known Problems Mother    • Hypertension Father    • Heart disease Father    • No Known Problems Sister    • No Known Problems Brother    • Bell's palsy Brother    • Breast cancer Neg Hx        Review of Systems:     Review of Systems   Constitutional: Negative.  Negative for activity change, appetite change, chills, diaphoresis, fatigue and unexpected weight change.   HENT: Negative for congestion, dental problem, drooling, ear discharge, ear pain, facial swelling, hearing loss, mouth sores, nosebleeds, postnasal drip, rhinorrhea, sinus pressure, sneezing, sore throat, tinnitus, trouble swallowing and voice change.    Eyes: Negative.  Negative for photophobia, pain, discharge, redness, itching and visual disturbance.   Respiratory: Negative.  Negative for apnea, cough, choking, chest tightness, shortness of breath, wheezing and stridor.    Cardiovascular: Negative.  Negative for chest pain, palpitations and leg swelling.   Gastrointestinal: Negative.  Negative for abdominal distention, abdominal pain, anal bleeding, blood in stool, constipation, diarrhea, nausea, rectal pain and vomiting.   Endocrine: Negative.  Negative for cold intolerance, heat intolerance, polydipsia, polyphagia and polyuria.   Genitourinary: Positive for dysuria, frequency, pelvic pain and urgency.   Musculoskeletal: Negative.  Negative for arthralgias, back  pain, gait problem, joint swelling, myalgias, neck pain and neck stiffness.   Skin: Negative.  Negative for color change, pallor, rash and wound.   Allergic/Immunologic: Negative.  Negative for environmental allergies, food allergies and immunocompromised state.   Neurological: Negative.  Negative for dizziness, tremors, seizures, syncope, facial asymmetry, speech difficulty, weakness, light-headedness, numbness and headaches.   Hematological: Negative.  Negative for adenopathy. Does not bruise/bleed easily.   Psychiatric/Behavioral: Negative for agitation, behavioral problems, confusion, decreased concentration, dysphoric mood, hallucinations, self-injury, sleep disturbance and suicidal ideas. The patient is not nervous/anxious and is not hyperactive.    All other systems reviewed and are negative.      Physical Exam:     Physical Exam   Constitutional: She appears well-developed and well-nourished.   HENT:   Head: Normocephalic and atraumatic.   Right Ear: External ear normal.   Left Ear: External ear normal.   Mouth/Throat: Oropharynx is clear and moist.   Eyes: Conjunctivae are normal. Pupils are equal, round, and reactive to light.   Cardiovascular: Normal rate, regular rhythm, normal heart sounds and intact distal pulses.   Pulmonary/Chest: Effort normal and breath sounds normal.   Abdominal: Soft. Bowel sounds are normal. She exhibits no distension and no mass. There is no tenderness. There is no rebound and no guarding.   Genitourinary: No vaginal discharge found.   Musculoskeletal: Normal range of motion.   Neurological: She is alert. She has normal reflexes.   Skin: Skin is warm and dry.   Psychiatric: She has a normal mood and affect. Her behavior is normal. Judgment and thought content normal.       I have reviewed the following portions of the patient's history: allergies, current medications, past family history, past medical history, past social history, past surgical history, problem list and ROS and  confirm it's accurate.    Procedure:       Assessment/Plan:   Recurrent urinary tract infections-patient has been referred and diagnosed with recurrent urinary tract infections.  We discussed the types of organisms that are found in the urinary tract indicating that the vast majority are results of the patient's own gastrointestinal crystal.  We discussed how many of the antibiotics that are utilized can actually exacerbate these infections by creating resistant organisms and there is only a very few antibiotics that are concentrated in the urine and do not affect the rectal reservoir nor cause recurrent yeast vaginitis.  We discussed the risk factors for recurrent infections being intercourse in younger patients and atrophic changes in older patients.  We discussed the symptoms that are found including pain, pressure, burning, frequency, urgency suprapubic pain and painful intercourse.  I discussed upper tract symptoms including fevers, chills, and indicated the workup would be much more aggressive if the patient were to present with recurrent infections in the face of upper tract symptomatology such as fever.  I discussed the history of vesicoureteral reflux in young patients and finally chronic renal scarring as a result of such.  I recommend concomitant probiotics with treatment with antibiotics to protect the rectal reservoir including over-the-counter yogurt preparations to jameson oral pills containing the appropriate probiotics.    Patient's Body mass index is 26.31 kg/m². BMI is above normal parameters. Recommendations include: educational material.          This document has been electronically signed by CICI DESAI MD April 1, 2019 2:40 PM

## 2019-04-02 ENCOUNTER — TELEPHONE (OUTPATIENT)
Dept: FAMILY MEDICINE CLINIC | Facility: CLINIC | Age: 44
End: 2019-04-02

## 2019-04-02 LAB — BACTERIA SPEC AEROBE CULT: NO GROWTH

## 2019-04-02 NOTE — TELEPHONE ENCOUNTER
Patient called reports she has been having trouble swallowing,had a CT scan,saw urology yesterday he reviewed the CT Scan & told her her problems were from acid reflux & to see if her primary would order her something for acid reflux?

## 2019-04-03 DIAGNOSIS — R13.10 DYSPHAGIA, UNSPECIFIED TYPE: Primary | ICD-10-CM

## 2019-04-03 PROBLEM — R31.9 URINARY TRACT INFECTION WITH HEMATURIA: Status: ACTIVE | Noted: 2019-04-03

## 2019-04-03 PROBLEM — N39.0 URINARY TRACT INFECTION WITH HEMATURIA: Status: ACTIVE | Noted: 2019-04-03

## 2019-04-03 RX ORDER — OMEPRAZOLE 40 MG/1
40 CAPSULE, DELAYED RELEASE ORAL DAILY
Qty: 30 CAPSULE | Refills: 5 | Status: SHIPPED | OUTPATIENT
Start: 2019-04-03 | End: 2019-10-02 | Stop reason: SDUPTHER

## 2019-04-03 NOTE — TELEPHONE ENCOUNTER
Sent in a trail of prilosec to her pharmacy  I think she needs a referral to GI as she may need a scope    Spoke with patient & she verbalized understanding,reports she would like a GI referral,no preference.

## 2019-04-03 NOTE — TELEPHONE ENCOUNTER
Sent in a trail of prilosec to her pharmacy  I think she needs a referral to GI as she may need a scope

## 2019-04-25 ENCOUNTER — OFFICE VISIT (OUTPATIENT)
Dept: FAMILY MEDICINE CLINIC | Facility: CLINIC | Age: 44
End: 2019-04-25

## 2019-04-25 VITALS
BODY MASS INDEX: 25.55 KG/M2 | TEMPERATURE: 99.4 F | HEART RATE: 68 BPM | WEIGHT: 159 LBS | DIASTOLIC BLOOD PRESSURE: 70 MMHG | HEIGHT: 66 IN | SYSTOLIC BLOOD PRESSURE: 102 MMHG | OXYGEN SATURATION: 99 %

## 2019-04-25 DIAGNOSIS — L30.9 DERMATITIS: Primary | ICD-10-CM

## 2019-04-25 PROCEDURE — 99213 OFFICE O/P EST LOW 20 MIN: CPT | Performed by: NURSE PRACTITIONER

## 2019-04-25 RX ORDER — KETOCONAZOLE 20 MG/G
CREAM TOPICAL EVERY 12 HOURS SCHEDULED
Qty: 60 G | Refills: 1 | Status: SHIPPED | OUTPATIENT
Start: 2019-04-25 | End: 2021-04-08

## 2019-04-25 NOTE — PROGRESS NOTES
Subjective   Betty Powers is a 43 y.o. female.     Chief Complaint: Rash    Rash   This is a recurrent problem. The current episode started in the past 7 days. The problem is unchanged. The affected locations include the neck, left lower leg and groin. The rash is characterized by redness, itchiness and burning. She was exposed to nothing. Treatments tried: eucrisa. Her past medical history is significant for eczema. (Rosacea)        Family History   Problem Relation Age of Onset   • No Known Problems Mother    • Hypertension Father    • Heart disease Father    • No Known Problems Sister    • No Known Problems Brother    • Bell's palsy Brother    • Breast cancer Neg Hx        Social History     Socioeconomic History   • Marital status:      Spouse name: Not on file   • Number of children: Not on file   • Years of education: Not on file   • Highest education level: Not on file   Tobacco Use   • Smoking status: Never Smoker   • Smokeless tobacco: Never Used   Substance and Sexual Activity   • Alcohol use: No   • Drug use: No   • Sexual activity: Defer     Birth control/protection: Surgical       Past Medical History:   Diagnosis Date   • Allergic rhinitis    • Dysmenorrhea    • Dysuria    • Headache    • History of blood clots    • Jaundice    • Ovarian tumor     X 3 PER PATIENT   • Pelvic pain    • PONV (postoperative nausea and vomiting)    • Spherocytosis (familial) (CMS/HCC)    • Spherocytosis, hereditary (CMS/HCC)    • Uterine leiomyoma        Review of Systems   Constitutional: Negative.    HENT: Negative.    Respiratory: Negative.    Cardiovascular: Negative.    Gastrointestinal: Negative.    Musculoskeletal: Negative.    Skin: Positive for rash.   Neurological: Negative.    Psychiatric/Behavioral: Negative.        Objective   Physical Exam   Constitutional: She is oriented to person, place, and time. She appears well-developed and well-nourished.   Neck: Normal range of motion. Neck supple.  "  Cardiovascular: Normal rate.   Pulmonary/Chest: Effort normal.   Neurological: She is alert and oriented to person, place, and time.   Skin: Skin is warm and dry.   Red nickel size round erythematous areas with induration to lower left leg, neck and groin   Psychiatric: She has a normal mood and affect. Her behavior is normal. Judgment and thought content normal.   Nursing note and vitals reviewed.      Procedures    Vitals: Blood pressure 102/70, pulse 68, temperature 99.4 °F (37.4 °C), temperature source Oral, height 167.6 cm (66\"), weight 72.1 kg (159 lb), last menstrual period 04/07/2018, SpO2 99 %, not currently breastfeeding.    Allergies: No Known Allergies     During this visit the following were done:  Labs Reviewed []    Labs Ordered []    Radiology Reports Reviewed []    Radiology Ordered []    PCP Records Reviewed []    Referring Provider Records Reviewed []    ER Records Reviewed []    Hospital Records Reviewed []    History Obtained From Family []    Radiology Images Reviewed []    Other Reviewed []    Records Requested []      Assessment/Plan   Betty was seen today for rash.    Diagnoses and all orders for this visit:    Dermatitis  -     ketoconazole (NIZORAL) 2 % cream; Apply  topically to the appropriate area as directed Every 12 (Twelve) Hours.               "

## 2019-06-03 ENCOUNTER — OFFICE VISIT (OUTPATIENT)
Dept: UROLOGY | Facility: CLINIC | Age: 44
End: 2019-06-03

## 2019-06-03 VITALS — BODY MASS INDEX: 25.55 KG/M2 | HEIGHT: 66 IN | WEIGHT: 159 LBS

## 2019-06-03 DIAGNOSIS — N30.01 ACUTE CYSTITIS WITH HEMATURIA: Primary | ICD-10-CM

## 2019-06-03 DIAGNOSIS — R35.0 FREQUENCY OF MICTURITION: ICD-10-CM

## 2019-06-03 PROCEDURE — 99213 OFFICE O/P EST LOW 20 MIN: CPT | Performed by: UROLOGY

## 2019-06-03 RX ORDER — NITROFURANTOIN 25; 75 MG/1; MG/1
100 CAPSULE ORAL DAILY
Qty: 56 CAPSULE | Refills: 3 | Status: SHIPPED | OUTPATIENT
Start: 2019-06-03 | End: 2019-10-02 | Stop reason: SDUPTHER

## 2019-06-03 NOTE — PROGRESS NOTES
Chief Complaint:          Chief Complaint   Patient presents with   • Blood in Urine     2 mnth follow up       HPI:   43 y.o. female referred with hematuria.  She has a history of trace stress incontinence.  She gets a urinary tract infection every month.  She started after she passed a stone.  She takes only folic acid and B12.  She has hereditary spherocytosis.  She has had a hysterectomy and cholecystectomy.  Her urine today was trace positive when she has an infection is characterized by dysuria suprapubic pain urgency.  There is no relationship to sexual intercourse.  It started after she began swimming.  She has since stopped.  She returns today she feels great meds working great no complaints problems normal bowel movements I will see her back on an as needed basis      Past Medical History:        Past Medical History:   Diagnosis Date   • Allergic rhinitis    • Dysmenorrhea    • Dysuria    • Headache    • History of blood clots    • Jaundice    • Ovarian tumor     X 3 PER PATIENT   • Pelvic pain    • PONV (postoperative nausea and vomiting)    • Spherocytosis (familial) (CMS/HCC)    • Spherocytosis, hereditary (CMS/HCC)    • Uterine leiomyoma          Current Meds:     Current Outpatient Medications   Medication Sig Dispense Refill   • aspirin 81 MG EC tablet Take 81 mg by mouth Daily.     • ferrous sulfate 325 (65 FE) MG tablet Take 325 mg by mouth Daily With Breakfast.     • folic acid (FOLVITE) 1 MG tablet Take 1 mg by mouth Daily.     • ketoconazole (NIZORAL) 2 % cream Apply  topically to the appropriate area as directed Every 12 (Twelve) Hours. 60 g 1   • nitrofurantoin, macrocrystal-monohydrate, (MACROBID) 100 MG capsule Take 1 capsule by mouth Daily. 56 capsule 3   • omeprazole (priLOSEC) 40 MG capsule Take 1 capsule by mouth Daily. 30 capsule 5   • Probiotic Product (PROBIOTIC DAILY PO) Take 1 tablet by mouth Daily.     • vitamin B-12 (CYANOCOBALAMIN) 1000 MCG tablet Take 1,000 mcg by mouth Daily.        No current facility-administered medications for this visit.         Allergies:      No Known Allergies     Past Surgical History:     Past Surgical History:   Procedure Laterality Date   • ABDOMINAL SURGERY     • CHOLECYSTECTOMY     • SKIN BIOPSY     • TOTAL LAPAROSCOPIC HYSTERECTOMY N/A 4/19/2018    Procedure: TOTAL LAPAROSCOPIC HYSTERECTOMY Left SALPINGO OOPHERECTOMY WITH DAVINCI SI ROBOT;  Surgeon: Kevin Laurent DO;  Location: [facility];  Service: DaVinci   • TUBAL ABDOMINAL LIGATION           Social History:     Social History     Socioeconomic History   • Marital status:      Spouse name: Not on file   • Number of children: Not on file   • Years of education: Not on file   • Highest education level: Not on file   Tobacco Use   • Smoking status: Never Smoker   • Smokeless tobacco: Never Used   Substance and Sexual Activity   • Alcohol use: No   • Drug use: No   • Sexual activity: Defer     Birth control/protection: Surgical       Family History:     Family History   Problem Relation Age of Onset   • No Known Problems Mother    • Hypertension Father    • Heart disease Father    • No Known Problems Sister    • No Known Problems Brother    • Bell's palsy Brother    • Breast cancer Neg Hx        Review of Systems:     Review of Systems   Constitutional: Negative.  Negative for activity change, appetite change, chills, diaphoresis, fatigue and unexpected weight change.   HENT: Negative for congestion, dental problem, drooling, ear discharge, ear pain, facial swelling, hearing loss, mouth sores, nosebleeds, postnasal drip, rhinorrhea, sinus pressure, sneezing, sore throat, tinnitus, trouble swallowing and voice change.    Eyes: Negative.  Negative for photophobia, pain, discharge, redness, itching and visual disturbance.   Respiratory: Negative.  Negative for apnea, cough, choking, chest tightness, shortness of breath, wheezing and stridor.    Cardiovascular: Negative.  Negative for chest pain,  palpitations and leg swelling.   Gastrointestinal: Negative.  Negative for abdominal distention, abdominal pain, anal bleeding, blood in stool, constipation, diarrhea, nausea, rectal pain and vomiting.   Endocrine: Negative.  Negative for cold intolerance, heat intolerance, polydipsia, polyphagia and polyuria.   Musculoskeletal: Negative.  Negative for arthralgias, back pain, gait problem, joint swelling, myalgias, neck pain and neck stiffness.   Skin: Negative.  Negative for color change, pallor, rash and wound.   Allergic/Immunologic: Negative.  Negative for environmental allergies, food allergies and immunocompromised state.   Neurological: Negative.  Negative for dizziness, tremors, seizures, syncope, facial asymmetry, speech difficulty, weakness, light-headedness, numbness and headaches.   Hematological: Negative.  Negative for adenopathy. Does not bruise/bleed easily.   Psychiatric/Behavioral: Negative for agitation, behavioral problems, confusion, decreased concentration, dysphoric mood, hallucinations, self-injury, sleep disturbance and suicidal ideas. The patient is not nervous/anxious and is not hyperactive.    All other systems reviewed and are negative.      Physical Exam:     Physical Exam   Constitutional: She appears well-developed and well-nourished.   HENT:   Head: Normocephalic and atraumatic.   Right Ear: External ear normal.   Left Ear: External ear normal.   Mouth/Throat: Oropharynx is clear and moist.   Eyes: Conjunctivae are normal. Pupils are equal, round, and reactive to light.   Cardiovascular: Normal rate, regular rhythm, normal heart sounds and intact distal pulses.   Pulmonary/Chest: Effort normal and breath sounds normal.   Abdominal: Soft. Bowel sounds are normal. She exhibits no distension and no mass. There is no tenderness. There is no rebound and no guarding.   Genitourinary: No vaginal discharge found.   Musculoskeletal: Normal range of motion.   Neurological: She is alert. She  has normal reflexes.   Skin: Skin is warm and dry.   Psychiatric: She has a normal mood and affect. Her behavior is normal. Judgment and thought content normal.       I have reviewed the following portions of the patient's history: allergies, current medications, past family history, past medical history, past social history, past surgical history, problem list and ROS and confirm it's accurate.      Procedure:       Assessment/Plan:   Recurrent urinary tract infections-patient has been referred and diagnosed with recurrent urinary tract infections.  We discussed the types of organisms that are found in the urinary tract indicating that the vast majority are results of the patient's own gastrointestinal crystal.  We discussed how many of the antibiotics that are utilized can actually exacerbate these infections by creating resistant organisms and there is only a very few antibiotics that are concentrated in the urine and do not affect the rectal reservoir nor cause recurrent yeast vaginitis.  We discussed the risk factors for recurrent infections being intercourse in younger patients and atrophic changes in older patients.  We discussed the symptoms that are found including pain, pressure, burning, frequency, urgency suprapubic pain and painful intercourse.  I discussed upper tract symptoms including fevers, chills, and indicated the workup would be much more aggressive if the patient were to present with recurrent infections in the face of upper tract symptomatology such as fever.  I discussed the history of vesicoureteral reflux in young patients and finally chronic renal scarring as a result of such.  I recommend concomitant probiotics with treatment with antibiotics to protect the rectal reservoir including over-the-counter yogurt preparations to jameson oral pills containing the appropriate probiotics.  Start weaning herself off Macrodantin and just use it at the time of intercourse            Patient's Body mass  index is 25.66 kg/m². BMI is above normal parameters. Recommendations include: educational material.              This document has been electronically signed by CICI DESAI MD Laurie 3, 2019 1:53 PM

## 2019-08-19 ENCOUNTER — TELEPHONE (OUTPATIENT)
Dept: FAMILY MEDICINE CLINIC | Facility: CLINIC | Age: 44
End: 2019-08-19

## 2019-08-23 PROCEDURE — 87086 URINE CULTURE/COLONY COUNT: CPT | Performed by: UROLOGY

## 2019-08-23 PROCEDURE — 87076 CULTURE ANAEROBE IDENT EACH: CPT | Performed by: UROLOGY

## 2019-08-26 ENCOUNTER — TELEPHONE (OUTPATIENT)
Dept: UROLOGY | Facility: CLINIC | Age: 44
End: 2019-08-26

## 2019-10-02 DIAGNOSIS — R35.0 FREQUENCY OF MICTURITION: ICD-10-CM

## 2019-10-02 RX ORDER — OMEPRAZOLE 40 MG/1
CAPSULE, DELAYED RELEASE ORAL
Qty: 30 CAPSULE | Refills: 0 | Status: SHIPPED | OUTPATIENT
Start: 2019-10-02 | End: 2019-12-02 | Stop reason: SDUPTHER

## 2019-10-02 RX ORDER — NITROFURANTOIN 25; 75 MG/1; MG/1
CAPSULE ORAL
Qty: 56 CAPSULE | Refills: 3 | Status: SHIPPED | OUTPATIENT
Start: 2019-10-02 | End: 2019-11-10 | Stop reason: SDUPTHER

## 2019-11-10 DIAGNOSIS — R35.0 FREQUENCY OF MICTURITION: ICD-10-CM

## 2019-11-11 RX ORDER — NITROFURANTOIN 25; 75 MG/1; MG/1
CAPSULE ORAL
Qty: 56 CAPSULE | Refills: 3 | Status: SHIPPED | OUTPATIENT
Start: 2019-11-11 | End: 2020-01-06

## 2019-12-02 ENCOUNTER — TELEPHONE (OUTPATIENT)
Dept: FAMILY MEDICINE CLINIC | Facility: CLINIC | Age: 44
End: 2019-12-02

## 2019-12-02 RX ORDER — OMEPRAZOLE 40 MG/1
40 CAPSULE, DELAYED RELEASE ORAL DAILY
Qty: 30 CAPSULE | Refills: 1 | Status: SHIPPED | OUTPATIENT
Start: 2019-12-02 | End: 2020-01-06 | Stop reason: SDUPTHER

## 2019-12-03 RX ORDER — OMEPRAZOLE 40 MG/1
CAPSULE, DELAYED RELEASE ORAL
Qty: 30 CAPSULE | Refills: 0 | OUTPATIENT
Start: 2019-12-03

## 2019-12-31 RX ORDER — OMEPRAZOLE 40 MG/1
CAPSULE, DELAYED RELEASE ORAL
Qty: 30 CAPSULE | Refills: 1 | OUTPATIENT
Start: 2019-12-31

## 2020-01-02 ENCOUNTER — APPOINTMENT (OUTPATIENT)
Dept: CT IMAGING | Facility: HOSPITAL | Age: 45
End: 2020-01-02

## 2020-01-02 ENCOUNTER — HOSPITAL ENCOUNTER (EMERGENCY)
Facility: HOSPITAL | Age: 45
Discharge: HOME | End: 2020-01-02
Attending: FAMILY MEDICINE | Admitting: FAMILY MEDICINE

## 2020-01-02 ENCOUNTER — APPOINTMENT (OUTPATIENT)
Dept: GENERAL RADIOLOGY | Facility: HOSPITAL | Age: 45
End: 2020-01-02

## 2020-01-02 VITALS
RESPIRATION RATE: 16 BRPM | TEMPERATURE: 98.4 F | HEIGHT: 66 IN | SYSTOLIC BLOOD PRESSURE: 111 MMHG | DIASTOLIC BLOOD PRESSURE: 61 MMHG | WEIGHT: 150 LBS | HEART RATE: 97 BPM | BODY MASS INDEX: 24.11 KG/M2 | OXYGEN SATURATION: 99 %

## 2020-01-02 DIAGNOSIS — K52.9 GASTROENTERITIS: Primary | ICD-10-CM

## 2020-01-02 LAB
ALBUMIN SERPL-MCNC: 4.66 G/DL (ref 3.5–5.2)
ALBUMIN/GLOB SERPL: 1.5 G/DL
ALP SERPL-CCNC: 73 U/L (ref 39–117)
ALT SERPL W P-5'-P-CCNC: 15 U/L (ref 1–33)
ANION GAP SERPL CALCULATED.3IONS-SCNC: 14.6 MMOL/L (ref 5–15)
AST SERPL-CCNC: 22 U/L (ref 1–32)
BACTERIA UR QL AUTO: ABNORMAL /HPF
BASOPHILS # BLD AUTO: 0.01 10*3/MM3 (ref 0–0.2)
BASOPHILS NFR BLD AUTO: 0.1 % (ref 0–1.5)
BILIRUB SERPL-MCNC: 3.6 MG/DL (ref 0.2–1.2)
BILIRUB UR QL STRIP: ABNORMAL
BUN BLD-MCNC: 11 MG/DL (ref 6–20)
BUN/CREAT SERPL: 14.3 (ref 7–25)
CALCIUM SPEC-SCNC: 9 MG/DL (ref 8.6–10.5)
CHLORIDE SERPL-SCNC: 104 MMOL/L (ref 98–107)
CK SERPL-CCNC: 102 U/L (ref 20–180)
CLARITY UR: CLEAR
CO2 SERPL-SCNC: 22.4 MMOL/L (ref 22–29)
COLOR UR: ABNORMAL
CREAT BLD-MCNC: 0.77 MG/DL (ref 0.57–1)
CRP SERPL-MCNC: 3.98 MG/DL (ref 0–0.5)
DEPRECATED RDW RBC AUTO: 50.3 FL (ref 37–54)
EOSINOPHIL # BLD AUTO: 0.15 10*3/MM3 (ref 0–0.4)
EOSINOPHIL NFR BLD AUTO: 2 % (ref 0.3–6.2)
ERYTHROCYTE [DISTWIDTH] IN BLOOD BY AUTOMATED COUNT: 15 % (ref 12.3–15.4)
GFR SERPL CREATININE-BSD FRML MDRD: 81 ML/MIN/1.73
GLOBULIN UR ELPH-MCNC: 3 GM/DL
GLUCOSE BLD-MCNC: 127 MG/DL (ref 65–99)
GLUCOSE UR STRIP-MCNC: NEGATIVE MG/DL
HCT VFR BLD AUTO: 36.6 % (ref 34–46.6)
HGB BLD-MCNC: 13 G/DL (ref 12–15.9)
HGB UR QL STRIP.AUTO: ABNORMAL
HOLD SPECIMEN: NORMAL
HOLD SPECIMEN: NORMAL
HYALINE CASTS UR QL AUTO: ABNORMAL /LPF
IMM GRANULOCYTES # BLD AUTO: 0.03 10*3/MM3 (ref 0–0.05)
IMM GRANULOCYTES NFR BLD AUTO: 0.4 % (ref 0–0.5)
KETONES UR QL STRIP: NEGATIVE
LEUKOCYTE ESTERASE UR QL STRIP.AUTO: ABNORMAL
LIPASE SERPL-CCNC: 18 U/L (ref 13–60)
LYMPHOCYTES # BLD AUTO: 0.22 10*3/MM3 (ref 0.7–3.1)
LYMPHOCYTES NFR BLD AUTO: 2.9 % (ref 19.6–45.3)
MAGNESIUM SERPL-MCNC: 1.9 MG/DL (ref 1.6–2.6)
MCH RBC QN AUTO: 32.4 PG (ref 26.6–33)
MCHC RBC AUTO-ENTMCNC: 35.5 G/DL (ref 31.5–35.7)
MCV RBC AUTO: 91.3 FL (ref 79–97)
MONOCYTES # BLD AUTO: 0.33 10*3/MM3 (ref 0.1–0.9)
MONOCYTES NFR BLD AUTO: 4.3 % (ref 5–12)
NEUTROPHILS # BLD AUTO: 6.87 10*3/MM3 (ref 1.7–7)
NEUTROPHILS NFR BLD AUTO: 90.3 % (ref 42.7–76)
NITRITE UR QL STRIP: NEGATIVE
NRBC BLD AUTO-RTO: 0 /100 WBC (ref 0–0.2)
PH UR STRIP.AUTO: 5.5 [PH] (ref 5–8)
PLATELET # BLD AUTO: 174 10*3/MM3 (ref 140–450)
PMV BLD AUTO: 10.6 FL (ref 6–12)
POTASSIUM BLD-SCNC: 3.2 MMOL/L (ref 3.5–5.2)
PROT SERPL-MCNC: 7.7 G/DL (ref 6–8.5)
PROT UR QL STRIP: ABNORMAL
RBC # BLD AUTO: 4.01 10*6/MM3 (ref 3.77–5.28)
RBC # UR: ABNORMAL /HPF
REF LAB TEST METHOD: ABNORMAL
SODIUM BLD-SCNC: 141 MMOL/L (ref 136–145)
SP GR UR STRIP: 1.02 (ref 1–1.03)
SQUAMOUS #/AREA URNS HPF: ABNORMAL /HPF
TROPONIN T SERPL-MCNC: <0.01 NG/ML (ref 0–0.03)
UROBILINOGEN UR QL STRIP: ABNORMAL
WBC NRBC COR # BLD: 7.61 10*3/MM3 (ref 3.4–10.8)
WBC UR QL AUTO: ABNORMAL /HPF
WHOLE BLOOD HOLD SPECIMEN: NORMAL
WHOLE BLOOD HOLD SPECIMEN: NORMAL

## 2020-01-02 PROCEDURE — 83735 ASSAY OF MAGNESIUM: CPT | Performed by: PHYSICIAN ASSISTANT

## 2020-01-02 PROCEDURE — 74177 CT ABD & PELVIS W/CONTRAST: CPT

## 2020-01-02 PROCEDURE — 93005 ELECTROCARDIOGRAM TRACING: CPT | Performed by: EMERGENCY MEDICINE

## 2020-01-02 PROCEDURE — 96375 TX/PRO/DX INJ NEW DRUG ADDON: CPT

## 2020-01-02 PROCEDURE — 86140 C-REACTIVE PROTEIN: CPT | Performed by: PHYSICIAN ASSISTANT

## 2020-01-02 PROCEDURE — 93010 ELECTROCARDIOGRAM REPORT: CPT | Performed by: INTERNAL MEDICINE

## 2020-01-02 PROCEDURE — 84484 ASSAY OF TROPONIN QUANT: CPT | Performed by: PHYSICIAN ASSISTANT

## 2020-01-02 PROCEDURE — 25010000002 ONDANSETRON PER 1 MG: Performed by: PHYSICIAN ASSISTANT

## 2020-01-02 PROCEDURE — 74177 CT ABD & PELVIS W/CONTRAST: CPT | Performed by: RADIOLOGY

## 2020-01-02 PROCEDURE — 82550 ASSAY OF CK (CPK): CPT | Performed by: PHYSICIAN ASSISTANT

## 2020-01-02 PROCEDURE — 96374 THER/PROPH/DIAG INJ IV PUSH: CPT

## 2020-01-02 PROCEDURE — 25010000002 KETOROLAC TROMETHAMINE PER 15 MG: Performed by: PHYSICIAN ASSISTANT

## 2020-01-02 PROCEDURE — 0 IOVERSOL 68 % SOLUTION: Performed by: FAMILY MEDICINE

## 2020-01-02 PROCEDURE — 93005 ELECTROCARDIOGRAM TRACING: CPT | Performed by: FAMILY MEDICINE

## 2020-01-02 PROCEDURE — 85025 COMPLETE CBC W/AUTO DIFF WBC: CPT | Performed by: PHYSICIAN ASSISTANT

## 2020-01-02 PROCEDURE — 80053 COMPREHEN METABOLIC PANEL: CPT | Performed by: PHYSICIAN ASSISTANT

## 2020-01-02 PROCEDURE — 71045 X-RAY EXAM CHEST 1 VIEW: CPT | Performed by: RADIOLOGY

## 2020-01-02 PROCEDURE — 25010000002 PROCHLORPERAZINE 10 MG/2ML SOLUTION: Performed by: PHYSICIAN ASSISTANT

## 2020-01-02 PROCEDURE — 83690 ASSAY OF LIPASE: CPT | Performed by: PHYSICIAN ASSISTANT

## 2020-01-02 PROCEDURE — 71045 X-RAY EXAM CHEST 1 VIEW: CPT

## 2020-01-02 PROCEDURE — 81001 URINALYSIS AUTO W/SCOPE: CPT | Performed by: PHYSICIAN ASSISTANT

## 2020-01-02 PROCEDURE — 99284 EMERGENCY DEPT VISIT MOD MDM: CPT

## 2020-01-02 RX ORDER — ONDANSETRON 2 MG/ML
4 INJECTION INTRAMUSCULAR; INTRAVENOUS ONCE
Status: COMPLETED | OUTPATIENT
Start: 2020-01-02 | End: 2020-01-02

## 2020-01-02 RX ORDER — SODIUM CHLORIDE 0.9 % (FLUSH) 0.9 %
10 SYRINGE (ML) INJECTION AS NEEDED
Status: DISCONTINUED | OUTPATIENT
Start: 2020-01-02 | End: 2020-01-02 | Stop reason: HOSPADM

## 2020-01-02 RX ORDER — PROCHLORPERAZINE EDISYLATE 5 MG/ML
10 INJECTION INTRAMUSCULAR; INTRAVENOUS ONCE
Status: COMPLETED | OUTPATIENT
Start: 2020-01-02 | End: 2020-01-02

## 2020-01-02 RX ORDER — KETOROLAC TROMETHAMINE 30 MG/ML
30 INJECTION, SOLUTION INTRAMUSCULAR; INTRAVENOUS ONCE
Status: COMPLETED | OUTPATIENT
Start: 2020-01-02 | End: 2020-01-02

## 2020-01-02 RX ORDER — PROCHLORPERAZINE MALEATE 10 MG
10 TABLET ORAL EVERY 6 HOURS PRN
Qty: 20 TABLET | Refills: 0 | Status: SHIPPED | OUTPATIENT
Start: 2020-01-02 | End: 2020-05-20

## 2020-01-02 RX ADMIN — SODIUM CHLORIDE 1000 ML: 9 INJECTION, SOLUTION INTRAVENOUS at 14:49

## 2020-01-02 RX ADMIN — ONDANSETRON 4 MG: 2 INJECTION INTRAMUSCULAR; INTRAVENOUS at 14:49

## 2020-01-02 RX ADMIN — PROCHLORPERAZINE EDISYLATE 10 MG: 5 INJECTION INTRAMUSCULAR; INTRAVENOUS at 16:54

## 2020-01-02 RX ADMIN — KETOROLAC TROMETHAMINE 30 MG: 30 INJECTION, SOLUTION INTRAMUSCULAR at 16:54

## 2020-01-02 RX ADMIN — IOVERSOL 100 ML: 678 INJECTION INTRA-ARTERIAL; INTRAVENOUS at 17:07

## 2020-01-02 NOTE — ED PROVIDER NOTES
Subjective   44-year-old female presents to the ER complaining of shortness of breath, chest pain, vomiting, and diarrhea.  Past medical history is positive for dermatitis, in which she has been treated with the Benadryl.  Patient states she has been weaning herself off her dermatitis medication over the last few days.  Patient states she has vomited 27 times over the last night.  Patient is complaining of some tightness in her chest as well as left lower quadrant abdominal pain.          Review of Systems   Constitutional: Negative.  Negative for fever.   HENT: Negative.    Respiratory: Positive for shortness of breath.    Cardiovascular: Positive for chest pain.   Gastrointestinal: Positive for abdominal pain, diarrhea and vomiting.   Endocrine: Negative.    Genitourinary: Negative.  Negative for dysuria.   Skin: Negative.    Neurological: Negative.    Psychiatric/Behavioral: Negative.    All other systems reviewed and are negative.      Past Medical History:   Diagnosis Date   • Allergic rhinitis    • Dysmenorrhea    • Dysuria    • Headache    • History of blood clots    • Jaundice    • Ovarian tumor     X 3 PER PATIENT   • Pelvic pain    • PONV (postoperative nausea and vomiting)    • Spherocytosis (familial) (CMS/HCC)    • Spherocytosis, hereditary (CMS/HCC)    • Uterine leiomyoma        No Known Allergies    Past Surgical History:   Procedure Laterality Date   • ABDOMINAL SURGERY     • CHOLECYSTECTOMY     • SKIN BIOPSY     • TOTAL LAPAROSCOPIC HYSTERECTOMY N/A 4/19/2018    Procedure: TOTAL LAPAROSCOPIC HYSTERECTOMY Left SALPINGO OOPHERECTOMY WITH DAVINCI SI ROBOT;  Surgeon: Kevin Laurent DO;  Location: [facility];  Service: DaVinci   • TUBAL ABDOMINAL LIGATION         Family History   Problem Relation Age of Onset   • No Known Problems Mother    • Hypertension Father    • Heart disease Father    • No Known Problems Sister    • No Known Problems Brother    • Bell's palsy Brother    • Breast cancer Neg  Hx        Social History     Socioeconomic History   • Marital status:      Spouse name: Not on file   • Number of children: Not on file   • Years of education: Not on file   • Highest education level: Not on file   Tobacco Use   • Smoking status: Never Smoker   • Smokeless tobacco: Never Used   Substance and Sexual Activity   • Alcohol use: No   • Drug use: No   • Sexual activity: Defer     Birth control/protection: Surgical           Objective   Physical Exam   Constitutional: She is oriented to person, place, and time. She appears well-developed and well-nourished. No distress.   HENT:   Head: Normocephalic and atraumatic.   Right Ear: External ear normal.   Left Ear: External ear normal.   Nose: Nose normal.   Eyes: Conjunctivae are normal.   Neck: Normal range of motion. Neck supple. No JVD present. No tracheal deviation present.   Cardiovascular: Normal rate, regular rhythm and normal heart sounds.   No murmur heard.  Pulmonary/Chest: Effort normal and breath sounds normal. No respiratory distress. She has no wheezes.   Abdominal: Soft. Bowel sounds are normal. There is no tenderness.   Tenderness within the left lower quadrant of the abdomen.   Musculoskeletal: Normal range of motion. She exhibits no edema or deformity.   Neurological: She is alert and oriented to person, place, and time. No cranial nerve deficit.   Skin: Skin is warm and dry. No rash noted. She is not diaphoretic. No erythema. No pallor.   Psychiatric: She has a normal mood and affect. Her behavior is normal. Thought content normal.   Nursing note and vitals reviewed.      Procedures           ED Course  ED Course as of Jan 02 1751   Thu Jan 02, 2020   1526 CXR rad interpreted:  No radiographic evidence of acute cardiac or pulmonary  disease.    [RB]   1720 CT rad interpreted:  Fluid within the small bowel and within the colon suggestive possible  enteritis and diarrheal illness.       [RB]      ED Course User Index  [RB] [facility]                                               [facility]  Number of Diagnoses or Management Options  Gastroenteritis: new and requires workup     Amount and/or Complexity of Data Reviewed  Clinical lab tests: ordered and reviewed  Tests in the radiology section of CPT®: ordered and reviewed    Risk of Complications, Morbidity, and/or Mortality  Presenting problems: moderate  Diagnostic procedures: moderate  Management options: low    Patient Progress  Patient progress: stable      Final diagnoses:   Gastroenteritis            Nasir Madrid PA  01/02/20 1752

## 2020-01-06 ENCOUNTER — OFFICE VISIT (OUTPATIENT)
Dept: FAMILY MEDICINE CLINIC | Facility: CLINIC | Age: 45
End: 2020-01-06

## 2020-01-06 ENCOUNTER — LAB (OUTPATIENT)
Dept: LAB | Facility: HOSPITAL | Age: 45
End: 2020-01-06

## 2020-01-06 VITALS
HEIGHT: 66 IN | WEIGHT: 167.4 LBS | SYSTOLIC BLOOD PRESSURE: 125 MMHG | OXYGEN SATURATION: 97 % | TEMPERATURE: 98.6 F | DIASTOLIC BLOOD PRESSURE: 80 MMHG | HEART RATE: 76 BPM | BODY MASS INDEX: 26.9 KG/M2

## 2020-01-06 DIAGNOSIS — R11.2 NAUSEA VOMITING AND DIARRHEA: ICD-10-CM

## 2020-01-06 DIAGNOSIS — R10.84 GENERALIZED ABDOMINAL PAIN: ICD-10-CM

## 2020-01-06 DIAGNOSIS — R19.7 NAUSEA VOMITING AND DIARRHEA: ICD-10-CM

## 2020-01-06 DIAGNOSIS — R10.84 GENERALIZED ABDOMINAL PAIN: Primary | ICD-10-CM

## 2020-01-06 DIAGNOSIS — R17 JAUNDICE: ICD-10-CM

## 2020-01-06 DIAGNOSIS — D58.0 HEREDITARY SPHEROCYTOSIS (HCC): ICD-10-CM

## 2020-01-06 LAB
ALBUMIN SERPL-MCNC: 4.57 G/DL (ref 3.5–5.2)
ALBUMIN/GLOB SERPL: 1.8 G/DL
ALP SERPL-CCNC: 75 U/L (ref 39–117)
ALT SERPL W P-5'-P-CCNC: 24 U/L (ref 1–33)
ANION GAP SERPL CALCULATED.3IONS-SCNC: 14.1 MMOL/L (ref 5–15)
AST SERPL-CCNC: 45 U/L (ref 1–32)
BASOPHILS # BLD AUTO: 0.02 10*3/MM3 (ref 0–0.2)
BASOPHILS NFR BLD AUTO: 0.3 % (ref 0–1.5)
BILIRUB SERPL-MCNC: 8.6 MG/DL (ref 0.2–1.2)
BUN BLD-MCNC: 4 MG/DL (ref 6–20)
BUN/CREAT SERPL: 6.2 (ref 7–25)
CALCIUM SPEC-SCNC: 9.4 MG/DL (ref 8.6–10.5)
CHLORIDE SERPL-SCNC: 101 MMOL/L (ref 98–107)
CO2 SERPL-SCNC: 24.9 MMOL/L (ref 22–29)
CREAT BLD-MCNC: 0.65 MG/DL (ref 0.57–1)
DEPRECATED RDW RBC AUTO: 48.6 FL (ref 37–54)
EOSINOPHIL # BLD AUTO: 0.11 10*3/MM3 (ref 0–0.4)
EOSINOPHIL NFR BLD AUTO: 1.5 % (ref 0.3–6.2)
ERYTHROCYTE [DISTWIDTH] IN BLOOD BY AUTOMATED COUNT: 15.3 % (ref 12.3–15.4)
ERYTHROCYTE [SEDIMENTATION RATE] IN BLOOD: 9 MM/HR (ref 0–20)
GFR SERPL CREATININE-BSD FRML MDRD: 99 ML/MIN/1.73
GLOBULIN UR ELPH-MCNC: 2.5 GM/DL
GLUCOSE BLD-MCNC: 103 MG/DL (ref 65–99)
HCT VFR BLD AUTO: 31.6 % (ref 34–46.6)
HGB BLD-MCNC: 11.3 G/DL (ref 12–15.9)
IMM GRANULOCYTES # BLD AUTO: 0.06 10*3/MM3 (ref 0–0.05)
IMM GRANULOCYTES NFR BLD AUTO: 0.8 % (ref 0–0.5)
LYMPHOCYTES # BLD AUTO: 1.74 10*3/MM3 (ref 0.7–3.1)
LYMPHOCYTES NFR BLD AUTO: 24.5 % (ref 19.6–45.3)
MCH RBC QN AUTO: 32.2 PG (ref 26.6–33)
MCHC RBC AUTO-ENTMCNC: 35.8 G/DL (ref 31.5–35.7)
MCV RBC AUTO: 90 FL (ref 79–97)
MONOCYTES # BLD AUTO: 0.53 10*3/MM3 (ref 0.1–0.9)
MONOCYTES NFR BLD AUTO: 7.5 % (ref 5–12)
NEUTROPHILS # BLD AUTO: 4.65 10*3/MM3 (ref 1.7–7)
NEUTROPHILS NFR BLD AUTO: 65.4 % (ref 42.7–76)
NRBC BLD AUTO-RTO: 0 /100 WBC (ref 0–0.2)
PLATELET # BLD AUTO: 234 10*3/MM3 (ref 140–450)
PMV BLD AUTO: 10 FL (ref 6–12)
POTASSIUM BLD-SCNC: 3.1 MMOL/L (ref 3.5–5.2)
PROT SERPL-MCNC: 7.1 G/DL (ref 6–8.5)
RBC # BLD AUTO: 3.51 10*6/MM3 (ref 3.77–5.28)
SODIUM BLD-SCNC: 140 MMOL/L (ref 136–145)
WBC NRBC COR # BLD: 7.11 10*3/MM3 (ref 3.4–10.8)

## 2020-01-06 PROCEDURE — 85652 RBC SED RATE AUTOMATED: CPT

## 2020-01-06 PROCEDURE — 85025 COMPLETE CBC W/AUTO DIFF WBC: CPT

## 2020-01-06 PROCEDURE — 99213 OFFICE O/P EST LOW 20 MIN: CPT | Performed by: NURSE PRACTITIONER

## 2020-01-06 PROCEDURE — 36415 COLL VENOUS BLD VENIPUNCTURE: CPT

## 2020-01-06 PROCEDURE — 80053 COMPREHEN METABOLIC PANEL: CPT

## 2020-01-06 RX ORDER — OMEPRAZOLE 40 MG/1
CAPSULE, DELAYED RELEASE ORAL
Qty: 30 CAPSULE | Refills: 1 | OUTPATIENT
Start: 2020-01-06

## 2020-01-06 RX ORDER — OMEPRAZOLE 40 MG/1
40 CAPSULE, DELAYED RELEASE ORAL DAILY
Qty: 30 CAPSULE | Refills: 3 | Status: SHIPPED | OUTPATIENT
Start: 2020-01-06 | End: 2020-05-15

## 2020-01-07 ENCOUNTER — TELEPHONE (OUTPATIENT)
Dept: FAMILY MEDICINE CLINIC | Facility: CLINIC | Age: 45
End: 2020-01-07

## 2020-01-07 DIAGNOSIS — E87.6 HYPOKALEMIA: ICD-10-CM

## 2020-01-07 DIAGNOSIS — R17 JAUNDICE: ICD-10-CM

## 2020-01-07 DIAGNOSIS — R10.84 GENERALIZED ABDOMINAL PAIN: Primary | ICD-10-CM

## 2020-01-07 RX ORDER — POTASSIUM CHLORIDE 20 MEQ/1
20 TABLET, EXTENDED RELEASE ORAL DAILY
Qty: 3 TABLET | Refills: 0 | Status: SHIPPED | OUTPATIENT
Start: 2020-01-07 | End: 2020-05-20

## 2020-01-07 NOTE — TELEPHONE ENCOUNTER
----- Message from OCTAVIA Roach sent at 1/7/2020 12:41 PM EST -----  Let patient know her labs does show elevation of her bilirubin 8.6 in comparison to 3.6 5 days ago.  This is why her skin is discolored.    Her potassium I slow I would like to give her some supplement for the next 3 days  Will need repeat labs on Thursday  Needs to continue to rest and increase fluids a tolerated    Patient notified & verbalized understanding.

## 2020-01-07 NOTE — PROGRESS NOTES
"Modesta Powers is a 44 y.o. female.   Chief Compliant: The patient presents with Nausea (reaction from coming off medication?); Diarrhea ( ); and Follow-up (recent ER visit)    History of Present Illness  Returns today follow up after recent Ed visit for N/V/D.  Patient states that she has been on hydroxyzine from dermatology tid for nearly a year and each time her prescription runs out she gets violently ill with N/V/D.  Over the holiday she ran out and on day 2 became ill vomiting up 27 times before going to the ED.  She states she will never take this medication again.  Complains of abd pain generalized gradually improving but still unable to eat without several episodes of diarrhea.  Denies any fever or chills.        The following portions of the patient's history were reviewed and updated as appropriate: allergies, current medications, past family history, past medical history, past social history, past surgical history and problem list.      Review of Systems   Constitutional: Positive for fatigue. Negative for chills and fever.   HENT: Negative.    Respiratory: Negative.    Cardiovascular: Negative.    Gastrointestinal: Positive for abdominal pain, diarrhea, nausea and vomiting.   All other systems reviewed and are negative.      Procedures    Vitals: Blood pressure 125/80, pulse 76, temperature 98.6 °F (37 °C), temperature source Oral, height 167.6 cm (66\"), weight 75.9 kg (167 lb 6.4 oz), last menstrual period 04/07/2018, SpO2 97 %, not currently breastfeeding.     Allergies: No Known Allergies       Objective   Physical Exam   Constitutional: She is oriented to person, place, and time. She appears well-developed and well-nourished. No distress.   Eyes: Right eye exhibits no discharge. Left eye exhibits no discharge.   Sclera juandice   Neck: Neck supple.   Cardiovascular: Normal rate, regular rhythm and normal heart sounds.   No murmur heard.  Pulmonary/Chest: Effort normal and breath sounds " normal.   Abdominal: Soft. Bowel sounds are normal. She exhibits no distension. There is tenderness (LLQ).   Neurological: She is alert and oriented to person, place, and time.   Skin: Skin is warm and dry. She is not diaphoretic.   juandice   Psychiatric: She has a normal mood and affect. Her behavior is normal.   Nursing note and vitals reviewed.      During this visit the following were done:  Labs Reviewed [x]    Labs Ordered [x]    Radiology Reports Reviewed []    Radiology Ordered []    PCP Records Reviewed []    Referring Provider Records Reviewed []    ER Records Reviewed [x]    Hospital Records Reviewed []    History Obtained From Family []    Radiology Images Reviewed []    Other Reviewed []    Records Requested []      Assessment/Plan   Discussed with patient impression and plan, patient verbalizes understanding  Reassurance to patient   Increase fluids     Betty was seen today for nausea, diarrhea and follow-up.    Diagnoses and all orders for this visit:    Generalized abdominal pain  -     CBC Auto Differential; Future  -     Comprehensive Metabolic Panel; Future  -     Sedimentation Rate; Future    Jaundice  -     CBC Auto Differential; Future  -     Comprehensive Metabolic Panel; Future  -     Sedimentation Rate; Future    Hereditary spherocytosis (CMS/HCC)  -     CBC Auto Differential; Future  -     Comprehensive Metabolic Panel; Future  -     Sedimentation Rate; Future    Nausea vomiting and diarrhea  -     CBC Auto Differential; Future  -     Comprehensive Metabolic Panel; Future  -     Sedimentation Rate; Future    Other orders  -     omeprazole (priLOSEC) 40 MG capsule; Take 1 capsule by mouth Daily.

## 2020-03-09 ENCOUNTER — LAB (OUTPATIENT)
Dept: FAMILY MEDICINE CLINIC | Facility: CLINIC | Age: 45
End: 2020-03-09

## 2020-03-09 ENCOUNTER — OFFICE VISIT (OUTPATIENT)
Dept: UROLOGY | Facility: CLINIC | Age: 45
End: 2020-03-09

## 2020-03-09 VITALS — HEIGHT: 66 IN | BODY MASS INDEX: 27.45 KG/M2 | WEIGHT: 170.8 LBS

## 2020-03-09 DIAGNOSIS — R35.0 FREQUENCY OF MICTURITION: ICD-10-CM

## 2020-03-09 DIAGNOSIS — R17 JAUNDICE: Primary | ICD-10-CM

## 2020-03-09 DIAGNOSIS — N39.0 URINARY TRACT INFECTION WITHOUT HEMATURIA, SITE UNSPECIFIED: Primary | ICD-10-CM

## 2020-03-09 DIAGNOSIS — N95.2 ATROPHIC VAGINITIS: ICD-10-CM

## 2020-03-09 DIAGNOSIS — R53.83 OTHER FATIGUE: ICD-10-CM

## 2020-03-09 DIAGNOSIS — R10.84 GENERALIZED ABDOMINAL PAIN: ICD-10-CM

## 2020-03-09 LAB
BILIRUB BLD-MCNC: NEGATIVE MG/DL
CLARITY, POC: ABNORMAL
COLOR UR: YELLOW
GLUCOSE UR STRIP-MCNC: NEGATIVE MG/DL
KETONES UR QL: NEGATIVE
LEUKOCYTE EST, POC: NEGATIVE
NITRITE UR-MCNC: NEGATIVE MG/ML
PH UR: 6 [PH] (ref 5–8)
PROT UR STRIP-MCNC: NEGATIVE MG/DL
RBC # UR STRIP: NEGATIVE /UL
SP GR UR: 1.01 (ref 1–1.03)
UROBILINOGEN UR QL: NORMAL

## 2020-03-09 PROCEDURE — 84439 ASSAY OF FREE THYROXINE: CPT | Performed by: NURSE PRACTITIONER

## 2020-03-09 PROCEDURE — 84481 FREE ASSAY (FT-3): CPT | Performed by: NURSE PRACTITIONER

## 2020-03-09 PROCEDURE — 87086 URINE CULTURE/COLONY COUNT: CPT | Performed by: NURSE PRACTITIONER

## 2020-03-09 PROCEDURE — 80050 GENERAL HEALTH PANEL: CPT | Performed by: NURSE PRACTITIONER

## 2020-03-09 PROCEDURE — 99214 OFFICE O/P EST MOD 30 MIN: CPT | Performed by: NURSE PRACTITIONER

## 2020-03-09 PROCEDURE — 87076 CULTURE ANAEROBE IDENT EACH: CPT | Performed by: NURSE PRACTITIONER

## 2020-03-09 RX ORDER — NITROFURANTOIN 25; 75 MG/1; MG/1
100 CAPSULE ORAL DAILY
Qty: 56 CAPSULE | Refills: 3 | Status: SHIPPED | OUTPATIENT
Start: 2020-03-09 | End: 2020-05-20

## 2020-03-09 RX ORDER — PHENAZOPYRIDINE HYDROCHLORIDE 200 MG/1
200 TABLET, FILM COATED ORAL 3 TIMES DAILY PRN
Qty: 20 TABLET | Refills: 0 | Status: SHIPPED | OUTPATIENT
Start: 2020-03-09 | End: 2020-05-20

## 2020-03-09 NOTE — PROGRESS NOTES
Chief Complaint:          Chief Complaint   Patient presents with   • recurrent uti     follow up        HPI:   44 y.o. female.  Very pleasant patient returns to clinic for follow-up today.    She reports increased dysuria, urine frequency, and increased burning on urination.  She has frequency and urgency, reports is worse with intercourse. Her urine dipstick today is complete negative for any infection, however she is symptomatic with pelvic pain and lower abdominal discomfort.    Patient was placed on antibiotic prophylaxis by Dr. Marcus few months ago, secondary to her symptomatic UTIs.  She states it has been helpful, however she continues to have symptoms and occasional flare ups, worse after intercourse.  Dipstick today's complaint negative for any infection, and negative microscopic hematuria.    Initially, she reports her symptoms started when she began swimming, however she has since stopped over 6 months ago.  Now she thinks it is related to sexual intercourse, and her dry vaginal orifice.  She has a history of trace stress urinary incontinence, hereditary Spherocytosis, benign ovarian tumors, for which she had an early hysterectomy in 2018, uterine leiomyoma.  The rest of her history is unremarkable as listed below.    Past Medical History:        Past Medical History:   Diagnosis Date   • Allergic rhinitis    • Dysmenorrhea    • Dysuria    • Headache    • History of blood clots    • Jaundice    • Ovarian tumor     X 3 PER PATIENT   • Pelvic pain    • PONV (postoperative nausea and vomiting)    • Spherocytosis (familial) (CMS/HCC)    • Spherocytosis, hereditary (CMS/HCC)    • Uterine leiomyoma          The following portions of the patient's history were reviewed and updated as appropriate: allergies, current medications, past family history, past medical history, past social history, past surgical history and problem list.    Current Meds:     Current Outpatient Medications   Medication Sig Dispense  Refill   • aspirin 81 MG EC tablet Take 81 mg by mouth Daily.     • ketoconazole (NIZORAL) 2 % cream Apply  topically to the appropriate area as directed Every 12 (Twelve) Hours. 60 g 1   • omeprazole (priLOSEC) 40 MG capsule Take 1 capsule by mouth Daily. 30 capsule 3   • potassium chloride (K-DUR,KLOR-CON) 20 MEQ CR tablet Take 1 tablet by mouth Daily. 3 tablet 0   • Probiotic Product (PROBIOTIC DAILY PO) Take 1 tablet by mouth Daily.     • prochlorperazine (COMPAZINE) 10 MG tablet Take 1 tablet by mouth Every 6 (Six) Hours As Needed for Nausea or Vomiting. 20 tablet 0   • vitamin B-12 (CYANOCOBALAMIN) 1000 MCG tablet Take 1,000 mcg by mouth Daily.     • nitrofurantoin, macrocrystal-monohydrate, (MACROBID) 100 MG capsule Take 1 capsule by mouth Daily. 56 capsule 3   • phenazopyridine (PYRIDIUM) 200 MG tablet Take 1 tablet by mouth 3 (Three) Times a Day As Needed for Bladder Spasms. 20 tablet 0     Current Facility-Administered Medications   Medication Dose Route Frequency Provider Last Rate Last Dose   • conjugated estrogens (PREMARIN) vaginal cream 0.5 application  0.5 g Vaginal Daily Cheng-Akwa, Griselda, APRN            Allergies:      No Known Allergies     Past Surgical History:     Past Surgical History:   Procedure Laterality Date   • ABDOMINAL SURGERY     • CHOLECYSTECTOMY     • SKIN BIOPSY     • TOTAL LAPAROSCOPIC HYSTERECTOMY N/A 4/19/2018    Procedure: TOTAL LAPAROSCOPIC HYSTERECTOMY Left SALPINGO OOPHERECTOMY WITH DAVINCI SI ROBOT;  Surgeon: Kevin Laurent DO;  Location: [facility];  Service: DaVinci   • TUBAL ABDOMINAL LIGATION           Social History:     Social History     Socioeconomic History   • Marital status:      Spouse name: Not on file   • Number of children: Not on file   • Years of education: Not on file   • Highest education level: Not on file   Tobacco Use   • Smoking status: Never Smoker   • Smokeless tobacco: Never Used   Substance and Sexual Activity   • Alcohol use: No    • Drug use: No   • Sexual activity: Defer     Birth control/protection: Surgical       Family History:     Family History   Problem Relation Age of Onset   • No Known Problems Mother    • Hypertension Father    • Heart disease Father    • No Known Problems Sister    • No Known Problems Brother    • Bell's palsy Brother    • Breast cancer Neg Hx        Review of Systems:     Review of Systems   Constitutional: Positive for fatigue. Negative for activity change, chills and fever.   HENT: Negative for congestion and sinus pressure.    Eyes: Negative for blurred vision, pain and redness.   Respiratory: Negative for chest tightness and shortness of breath.    Cardiovascular: Negative for chest pain.   Gastrointestinal: Negative for abdominal pain, constipation, diarrhea, nausea and vomiting.   Endocrine: Negative for heat intolerance.   Genitourinary: Positive for dysuria and frequency. Negative for difficulty urinating, dyspareunia, flank pain, genital sores, hematuria, pelvic pain, pelvic pressure, urgency, urinary incontinence and vaginal discharge.   Musculoskeletal: Negative for back pain.   Skin: Positive for color change and pallor. Negative for rash.   Allergic/Immunologic: Negative for food allergies.   Neurological: Positive for headache. Negative for dizziness and confusion.   Hematological: Does not bruise/bleed easily.   Psychiatric/Behavioral: Positive for sleep disturbance and stress. Negative for behavioral problems and decreased concentration. The patient is not nervous/anxious.         Physical Exam:     Physical Exam   Constitutional: She is oriented to person, place, and time. She appears well-developed and well-nourished. She appears distressed.   HENT:   Head: Normocephalic and atraumatic.   Eyes: Pupils are equal, round, and reactive to light. EOM are normal.   Neck: Normal range of motion. No tracheal deviation present. No thyromegaly present.   Cardiovascular: Normal rate and regular rhythm.      No murmur heard.  Pulmonary/Chest: Effort normal and breath sounds normal. No stridor. No respiratory distress. She has no wheezes.   Abdominal: Soft. Bowel sounds are normal. There is no tenderness.   Genitourinary: Vagina normal. There is no tenderness on the right labia. There is no tenderness on the left labia. No vaginal discharge found.   Musculoskeletal: Normal range of motion. She exhibits no edema or deformity.   Neurological: She is alert and oriented to person, place, and time. No cranial nerve deficit or sensory deficit. Coordination normal.   Skin: Skin is warm and dry. Capillary refill takes less than 2 seconds. No rash noted. No erythema. No pallor.   Psychiatric: She has a normal mood and affect. Her behavior is normal. Judgment and thought content normal.       Procedure:       Assessment/Plan:   Recurrent urinary tract infections: Patient returns to clinic for follow-up today.  She reports increased dysuria, urine frequency, and increased burning on urination.  She has frequency and urgency, reports is worse with intercourse.     Her urine dipstick today is complete negative for any infection, however she is symptomatic with pelvic pain and lower abdominal discomfort.  We discussed Interstitial Cystitis, and she is to monitor her symptoms very closely.    We will send her urine out for culture today, will call her if not sensitive to current therapy.    The patient is currently on antibiotic prophylaxis with Macrobid and would like some refills.  We discussed she continue antibiotic prophylaxis, and take twice daily on day she is sexually active.     I recommend concomitant probiotics with treatment with antibiotics to protect the rectal reservoir including over-the-counter yogurt preparations to jameson oral pills containing the appropriate probiotics.    We discussed the types of organisms that are found in the urinary tract indicating that the vast majority are results of the patient's own  gastrointestinal crystal.  We discussed how many of the antibiotics that are utilized can actually exacerbate these infections by creating resistant organisms and there is only a very few antibiotics(Macrobid) that are concentrated in the urine and do not affect the rectal reservoir nor cause recurrent yeast vaginitis.      We discussed the risk factors for recurrent infections being intercourse in younger patients and atrophic changes in older patients(Unfortunately, she has both).  We discussed the symptoms that are found including pain, pressure, burning, frequency, urgency suprapubic pain and painful intercourse.     We further discussed post menopausal Vaginal atrophy (atrophic vaginitis) with patient as the  thinning, drying and inflammation of the vaginal walls that may occur when womens body has less estrogen.  Patient reports having a hysterectomy in 2018, and has only one ovary left secondary to ovarian tumors that were benign.    We discussed the facts that Vaginal atrophy occurs most often after menopause and may also lead to distressing urinary symptoms such as the burning and pain she is experiencing. I think a trial of estrogen cream for atrophic vaginitis is quite reasonable    Start Vaginal Estrace Cream as directed(place on  tip of finger and apply to vagina nightly x 2 weeks, then 2-3 times weekly).    Will see her back in ONE month to evaluate her symptoms.    Patient is agreeable to plan of care    Patient reports that she is not currently experiencing any symptoms of urinary incontinence.      Patient's Body mass index is 27.57 kg/m². BMI is above normal parameters. Recommendations include: educational material, exercise counseling and nutrition counseling.    Smoking Cessation Counseling:  Never a smoker.  Patient does not currently use any tobacco products.     Counseling was given to patient for the following topics diagnostic results including: Recurrent UTIs, Atrophic vaginitis, instructions  for management as follows: Continue antibiotic prophylaxis with Macrobid, probiotics, Pyridium as needed, increasing p.o. fluid intake and risk factor reductions including: Avoiding bladder irritants such as caffeine products, coffee, teas, avoiding spicy foods, bubble baths. The interim medical history and current results were reviewed.  A treatment plan with follow-up was made for Atrophic Vaginitis, Urinary tract infection without hematuria, site unspecified [N39.0].         This document has been electronically signed by Griselda Cheng-Akwa, APRN March 9, 2020 4:48 PM

## 2020-03-09 NOTE — PATIENT INSTRUCTIONS
Urinary Tract Infection, Adult  A urinary tract infection (UTI) is an infection of any part of the urinary tract. The urinary tract includes:  · The kidneys.  · The ureters.  · The bladder.  · The urethra.  These organs make, store, and get rid of pee (urine) in the body.  What are the causes?  This is caused by germs (bacteria) in your genital area. These germs grow and cause swelling (inflammation) of your urinary tract.  What increases the risk?  You are more likely to develop this condition if:  · You have a small, thin tube (catheter) to drain pee.  · You cannot control when you pee or poop (incontinence).  · You are female, and:  ? You use these methods to prevent pregnancy:  ? A medicine that kills sperm (spermicide).  ? A device that blocks sperm (diaphragm).  ? You have low levels of a female hormone (estrogen).  ? You are pregnant.  · You have genes that add to your risk.  · You are sexually active.  · You take antibiotic medicines.  · You have trouble peeing because of:  ? A prostate that is bigger than normal, if you are male.  ? A blockage in the part of your body that drains pee from the bladder (urethra).  ? A kidney stone.  ? A nerve condition that affects your bladder (neurogenic bladder).  ? Not getting enough to drink.  ? Not peeing often enough.  · You have other conditions, such as:  ? Diabetes.  ? A weak disease-fighting system (immune system).  ? Sickle cell disease.  ? Gout.  ? Injury of the spine.  What are the signs or symptoms?  Symptoms of this condition include:  · Needing to pee right away (urgently).  · Peeing often.  · Peeing small amounts often.  · Pain or burning when peeing.  · Blood in the pee.  · Pee that smells bad or not like normal.  · Trouble peeing.  · Pee that is cloudy.  · Fluid coming from the vagina, if you are female.  · Pain in the belly or lower back.  Other symptoms include:  · Throwing up (vomiting).  · No urge to eat.  · Feeling mixed up (confused).  · Being tired  and grouchy (irritable).  · A fever.  · Watery poop (diarrhea).  How is this treated?  This condition may be treated with:  · Antibiotic medicine.  · Other medicines.  · Drinking enough water.  Follow these instructions at home:    Medicines  · Take over-the-counter and prescription medicines only as told by your doctor.  · If you were prescribed an antibiotic medicine, take it as told by your doctor. Do not stop taking it even if you start to feel better.  General instructions  · Make sure you:  ? Pee until your bladder is empty.  ? Do not hold pee for a long time.  ? Empty your bladder after sex.  ? Wipe from front to back after pooping if you are a female. Use each tissue one time when you wipe.  · Drink enough fluid to keep your pee pale yellow.  · Keep all follow-up visits as told by your doctor. This is important.  Contact a doctor if:  · You do not get better after 1-2 days.  · Your symptoms go away and then come back.  Get help right away if:  · You have very bad back pain.  · You have very bad pain in your lower belly.  · You have a fever.  · You are sick to your stomach (nauseous).  · You are throwing up.  Summary  · A urinary tract infection (UTI) is an infection of any part of the urinary tract.  · This condition is caused by germs in your genital area.  · There are many risk factors for a UTI. These include having a small, thin tube to drain pee and not being able to control when you pee or poop.  · Treatment includes antibiotic medicines for germs.  · Drink enough fluid to keep your pee pale yellow.  This information is not intended to replace advice given to you by your health care provider. Make sure you discuss any questions you have with your health care provider.  Document Released: 06/05/2009 Document Revised: 12/05/2019 Document Reviewed: 06/27/2019  [employer] Interactive Patient Education © 2020 [employer]  Atrophic Vaginitis  Atrophic vaginitis is a condition in which the tissues that line  the vagina become dry and thin. This condition occurs in women who have stopped having their period. It is caused by a drop in a female hormone (estrogen). This hormone helps:  · To keep the vagina moist.  · To make a clear fluid. This clear fluid helps:  ? To make the vagina ready for sex.  ? To protect the vagina from infection.  If the lining of the vagina is dry and thin, it may cause irritation, burning, or itchiness. It may also:  · Make sex painful.  · Make an exam of your vagina painful.  · Cause bleeding.  · Make you lose interest in sex.  · Cause a burning feeling when you pee (urinate).  · Cause a brown or yellow fluid to come from your vagina.  Some women do not have symptoms.  Follow these instructions at home:  Medicines  · Take over-the-counter and prescription medicines only as told by your doctor.  · Do not use herbs or other medicines unless your doctor says it is okay.  · Use medicines for for dryness. These include:  ? Oils to make the vagina soft.  ? Creams.  ? Moisturizers.  General instructions  · Do not douche.  · Do not use products that can make your vagina dry. These include:  ? Scented sprays.  ? Scented tampons.  ? Scented soaps.  · Sex can help increase blood flow and soften the tissue in the vagina. If it hurts to have sex:  ? Tell your partner.  ? Use products to make sex more comfortable. Use these only as told by your doctor.  Contact a doctor if you:  · Have discharge from the vagina that is different than usual.  · Have a bad smell coming from your vagina.  · Have new symptoms.  · Do not get better.  · Get worse.  Summary  · Atrophic vaginitis is a condition in which the lining of the vagina becomes dry and thin.  · This condition affects women who have stopped having their periods.  · Treatment may include using products that help make the vagina soft.  · Call a doctor if do not get better with treatment.  This information is not intended to replace advice given to you by your  health care provider. Make sure you discuss any questions you have with your health care provider.  Document Released: 06/05/2009 Document Revised: 12/31/2018 Document Reviewed: 12/31/2018  [employer] Interactive Patient Education © 2020 [employer]

## 2020-03-10 ENCOUNTER — TELEPHONE (OUTPATIENT)
Dept: FAMILY MEDICINE CLINIC | Facility: CLINIC | Age: 45
End: 2020-03-10

## 2020-03-10 LAB
ALBUMIN SERPL-MCNC: 4.7 G/DL (ref 3.5–5.2)
ALBUMIN/GLOB SERPL: 2.1 G/DL
ALP SERPL-CCNC: 60 U/L (ref 39–117)
ALT SERPL W P-5'-P-CCNC: 16 U/L (ref 1–33)
ANION GAP SERPL CALCULATED.3IONS-SCNC: 10.2 MMOL/L (ref 5–15)
AST SERPL-CCNC: 20 U/L (ref 1–32)
BACTERIA SPEC AEROBE CULT: ABNORMAL
BASOPHILS # BLD AUTO: 0.02 10*3/MM3 (ref 0–0.2)
BASOPHILS NFR BLD AUTO: 0.3 % (ref 0–1.5)
BILIRUB SERPL-MCNC: 3.5 MG/DL (ref 0.2–1.2)
BUN BLD-MCNC: 7 MG/DL (ref 6–20)
BUN/CREAT SERPL: 10.4 (ref 7–25)
CALCIUM SPEC-SCNC: 9.4 MG/DL (ref 8.6–10.5)
CHLORIDE SERPL-SCNC: 104 MMOL/L (ref 98–107)
CO2 SERPL-SCNC: 23.8 MMOL/L (ref 22–29)
CREAT BLD-MCNC: 0.67 MG/DL (ref 0.57–1)
DEPRECATED RDW RBC AUTO: 48.8 FL (ref 37–54)
EOSINOPHIL # BLD AUTO: 0.08 10*3/MM3 (ref 0–0.4)
EOSINOPHIL NFR BLD AUTO: 1.1 % (ref 0.3–6.2)
ERYTHROCYTE [DISTWIDTH] IN BLOOD BY AUTOMATED COUNT: 14.1 % (ref 12.3–15.4)
GFR SERPL CREATININE-BSD FRML MDRD: 96 ML/MIN/1.73
GLOBULIN UR ELPH-MCNC: 2.2 GM/DL
GLUCOSE BLD-MCNC: 94 MG/DL (ref 65–99)
HCT VFR BLD AUTO: 33.5 % (ref 34–46.6)
HGB BLD-MCNC: 11.7 G/DL (ref 12–15.9)
IMM GRANULOCYTES # BLD AUTO: 0.03 10*3/MM3 (ref 0–0.05)
IMM GRANULOCYTES NFR BLD AUTO: 0.4 % (ref 0–0.5)
LYMPHOCYTES # BLD AUTO: 1.51 10*3/MM3 (ref 0.7–3.1)
LYMPHOCYTES NFR BLD AUTO: 20.6 % (ref 19.6–45.3)
MCH RBC QN AUTO: 33.6 PG (ref 26.6–33)
MCHC RBC AUTO-ENTMCNC: 34.9 G/DL (ref 31.5–35.7)
MCV RBC AUTO: 96.3 FL (ref 79–97)
MONOCYTES # BLD AUTO: 0.48 10*3/MM3 (ref 0.1–0.9)
MONOCYTES NFR BLD AUTO: 6.5 % (ref 5–12)
NEUTROPHILS # BLD AUTO: 5.22 10*3/MM3 (ref 1.7–7)
NEUTROPHILS NFR BLD AUTO: 71.1 % (ref 42.7–76)
NRBC BLD AUTO-RTO: 0.1 /100 WBC (ref 0–0.2)
PLATELET # BLD AUTO: 186 10*3/MM3 (ref 140–450)
PMV BLD AUTO: 11.4 FL (ref 6–12)
POTASSIUM BLD-SCNC: 3.7 MMOL/L (ref 3.5–5.2)
PROT SERPL-MCNC: 6.9 G/DL (ref 6–8.5)
RBC # BLD AUTO: 3.48 10*6/MM3 (ref 3.77–5.28)
SODIUM BLD-SCNC: 138 MMOL/L (ref 136–145)
T3FREE SERPL-MCNC: 3.26 PG/ML (ref 2–4.4)
T4 FREE SERPL-MCNC: 1.02 NG/DL (ref 0.93–1.7)
TSH SERPL DL<=0.05 MIU/L-ACNC: 2.14 UIU/ML (ref 0.27–4.2)
WBC NRBC COR # BLD: 7.34 10*3/MM3 (ref 3.4–10.8)

## 2020-03-10 NOTE — TELEPHONE ENCOUNTER
----- Message from OCTAVIA Roach sent at 3/10/2020  3:10 PM EDT -----  Labs are stable  Bilirubin better          Patient  Notified & verbalized understanding.

## 2020-05-15 RX ORDER — OMEPRAZOLE 40 MG/1
CAPSULE, DELAYED RELEASE ORAL
Qty: 30 CAPSULE | Refills: 3 | Status: SHIPPED | OUTPATIENT
Start: 2020-05-15 | End: 2020-05-20 | Stop reason: SDUPTHER

## 2020-05-20 ENCOUNTER — OFFICE VISIT (OUTPATIENT)
Dept: FAMILY MEDICINE CLINIC | Facility: CLINIC | Age: 45
End: 2020-05-20

## 2020-05-20 VITALS
TEMPERATURE: 99.6 F | SYSTOLIC BLOOD PRESSURE: 106 MMHG | HEART RATE: 76 BPM | WEIGHT: 167 LBS | BODY MASS INDEX: 26.84 KG/M2 | OXYGEN SATURATION: 97 % | HEIGHT: 66 IN | DIASTOLIC BLOOD PRESSURE: 64 MMHG

## 2020-05-20 DIAGNOSIS — H65.05 RECURRENT ACUTE SEROUS OTITIS MEDIA OF LEFT EAR: Primary | ICD-10-CM

## 2020-05-20 DIAGNOSIS — Z80.0 FAMILY HX OF COLON CANCER: ICD-10-CM

## 2020-05-20 DIAGNOSIS — K21.9 GASTROESOPHAGEAL REFLUX DISEASE WITHOUT ESOPHAGITIS: ICD-10-CM

## 2020-05-20 PROCEDURE — 99214 OFFICE O/P EST MOD 30 MIN: CPT | Performed by: NURSE PRACTITIONER

## 2020-05-20 RX ORDER — AMOXICILLIN 875 MG/1
875 TABLET, COATED ORAL 2 TIMES DAILY
Qty: 20 TABLET | Refills: 0 | Status: SHIPPED | OUTPATIENT
Start: 2020-05-20 | End: 2021-04-08

## 2020-05-20 RX ORDER — FLUTICASONE PROPIONATE 50 MCG
2 SPRAY, SUSPENSION (ML) NASAL DAILY
Qty: 1 BOTTLE | Refills: 5 | Status: SHIPPED | OUTPATIENT
Start: 2020-05-20 | End: 2021-04-08

## 2020-05-20 RX ORDER — OMEPRAZOLE 40 MG/1
40 CAPSULE, DELAYED RELEASE ORAL DAILY
Qty: 30 CAPSULE | Refills: 6 | Status: SHIPPED | OUTPATIENT
Start: 2020-05-20 | End: 2020-11-23

## 2020-05-20 NOTE — PROGRESS NOTES
"Modesta Powers is a 44 y.o. female.   Chief Compliant: The patient presents with Earache    Mother diagnosed last week with colon cancer.  Request referral for screening      Earache    There is pain in the left ear. This is a recurrent problem. The current episode started in the past 7 days. The problem has been gradually worsening. There has been no fever. The pain is at a severity of 4/10. The pain is mild. Associated symptoms include rhinorrhea and a sore throat. Pertinent negatives include no abdominal pain, coughing, diarrhea, ear discharge, headaches, hearing loss, neck pain, rash or vomiting. She has tried nothing for the symptoms.   Heartburn   She complains of heartburn and a sore throat. She reports no abdominal pain or no coughing. This is a chronic problem. The current episode started more than 1 year ago. The problem occurs occasionally. The problem has been waxing and waning. She has tried a histamine-2 antagonist for the symptoms. The treatment provided significant relief.      The following portions of the patient's history were reviewed and updated as appropriate: allergies, current medications, past family history, past medical history, past social history, past surgical history and problem list.      Review of Systems   Constitutional: Negative.  Negative for chills and fever.   HENT: Positive for ear pain, rhinorrhea and sore throat. Negative for ear discharge and hearing loss.    Respiratory: Negative.  Negative for cough.    Gastrointestinal: Positive for heartburn. Negative for abdominal pain, diarrhea and vomiting.        Intermittent heart burn     Musculoskeletal: Negative for neck pain.   Skin: Negative for rash.   Neurological: Negative for headaches.   All other systems reviewed and are negative.      Procedures    Vitals: Blood pressure 106/64, pulse 76, temperature 99.6 °F (37.6 °C), height 167.6 cm (66\"), weight 75.8 kg (167 lb), last menstrual period 04/07/2018, SpO2 97 %, " not currently breastfeeding.     Allergies: No Known Allergies       Objective   Physical Exam   Constitutional: She is oriented to person, place, and time. She appears well-developed and well-nourished. No distress.   HENT:   Head: Normocephalic.   Right Ear: Hearing, external ear and ear canal normal. Tympanic membrane is scarred.   Left Ear: Hearing, external ear and ear canal normal. Tympanic membrane is scarred. A middle ear effusion is present.   Nose: Mucosal edema present. Right sinus exhibits no maxillary sinus tenderness and no frontal sinus tenderness. Left sinus exhibits no maxillary sinus tenderness and no frontal sinus tenderness.   Mouth/Throat: Uvula is midline, oropharynx is clear and moist and mucous membranes are normal. No oropharyngeal exudate.   Cardiovascular: Normal rate, regular rhythm and normal heart sounds.   No murmur heard.  Pulmonary/Chest: Effort normal and breath sounds normal.   Abdominal: Soft. Bowel sounds are normal.   Neurological: She is alert and oriented to person, place, and time.   Skin: Skin is warm and dry. She is not diaphoretic.   Psychiatric: She has a normal mood and affect. Her behavior is normal.   Nursing note and vitals reviewed.      During this visit the following were done:  Labs Reviewed []    Labs Ordered []    Radiology Reports Reviewed []    Radiology Ordered []    PCP Records Reviewed []    Referring Provider Records Reviewed []    ER Records Reviewed []    Hospital Records Reviewed []    History Obtained From Family []    Radiology Images Reviewed []    Other Reviewed []    Records Requested []      Assessment/Plan   Discussed with patient impression and plan, patient verbalizes understanding  Betty was seen today for earache.    Diagnoses and all orders for this visit:    Recurrent acute serous otitis media of left ear  -     amoxicillin (AMOXIL) 875 MG tablet; Take 1 tablet by mouth 2 (Two) Times a Day.  -     fluticasone (Flonase) 50 MCG/ACT nasal  spray; 2 sprays into the nostril(s) as directed by provider Daily.  -     neomycin-polymyxin-hydrocortisone (CORTISPORIN) 3.5-89062-1 otic solution; Administer 3 drops to the right ear 4 (Four) Times a Day.    Family hx of colon cancer  -     Ambulatory Referral to Gastroenterology    Gastroesophageal reflux disease without esophagitis  -     omeprazole (priLOSEC) 40 MG capsule; Take 1 capsule by mouth Daily.

## 2020-11-22 DIAGNOSIS — R35.0 FREQUENCY OF MICTURITION: ICD-10-CM

## 2020-11-22 DIAGNOSIS — N39.0 URINARY TRACT INFECTION WITHOUT HEMATURIA, SITE UNSPECIFIED: ICD-10-CM

## 2020-11-22 DIAGNOSIS — K21.9 GASTROESOPHAGEAL REFLUX DISEASE WITHOUT ESOPHAGITIS: ICD-10-CM

## 2020-11-23 RX ORDER — NITROFURANTOIN 25; 75 MG/1; MG/1
CAPSULE ORAL
Qty: 56 CAPSULE | Refills: 3 | Status: SHIPPED | OUTPATIENT
Start: 2020-11-23 | End: 2021-04-08

## 2020-11-23 RX ORDER — OMEPRAZOLE 40 MG/1
CAPSULE, DELAYED RELEASE ORAL
Qty: 30 CAPSULE | Refills: 3 | Status: SHIPPED | OUTPATIENT
Start: 2020-11-23 | End: 2020-12-29 | Stop reason: SDUPTHER

## 2020-12-29 DIAGNOSIS — K21.9 GASTROESOPHAGEAL REFLUX DISEASE WITHOUT ESOPHAGITIS: ICD-10-CM

## 2020-12-29 NOTE — TELEPHONE ENCOUNTER
Caller: Powers Betty    Relationship: Self    Best call back number: [phone]    Medication needed:   Requested Prescriptions     Pending Prescriptions Disp Refills   • omeprazole (priLOSEC) 40 MG capsule 30 capsule 3     Sig: Take 1 capsule by mouth Daily.       When do you need the refill by: TODAY    What details did the patient provide when requesting the medication: PATIENT IS COMPLETELY OUT OF MEDICATION.    Does the patient have less than a 3 day supply:  [x] Yes  [] No    What is the patient's preferred pharmacy: [address] [phone] [facility] [phone]

## 2020-12-30 RX ORDER — OMEPRAZOLE 40 MG/1
40 CAPSULE, DELAYED RELEASE ORAL DAILY
Qty: 30 CAPSULE | Refills: 3 | Status: SHIPPED | OUTPATIENT
Start: 2020-12-30 | End: 2021-04-08

## 2021-04-08 ENCOUNTER — OFFICE VISIT (OUTPATIENT)
Dept: FAMILY MEDICINE CLINIC | Facility: CLINIC | Age: 46
End: 2021-04-08

## 2021-04-08 VITALS
HEART RATE: 89 BPM | WEIGHT: 164 LBS | TEMPERATURE: 96.9 F | HEIGHT: 66 IN | SYSTOLIC BLOOD PRESSURE: 154 MMHG | OXYGEN SATURATION: 99 % | DIASTOLIC BLOOD PRESSURE: 84 MMHG | BODY MASS INDEX: 26.36 KG/M2

## 2021-04-08 DIAGNOSIS — E61.1 IRON DEFICIENCY: ICD-10-CM

## 2021-04-08 DIAGNOSIS — M99.04 SEGMENTAL AND SOMATIC DYSFUNCTION OF SACRAL REGION: ICD-10-CM

## 2021-04-08 DIAGNOSIS — E80.6 HYPERBILIRUBINEMIA: ICD-10-CM

## 2021-04-08 DIAGNOSIS — M53.3 COCCYDYNIA: ICD-10-CM

## 2021-04-08 DIAGNOSIS — D58.0 HEREDITARY SPHEROCYTOSIS (HCC): ICD-10-CM

## 2021-04-08 DIAGNOSIS — D53.9 MACROCYTIC ANEMIA: Primary | ICD-10-CM

## 2021-04-08 PROCEDURE — 98925 OSTEOPATH MANJ 1-2 REGIONS: CPT | Performed by: FAMILY MEDICINE

## 2021-04-08 PROCEDURE — 99214 OFFICE O/P EST MOD 30 MIN: CPT | Performed by: FAMILY MEDICINE

## 2021-04-08 RX ORDER — FOLIC ACID 1 MG/1
TABLET ORAL EVERY 6 HOURS
COMMUNITY
Start: 2018-03-02 | End: 2022-02-17

## 2021-04-08 NOTE — PROGRESS NOTES
Subjective   Betty Powers is a 45 y.o. female.   Pt presents today with CC of Tailbone Pain      History of Present Illness   Patient reports 2 months of tailbone pain with sitting.  She denies any known trauma.  It comes and goes.  But she states that sometimes sitting is painful.  She would like to discuss diagnostic and treatment options.  #2 she has history of iron deficiency anemia with known hereditary spherocytosis.  She is agreeable to labs today.  She is due for labs.       The following portions of the patient's history were reviewed and updated as appropriate: allergies, current medications, past family history, past medical history, past social history, past surgical history and problem list.    Review of Systems   Constitutional: Negative for chills, fever and unexpected weight loss.   HENT: Negative for congestion and sore throat.    Eyes: Negative for blurred vision and visual disturbance.   Respiratory: Negative for cough and wheezing.    Cardiovascular: Negative for chest pain and palpitations.   Gastrointestinal: Negative for abdominal pain and diarrhea.   Endocrine: Negative for cold intolerance and heat intolerance.   Genitourinary: Negative for dysuria.   Musculoskeletal: Negative for arthralgias and neck stiffness.   Neurological: Negative for dizziness, seizures and syncope.   Psychiatric/Behavioral: Negative for self-injury, suicidal ideas and depressed mood.       Objective   Physical Exam  Vitals and nursing note reviewed.   Constitutional:       Appearance: She is well-developed.   HENT:      Head: Normocephalic and atraumatic.      Right Ear: External ear normal.      Left Ear: External ear normal.      Nose: Nose normal.   Eyes:      Conjunctiva/sclera: Conjunctivae normal.      Pupils: Pupils are equal, round, and reactive to light.   Cardiovascular:      Rate and Rhythm: Normal rate and regular rhythm.      Heart sounds: Normal heart sounds.   Pulmonary:      Effort: Pulmonary effort is  normal.      Breath sounds: Normal breath sounds.   Abdominal:      General: Bowel sounds are normal.      Palpations: Abdomen is soft.   Musculoskeletal:      Cervical back: Normal range of motion and neck supple.      Comments: Tenderness to palpation of her right sacrococcygeal joint.  Osteopathic: Mild piriformis spasm on the right, sacrum is left on left.   Skin:     General: Skin is warm and dry.   Neurological:      Mental Status: She is alert and oriented to person, place, and time.   Psychiatric:         Behavior: Behavior normal.           Assessment/Plan   Diagnoses and all orders for this visit:    1. Macrocytic anemia (Primary)  -     CBC Auto Differential; Future  -     Comprehensive Metabolic Panel; Future  -     Lipid Panel; Future  She is not fasting today.  She will return for labs next week.  2. Hyperbilirubinemia  -     Comprehensive Metabolic Panel; Future    3. Iron deficiency  -     CBC Auto Differential; Future  -     Comprehensive Metabolic Panel; Future  -     Lipid Panel; Future    4. Hereditary spherocytosis (CMS/HCC)  -     CBC Auto Differential; Future  -     Comprehensive Metabolic Panel; Future  -     Lipid Panel; Future    5. Coccydynia  -     XR sacrum and coccyx; Future  Recommend seeing if treatment was helpful today.  The pain in her coccyx is modifiable with manipulation of the coccyx bone, pain seems to be originating from the joint between sacrum and coccyx, particularly on the right side, tendinitis of the piriformis also possible.  Manipulation today was estimated to be 30% effective.  6. Segmental and somatic dysfunction of sacral region  OMT procedure, expected risks and benefits discussed with patient, who elects to proceed. Verbal consent obtained. 1 techniques were used. Pt tolerated OMT well. No complications noted. Patient to do home stretches as shown in clinic today or instructed in after visit summary.                      Patient's Body mass index is 26.47 kg/m².  BMI is above normal parameters. Recommendations include: exercise counseling and nutrition counseling.

## 2021-04-22 ENCOUNTER — LAB (OUTPATIENT)
Dept: FAMILY MEDICINE CLINIC | Facility: CLINIC | Age: 46
End: 2021-04-22

## 2021-04-22 DIAGNOSIS — E80.6 HYPERBILIRUBINEMIA: ICD-10-CM

## 2021-04-22 DIAGNOSIS — D53.9 MACROCYTIC ANEMIA: ICD-10-CM

## 2021-04-22 DIAGNOSIS — D58.0 HEREDITARY SPHEROCYTOSIS (HCC): ICD-10-CM

## 2021-04-22 DIAGNOSIS — E61.1 IRON DEFICIENCY: ICD-10-CM

## 2021-04-22 PROCEDURE — 80061 LIPID PANEL: CPT | Performed by: FAMILY MEDICINE

## 2021-04-22 PROCEDURE — 80053 COMPREHEN METABOLIC PANEL: CPT | Performed by: FAMILY MEDICINE

## 2021-04-22 PROCEDURE — 36415 COLL VENOUS BLD VENIPUNCTURE: CPT

## 2021-04-22 PROCEDURE — 85025 COMPLETE CBC W/AUTO DIFF WBC: CPT | Performed by: FAMILY MEDICINE

## 2021-04-23 LAB
ALBUMIN SERPL-MCNC: 4.6 G/DL (ref 3.5–5.2)
ALBUMIN/GLOB SERPL: 2 G/DL
ALP SERPL-CCNC: 58 U/L (ref 39–117)
ALT SERPL W P-5'-P-CCNC: 10 U/L (ref 1–33)
ANION GAP SERPL CALCULATED.3IONS-SCNC: 12.4 MMOL/L (ref 5–15)
AST SERPL-CCNC: 18 U/L (ref 1–32)
BASOPHILS # BLD AUTO: 0.03 10*3/MM3 (ref 0–0.2)
BASOPHILS NFR BLD AUTO: 0.4 % (ref 0–1.5)
BILIRUB SERPL-MCNC: 2.8 MG/DL (ref 0–1.2)
BUN SERPL-MCNC: 7 MG/DL (ref 6–20)
BUN/CREAT SERPL: 12.7 (ref 7–25)
CALCIUM SPEC-SCNC: 8.9 MG/DL (ref 8.6–10.5)
CHLORIDE SERPL-SCNC: 107 MMOL/L (ref 98–107)
CHOLEST SERPL-MCNC: 118 MG/DL (ref 0–200)
CO2 SERPL-SCNC: 20.6 MMOL/L (ref 22–29)
CREAT SERPL-MCNC: 0.55 MG/DL (ref 0.57–1)
DEPRECATED RDW RBC AUTO: 46.6 FL (ref 37–54)
EOSINOPHIL # BLD AUTO: 0.06 10*3/MM3 (ref 0–0.4)
EOSINOPHIL NFR BLD AUTO: 0.9 % (ref 0.3–6.2)
ERYTHROCYTE [DISTWIDTH] IN BLOOD BY AUTOMATED COUNT: 13.1 % (ref 12.3–15.4)
GFR SERPL CREATININE-BSD FRML MDRD: 120 ML/MIN/1.73
GLOBULIN UR ELPH-MCNC: 2.3 GM/DL
GLUCOSE SERPL-MCNC: 82 MG/DL (ref 65–99)
HCT VFR BLD AUTO: 37.3 % (ref 34–46.6)
HDLC SERPL-MCNC: 44 MG/DL (ref 40–60)
HGB BLD-MCNC: 13.1 G/DL (ref 12–15.9)
IMM GRANULOCYTES # BLD AUTO: 0.01 10*3/MM3 (ref 0–0.05)
IMM GRANULOCYTES NFR BLD AUTO: 0.1 % (ref 0–0.5)
LDLC SERPL CALC-MCNC: 59 MG/DL (ref 0–100)
LDLC/HDLC SERPL: 1.34 {RATIO}
LYMPHOCYTES # BLD AUTO: 1.73 10*3/MM3 (ref 0.7–3.1)
LYMPHOCYTES NFR BLD AUTO: 25.5 % (ref 19.6–45.3)
MCH RBC QN AUTO: 34.5 PG (ref 26.6–33)
MCHC RBC AUTO-ENTMCNC: 35.1 G/DL (ref 31.5–35.7)
MCV RBC AUTO: 98.2 FL (ref 79–97)
MONOCYTES # BLD AUTO: 0.44 10*3/MM3 (ref 0.1–0.9)
MONOCYTES NFR BLD AUTO: 6.5 % (ref 5–12)
NEUTROPHILS NFR BLD AUTO: 4.51 10*3/MM3 (ref 1.7–7)
NEUTROPHILS NFR BLD AUTO: 66.6 % (ref 42.7–76)
NRBC BLD AUTO-RTO: 0 /100 WBC (ref 0–0.2)
PLATELET # BLD AUTO: 198 10*3/MM3 (ref 140–450)
PMV BLD AUTO: 12.1 FL (ref 6–12)
POTASSIUM SERPL-SCNC: 3.5 MMOL/L (ref 3.5–5.2)
PROT SERPL-MCNC: 6.9 G/DL (ref 6–8.5)
RBC # BLD AUTO: 3.8 10*6/MM3 (ref 3.77–5.28)
SODIUM SERPL-SCNC: 140 MMOL/L (ref 136–145)
TRIGL SERPL-MCNC: 76 MG/DL (ref 0–150)
VLDLC SERPL-MCNC: 15 MG/DL (ref 5–40)
WBC # BLD AUTO: 6.78 10*3/MM3 (ref 3.4–10.8)

## 2021-04-27 ENCOUNTER — TELEPHONE (OUTPATIENT)
Dept: FAMILY MEDICINE CLINIC | Facility: CLINIC | Age: 46
End: 2021-04-27

## 2021-04-27 NOTE — TELEPHONE ENCOUNTER
----- Message from Chau Farias DO sent at 4/26/2021  8:28 PM EDT -----  No concerns on her blood work.  Will discuss with her at follow-up.    Patient notified & verbalized understanding.

## 2021-04-28 ENCOUNTER — HOSPITAL ENCOUNTER (OUTPATIENT)
Dept: GENERAL RADIOLOGY | Facility: HOSPITAL | Age: 46
Discharge: HOME | End: 2021-04-28
Admitting: FAMILY MEDICINE

## 2021-04-28 DIAGNOSIS — M53.3 COCCYDYNIA: ICD-10-CM

## 2021-04-28 PROCEDURE — 72220 X-RAY EXAM SACRUM TAILBONE: CPT | Performed by: RADIOLOGY

## 2021-04-28 PROCEDURE — 72220 X-RAY EXAM SACRUM TAILBONE: CPT

## 2021-04-29 ENCOUNTER — PROCEDURE VISIT (OUTPATIENT)
Dept: FAMILY MEDICINE CLINIC | Facility: CLINIC | Age: 46
End: 2021-04-29

## 2021-04-29 VITALS
WEIGHT: 163.4 LBS | SYSTOLIC BLOOD PRESSURE: 135 MMHG | DIASTOLIC BLOOD PRESSURE: 84 MMHG | HEIGHT: 66 IN | OXYGEN SATURATION: 98 % | BODY MASS INDEX: 26.26 KG/M2 | HEART RATE: 63 BPM | TEMPERATURE: 97.7 F

## 2021-04-29 DIAGNOSIS — L91.8 SKIN TAGS, MULTIPLE ACQUIRED: Primary | ICD-10-CM

## 2021-04-29 DIAGNOSIS — M53.3 COCCYDYNIA: ICD-10-CM

## 2021-04-29 PROCEDURE — 99213 OFFICE O/P EST LOW 20 MIN: CPT | Performed by: FAMILY MEDICINE

## 2021-05-02 NOTE — PROGRESS NOTES
Subjective   Betty Powers is a 45 y.o. female.   Pt presents today with CC of Procedure (skin tag removal)      History of Present Illness   1.  Patient is here to follow-up on coccydynia.  She reports that she had about 24 hours of 50% relief after manipulation her last visit, but her pain soon recovered.  Her x-ray was within normal limits.  She would further evaluation.  #2 she is here to follow-up on multiple skin tags on her neck.  They often get hung on jewelry and clothing, and are bothersome to her.  If indicated, she would like them removed today.       The following portions of the patient's history were reviewed and updated as appropriate: allergies, current medications, past family history, past medical history, past social history, past surgical history and problem list.    Review of Systems   Constitutional: Negative for chills, fever and unexpected weight loss.   HENT: Negative for congestion and sore throat.    Eyes: Negative for blurred vision and visual disturbance.   Respiratory: Negative for cough and wheezing.    Cardiovascular: Negative for chest pain and palpitations.   Gastrointestinal: Negative for abdominal pain and diarrhea.   Endocrine: Negative for cold intolerance and heat intolerance.   Genitourinary: Negative for dysuria.   Musculoskeletal: Negative for arthralgias and neck stiffness.   Neurological: Negative for dizziness, seizures and syncope.   Psychiatric/Behavioral: Negative for self-injury, suicidal ideas and depressed mood.       Objective   Physical Exam  Vitals and nursing note reviewed.   Constitutional:       Appearance: She is well-developed.   HENT:      Head: Normocephalic and atraumatic.      Right Ear: External ear normal.      Left Ear: External ear normal.      Nose: Nose normal.   Eyes:      Conjunctiva/sclera: Conjunctivae normal.      Pupils: Pupils are equal, round, and reactive to light.   Cardiovascular:      Rate and Rhythm: Normal rate and regular rhythm.       Heart sounds: Normal heart sounds.   Pulmonary:      Effort: Pulmonary effort is normal.      Breath sounds: Normal breath sounds.   Abdominal:      General: Bowel sounds are normal.      Palpations: Abdomen is soft.   Musculoskeletal:      Cervical back: Normal range of motion and neck supple.   Skin:     General: Skin is warm and dry.      Comments: Multiple skin tags on her neck.  The largest include on the left neck below her ear there are 2 that protrude about 3 mm and are pedunculated, there is also a broad-based skin tag on the back of her neck, slightly to the right side below the hairline by about an inch.  She also has multiple others that are small around her neck that she would like removed if possible.   Neurological:      Mental Status: She is alert and oriented to person, place, and time.   Psychiatric:         Behavior: Behavior normal.       Skin Tag Removal    Date/Time: 5/2/2021 3:02 PM  Performed by: Chau Farias DO  Authorized by: Chau Farias DO   Preparation: Patient was prepped and draped in the usual sterile fashion.  Local anesthesia used: yes  Anesthesia: local infiltration    Anesthesia:  Local anesthesia used: yes  Local Anesthetic: lidocaine 1% with epinephrine  Anesthetic total: 1 mL    Sedation:  Patient sedated: no    Patient tolerance: patient tolerated the procedure well with no immediate complications  Comments: 3 larger skin tags removed on her left neck and the back of her neck as noted above.  Bleeding was stopped with gentle pressure with gauze.  Within 15 minutes all bleeding has stopped.  She tolerated it well.  5 other small skin tags that did not bleed significantly or also removed, no problems with procedure.  She tolerated it well.  No complications.            Assessment/Plan   Diagnoses and all orders for this visit:    1. Skin tags, multiple acquired (Primary)  Procedure as above.  She tolerated the procedure well.  Follow-up as needed.  2.  Coccydynia  -     Ambulatory Referral to Orthopedic Surgery    Manipulation was only beneficial in the short-term.  She has tried changing how she sits, she is tried various exercises.  We will refer her to local sports medicine doctor that does injections, manipulation, and may be able to help her.  Recommend referral to Dr. Ward.             Patient's Body mass index is 26.39 kg/m². BMI is above normal parameters. Recommendations include: exercise counseling and nutrition counseling.

## 2021-05-25 ENCOUNTER — OFFICE VISIT (OUTPATIENT)
Dept: ORTHOPEDIC SURGERY | Facility: CLINIC | Age: 46
End: 2021-05-25

## 2021-05-25 VITALS
HEIGHT: 66 IN | SYSTOLIC BLOOD PRESSURE: 131 MMHG | DIASTOLIC BLOOD PRESSURE: 80 MMHG | BODY MASS INDEX: 25.71 KG/M2 | WEIGHT: 160 LBS | HEART RATE: 73 BPM

## 2021-05-25 DIAGNOSIS — M54.50 CHRONIC RIGHT-SIDED LOW BACK PAIN, UNSPECIFIED WHETHER SCIATICA PRESENT: Primary | ICD-10-CM

## 2021-05-25 DIAGNOSIS — M99.04 SEGMENTAL AND SOMATIC DYSFUNCTION OF SACRAL REGION: ICD-10-CM

## 2021-05-25 DIAGNOSIS — M53.3 NONTRAUMATIC COCCYDYNIA: ICD-10-CM

## 2021-05-25 DIAGNOSIS — G89.29 CHRONIC RIGHT-SIDED LOW BACK PAIN, UNSPECIFIED WHETHER SCIATICA PRESENT: Primary | ICD-10-CM

## 2021-05-25 PROCEDURE — 96372 THER/PROPH/DIAG INJ SC/IM: CPT | Performed by: FAMILY MEDICINE

## 2021-05-25 PROCEDURE — 99204 OFFICE O/P NEW MOD 45 MIN: CPT | Performed by: FAMILY MEDICINE

## 2021-05-25 PROCEDURE — 98926 OSTEOPATH MANJ 3-4 REGIONS: CPT | Performed by: FAMILY MEDICINE

## 2021-05-25 RX ORDER — NAPROXEN 500 MG/1
500 TABLET ORAL 2 TIMES DAILY WITH MEALS
Qty: 60 TABLET | Refills: 1 | Status: SHIPPED | OUTPATIENT
Start: 2021-05-25 | End: 2022-02-17

## 2021-05-25 RX ORDER — KETOROLAC TROMETHAMINE 30 MG/ML
60 INJECTION, SOLUTION INTRAMUSCULAR; INTRAVENOUS ONCE
Status: DISCONTINUED | OUTPATIENT
Start: 2021-05-25 | End: 2021-05-25

## 2021-05-25 RX ORDER — METHYLPREDNISOLONE ACETATE 80 MG/ML
80 INJECTION, SUSPENSION INTRA-ARTICULAR; INTRALESIONAL; INTRAMUSCULAR; SOFT TISSUE ONCE
Status: COMPLETED | OUTPATIENT
Start: 2021-05-25 | End: 2021-05-25

## 2021-05-25 RX ORDER — KETOROLAC TROMETHAMINE 30 MG/ML
60 INJECTION, SOLUTION INTRAMUSCULAR; INTRAVENOUS ONCE
Status: COMPLETED | OUTPATIENT
Start: 2021-05-25 | End: 2021-05-25

## 2021-05-25 RX ADMIN — METHYLPREDNISOLONE ACETATE 80 MG: 80 INJECTION, SUSPENSION INTRA-ARTICULAR; INTRALESIONAL; INTRAMUSCULAR; SOFT TISSUE at 15:20

## 2021-05-25 RX ADMIN — KETOROLAC TROMETHAMINE 60 MG: 30 INJECTION, SOLUTION INTRAMUSCULAR; INTRAVENOUS at 15:18

## 2021-05-25 NOTE — PROGRESS NOTES
New Patient Visit      Patient: Betty Powers  YOB: 1975  Date of Encounter: 05/25/2021  PCP: Kenya Rangel APRN  Referring Provider: DO Modesta Zarate   Betty Powers is a 45 y.o. female who presents to the office today for evaluation of Tailbone Pain      Chief Complaint:   Chief Complaint   Patient presents with   • Tailbone Pain       HPI:   HPI  New patient who presents complaining of tailbone and coccyx pain since December 2020 without any injury. Denies any symptoms related to bowel movements urination or intercourse. Patient gets significant pain when sitting. Pain sometimes is in the left glutes and can radiate down the hip or even back up to the shoulders. Patient was given a doughnut pillow by her PCP which is helping as she gets significant pain when sitting. 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 Had OMT treatments from her PCP which gave her pain relief for short time and would like to get further treatments. Not currently on any medications and she does not like taking them she does not need them.  Active Problem List:  Patient Active Problem List   Diagnosis   • Hereditary spherocytosis (CMS/HCC)   • Jaundice   • Abnormal uterine bleeding (AUB)   • Urinary tract infection with hematuria       Past Medical History:  Past Medical History:   Diagnosis Date   • Allergic rhinitis    • Blood disease    • Dysmenorrhea    • Dysuria    • Headache    • History of blood clots    • Jaundice    • Ovarian tumor     X 3 PER PATIENT   • Pelvic pain    • PONV (postoperative nausea and vomiting)    • Spherocytosis (familial) (CMS/HCC)    • Spherocytosis, hereditary (CMS/HCC)    • Uterine leiomyoma        Past Surgical History:  Past Surgical History:   Procedure Laterality Date   • ABDOMINAL SURGERY     • CHOLECYSTECTOMY     • SKIN BIOPSY     • TOTAL LAPAROSCOPIC HYSTERECTOMY N/A 4/19/2018    Procedure: TOTAL  LAPAROSCOPIC HYSTERECTOMY Left SALPINGO OOPHERECTOMY WITH DAVINCI SI ROBOT;  Surgeon: Kevin Laurent DO;  Location: [facility];  Service: DaVinci   • TUBAL ABDOMINAL LIGATION         Family History:  Family History   Problem Relation Age of Onset   • Cancer Mother    • Hypertension Mother    • Hypertension Father    • Heart disease Father    • Cancer Father    • No Known Problems Sister    • No Known Problems Brother    • Bell's palsy Brother    • Breast cancer Neg Hx        Social History:  Social History     Socioeconomic History   • Marital status:      Spouse name: Not on file   • Number of children: Not on file   • Years of education: Not on file   • Highest education level: Not on file   Tobacco Use   • Smoking status: Never Smoker   • Smokeless tobacco: Never Used   Vaping Use   • Vaping Use: Never used   Substance and Sexual Activity   • Alcohol use: No   • Drug use: No   • Sexual activity: Defer     Birth control/protection: Surgical       Medications:  Current Outpatient Medications   Medication Sig Dispense Refill   • aspirin 81 MG EC tablet Take 81 mg by mouth Daily.     • Ferrous Gluconate-C-Folic Acid (IRON-C PO) Take 325 mg by mouth Every 12 (Twelve) Hours.     • folic acid (FOLVITE) 1 MG tablet Take  by mouth Every 6 (Six) Hours.     • Probiotic Product (PROBIOTIC DAILY PO) Take 1 tablet by mouth Daily.     • naproxen (NAPROSYN) 500 MG tablet Take 1 tablet by mouth 2 (Two) Times a Day With Meals. 60 tablet 1   • vitamin B-12 (CYANOCOBALAMIN) 1000 MCG tablet Take 1,000 mcg by mouth Daily.       Current Facility-Administered Medications   Medication Dose Route Frequency Provider Last Rate Last Admin   • ketorolac (TORADOL) injection 60 mg  60 mg Intramuscular Once Brian Wadr DO       • methylPREDNISolone acetate (DEPO-medrol) injection 80 mg  80 mg Intramuscular Once Brian Ward DO           Allergies:  No Known Allergies    Review of Systems:   Review of Systems  "  Constitutional: Negative for fever.   Respiratory: Negative for shortness of breath.    Cardiovascular: Negative for chest pain.   Musculoskeletal: Positive for arthralgias and myalgias.   Neurological: Negative for weakness and numbness.       Physical Exam:   Physical Exam  Vitals and nursing note reviewed.   Constitutional:       General: She is not in acute distress.     Appearance: Normal appearance.   Pulmonary:      Effort: Pulmonary effort is normal. No respiratory distress.   Musculoskeletal:      Lumbar back: Spasms and tenderness present. Negative right straight leg raise test and negative left straight leg raise test.        Back:       Comments: Tender over lower half of sacrum and coccyx.  Nontender SI joints but restricted range of motion.  Nontender lumbar spine but tender over right side paraspinals with spasm.  Positive right femoral nerve traction test.  Negative straight leg raise   Skin:     General: Skin is warm and dry.      Findings: No erythema.   Neurological:      General: No focal deficit present.      Mental Status: She is alert.      Sensory: No sensory deficit.      Motor: No weakness.       GENERAL: 45 y.o. female, alert and oriented X 3 in no acute distress.   Visit Vitals  /80   Pulse 73   Ht 167.6 cm (66\")   Wt 72.6 kg (160 lb)   LMP 04/07/2018 (Exact Date)   BMI 25.82 kg/m²       Radiology Results:    XR Sacrum & Coccyx    Result Date: 4/28/2021    No acute findings in the sacrum or coccyx.  This report was finalized on 4/28/2021 1:32 PM by Dr. Charly Sarabia MD.        Osteopathic Manipulative Treatment - OMT - Procedure Note    OMT Procedure  Indications: TART findings, muscle spasm, pain    Risks and benefits discussed and patient gave verbal consent to treat    Regions treated: Pelvis, Sacrum and Lower Extremity    Findings: Pelvis Left Posterior Innominate Rotation, Sacrum Right Sacral Restriction or Left Sacral Restriction or Lower Extremity Left Psoas " Restriction    Modalities: HVLA and Muscle Energy    Patient reports decreased pain, improved range of motion, and improved functionality after treatment. There were no adverse effects.      Assessment & Plan:   Assessment/Plan   Diagnoses and all orders for this visit:    1. Chronic right-sided low back pain, unspecified whether sciatica present (Primary)  -     naproxen (NAPROSYN) 500 MG tablet; Take 1 tablet by mouth 2 (Two) Times a Day With Meals.  Dispense: 60 tablet; Refill: 1  -     ketorolac (TORADOL) injection 60 mg  -     methylPREDNISolone acetate (DEPO-medrol) injection 80 mg    2. Nontraumatic coccydynia  -     naproxen (NAPROSYN) 500 MG tablet; Take 1 tablet by mouth 2 (Two) Times a Day With Meals.  Dispense: 60 tablet; Refill: 1  -     ketorolac (TORADOL) injection 60 mg  -     methylPREDNISolone acetate (DEPO-medrol) injection 80 mg    3. Segmental and somatic dysfunction of sacral region        MEDICATION ISSUES:  Discussed medication options and treatment plan of prescribed medication as well as the risks, benefits, and side effects including potential falls, possible impaired driving and metabolic adversities among others. Patient is agreeable to call the office with any worsening of symptoms or onset of side effects. Patient is agreeable to call 911 or go to the nearest ER should he/she begin having SI/HI.     MEDS ORDERED DURING VISIT:  New Medications Ordered This Visit   Medications   • naproxen (NAPROSYN) 500 MG tablet     Sig: Take 1 tablet by mouth 2 (Two) Times a Day With Meals.     Dispense:  60 tablet     Refill:  1   • ketorolac (TORADOL) injection 60 mg   • methylPREDNISolone acetate (DEPO-medrol) injection 80 mg      Gave patient a dose of Toradol and Depo-Medrol in the office IM and will continue oral NSAID for the next 2 weeks starting tomorrow.  Continue using your donut pillow.  Gave patient home exercises for SI and psoas.  Consider pelvic floor therapy or advanced imaging if not  improving.  Patient did have notable improvement in pain after OMT today in the office and after her PCP performed it previously.  Consider lumbar spine imaging. Follow-up for further OMT treatments.          This document has been electronically signed by Brian Ward DO   May 25, 2021 15:12 EDT    Part of this note may be an electronic transcription/translation of spoken language to printed text using the Dragon Dictation System.

## 2021-06-03 ENCOUNTER — OFFICE VISIT (OUTPATIENT)
Dept: ORTHOPEDIC SURGERY | Facility: CLINIC | Age: 46
End: 2021-06-03

## 2021-06-03 VITALS
HEIGHT: 66 IN | HEART RATE: 68 BPM | DIASTOLIC BLOOD PRESSURE: 72 MMHG | SYSTOLIC BLOOD PRESSURE: 107 MMHG | BODY MASS INDEX: 25.71 KG/M2 | WEIGHT: 160 LBS | TEMPERATURE: 98.2 F

## 2021-06-03 DIAGNOSIS — G89.29 CHRONIC RIGHT-SIDED LOW BACK PAIN, UNSPECIFIED WHETHER SCIATICA PRESENT: ICD-10-CM

## 2021-06-03 DIAGNOSIS — M54.50 CHRONIC RIGHT-SIDED LOW BACK PAIN, UNSPECIFIED WHETHER SCIATICA PRESENT: ICD-10-CM

## 2021-06-03 DIAGNOSIS — M46.1 SACROILIITIS (HCC): ICD-10-CM

## 2021-06-03 DIAGNOSIS — M53.3 NONTRAUMATIC COCCYDYNIA: Primary | ICD-10-CM

## 2021-06-03 DIAGNOSIS — M99.04 SEGMENTAL AND SOMATIC DYSFUNCTION OF SACRAL REGION: ICD-10-CM

## 2021-06-03 PROCEDURE — 99213 OFFICE O/P EST LOW 20 MIN: CPT | Performed by: FAMILY MEDICINE

## 2021-06-03 PROCEDURE — 98927 OSTEOPATH MANJ 5-6 REGIONS: CPT | Performed by: FAMILY MEDICINE

## 2021-06-03 NOTE — PROGRESS NOTES
"Follow Up Visit      Patient: Betty Powers  YOB: 1975  Date of Encounter: 06/03/2021  No ref. provider found     Subjective   Betty Powers is a 45 y.o. female who presents to the office today for Osteopathic Manipulative Treatment for Tailbone Pain      HPI:   HPI  Patient in for follow-up for low back pain, sacroiliitis, and coccydynia.  Did well with the medicines we gave her in the office last time.  Received injections of Depo-Medrol and Toradol and reports that this did decrease her pain somewhat.  Daily naproxen is helping and has been doing her home exercise regimen.  Says the OMT treatment lasted for about 2 days started to wear off.  She would like to do this again.  States that these treatments are all generally helping her low back and SI issues but are not really doing much for the coccyx pain  Allergies:  No Known Allergies    Review of Systems:   Review of Systems   Constitutional: Negative for fever.   Respiratory: Negative for shortness of breath.    Cardiovascular: Negative for chest pain.   Musculoskeletal: Positive for arthralgias and myalgias.   Neurological: Negative for weakness and numbness.       Physical Exam:   Visit Vitals  /72   Pulse 68   Temp 98.2 °F (36.8 °C)   Ht 167.6 cm (66\")   Wt 72.6 kg (160 lb)   LMP 04/07/2018 (Exact Date)   BMI 25.82 kg/m²       Physical Exam  Vitals and nursing note reviewed.   Constitutional:       General: She is not in acute distress.     Appearance: Normal appearance.   Pulmonary:      Effort: Pulmonary effort is normal. No respiratory distress.   Musculoskeletal:      Lumbar back: Spasms present. No tenderness. Decreased range of motion. Negative right straight leg raise test and negative left straight leg raise test.        Back:       Comments: Tender over lower half of sacrum and coccyx.  Tender bilateral SI joints and restricted right side range of motion.     Skin:     General: Skin is warm and dry.      Findings: No erythema. " Follow up RN assessment:      Treatment site: L breast   Treatment completion date: 08/17/2018    MedicalOncologist: Dr. Palm, follow up on 02/04/2021  Aromatase inhibitor: anastrazole  Symptom r/t AI : no symptoms at this time  Surgeon: Dr. Encarnacion, follow up on 02/25/2021  Last Mammogram: site: bilateral diagnostic,  date: 12/27/2019  Next Mammogram: site: bilateral screening, date: 01/2021  Mammogram ordered by Dr. Encarnacion  Skin change including pain or irritation from baseline: no skin changes    Patient concern: no ROM issues    Review of Systems   Constitutional: Negative.    HENT:  Negative.    Eyes: Negative.    Respiratory: Negative.    Cardiovascular: Negative.    Gastrointestinal: Negative.    Endocrine: Negative.    Genitourinary: Negative.     Musculoskeletal: Negative.    Skin: Negative.    Neurological: Negative.    Hematological: Negative.    Psychiatric/Behavioral: Negative.         Meds and allergy: Reviewed and updated    Report off to Dr. Calloway         Neurological:      General: No focal deficit present.      Mental Status: She is alert.      Sensory: No sensory deficit.      Motor: No weakness.       OMT Procedure  Indications: TART findings, muscle spasm, pain    Risks and benefits discussed and patient gave verbal consent to treat    Regions treated: T- Spine, L-Spine, Pelvis, Sacrum and Lower Extremity    Findings: Thoracic Spine Generalized Thoracic Restriction, Lumbar Spine Generalized Lumbar Restriction, L1 ERS R or L2 ERS L, Pelvis Left Posterior Innominate Rotation, Sacrum Right Sacral Restriction or Left Sacral Restriction or Lower Extremity Right Psoas Restriction, Right Piriformis Restriction, Left Psoas Restriction or Left Piriformis Restriction    Modalities: HVLA, Muscle Energy, Direct Myofascial Release and Indirect Myofascial Release    Patient reports decreased pain, improved range of motion, and improved functionality after treatment. There were no adverse effects.    Assessment & Plan:   Assessment/Plan   Diagnoses and all orders for this visit:    1. Nontraumatic coccydynia (Primary)    2. Chronic right-sided low back pain, unspecified whether sciatica present    3. Segmental and somatic dysfunction of sacral region    4. Sacroiliitis (CMS/HCC)        MEDICATION ISSUES:  Discussed medication options and treatment plan of prescribed medication as well as the risks, benefits, and side effects including potential falls, possible impaired driving and metabolic adversities among others. Patient is agreeable to call the office with any worsening of symptoms or onset of side effects. Patient is agreeable to call 911 or go to the nearest ER should he/she begin having SI/HI.     MEDS ORDERED DURING VISIT:  No orders of the defined types were placed in this encounter.  Continue current regimen.  We will try several more OMT treatments.  Consider escalating treatment to physical therapy/pelvic floor therapy.  Consider MRI of the tailbone.  Follow-up  in 1 week           This document has been electronically signed by Brian Ward DO   Laurie 3, 2021 16:22 EDT    Part of this note may be an electronic transcription/translation of spoken language to printed text using the Dragon Dictation System.

## 2021-06-15 ENCOUNTER — OFFICE VISIT (OUTPATIENT)
Dept: ORTHOPEDIC SURGERY | Facility: CLINIC | Age: 46
End: 2021-06-15

## 2021-06-15 VITALS
HEIGHT: 66 IN | WEIGHT: 160 LBS | SYSTOLIC BLOOD PRESSURE: 116 MMHG | DIASTOLIC BLOOD PRESSURE: 69 MMHG | HEART RATE: 71 BPM | BODY MASS INDEX: 25.71 KG/M2

## 2021-06-15 DIAGNOSIS — M46.1 SACROILIITIS (HCC): ICD-10-CM

## 2021-06-15 DIAGNOSIS — M54.50 CHRONIC RIGHT-SIDED LOW BACK PAIN, UNSPECIFIED WHETHER SCIATICA PRESENT: ICD-10-CM

## 2021-06-15 DIAGNOSIS — M99.04 SEGMENTAL AND SOMATIC DYSFUNCTION OF SACRAL REGION: ICD-10-CM

## 2021-06-15 DIAGNOSIS — G89.29 CHRONIC RIGHT-SIDED LOW BACK PAIN, UNSPECIFIED WHETHER SCIATICA PRESENT: ICD-10-CM

## 2021-06-15 DIAGNOSIS — M53.3 NONTRAUMATIC COCCYDYNIA: Primary | ICD-10-CM

## 2021-06-15 PROCEDURE — 98927 OSTEOPATH MANJ 5-6 REGIONS: CPT | Performed by: FAMILY MEDICINE

## 2021-06-15 PROCEDURE — 99213 OFFICE O/P EST LOW 20 MIN: CPT | Performed by: FAMILY MEDICINE

## 2021-06-15 NOTE — PROGRESS NOTES
"Follow Up Visit      Patient: Betty Powers  YOB: 1975  Date of Encounter: 06/15/2021  PCP: Kenya Rangel APRN    Subjective   Betty Powers is a 45 y.o. female who presents to the office today as a follow up for Tailbone Pain      Chief Complaint:   Chief Complaint   Patient presents with   • Tailbone Pain       HPI:   HPI  Patient presents for follow-up for low back pain and coccydynia.  Has now had 3 OMT treatments.  Reports that the low back and SI pain are notably better.  Reports that she has finally started to see some mild improvement in the coccyx pain.  Wants to continue treatment  Allergies:  No Known Allergies    Review of Systems:   Review of Systems   Constitutional: Negative for fever.   Respiratory: Negative for shortness of breath.    Cardiovascular: Negative for chest pain.   Musculoskeletal: Positive for arthralgias, back pain and myalgias.   Neurological: Negative for weakness and numbness.       Visit Vitals  /69   Pulse 71   Ht 167.6 cm (66\")   Wt 72.6 kg (160 lb)   LMP 04/07/2018 (Exact Date)   BMI 25.82 kg/m²       Physical Exam:   Physical Exam  Vitals and nursing note reviewed.   Constitutional:       General: She is not in acute distress.     Appearance: Normal appearance.   Pulmonary:      Effort: Pulmonary effort is normal. No respiratory distress.   Musculoskeletal:      Lumbar back: Spasms present. No tenderness. Decreased range of motion. Negative right straight leg raise test and negative left straight leg raise test.        Back:       Comments: Tender over lower half of sacrum and coccyx.  restricted right side sacral range of motion.     Skin:     General: Skin is warm and dry.      Findings: No erythema.   Neurological:      General: No focal deficit present.      Mental Status: She is alert.      Sensory: No sensory deficit.      Motor: No weakness.       Osteopathic Manipulative Treatment - OMT - Procedure Note    OMT Procedure  Indications: TART findings, muscle " spasm, pain    Risks and benefits discussed and patient gave verbal consent to treat    Regions treated: T- Spine, L-Spine, Pelvis, Sacrum and Lower Extremity    Findings: Thoracic Spine Generalized Thoracic Restriction, T5 ERS R, T8 ERS R, T9 ERS L or T10 ERS L, Lumbar Spine Generalized Lumbar Restriction, L2 ERS L or L4 ERS R, Pelvis Left Posterior Innominate Rotation, Sacrum Right Sacral Restriction or Lower Extremity Right Psoas Restriction, Right Piriformis Restriction, Left Psoas Restriction or Left Piriformis Restriction    Modalities: HVLA, Muscle Energy, Direct Myofascial Release and Indirect Myofascial Release    Patient reports decreased pain, improved range of motion, and improved functionality after treatment. There were no adverse effects.    Assessment & Plan:   Assessment/Plan   Diagnoses and all orders for this visit:    1. Nontraumatic coccydynia (Primary)    2. Chronic right-sided low back pain, unspecified whether sciatica present    3. Segmental and somatic dysfunction of sacral region    4. Sacroiliitis (CMS/[facility])        Visit Diagnoses:    ICD-10-CM ICD-9-CM   1. Nontraumatic coccydynia  M53.3 724.79   2. Chronic right-sided low back pain, unspecified whether sciatica present  M54.5 724.2    G89.29 338.29   3. Segmental and somatic dysfunction of sacral region  M99.04 739.4   4. Sacroiliitis (CMS/HCC)  M46.1 720.2       MEDICATION ISSUES:  Discussed medication options and treatment plan of prescribed medication as well as the risks, benefits, and side effects including potential falls, possible impaired driving and metabolic adversities among others. Patient is agreeable to call the office with any worsening of symptoms or onset of side effects. Patient is agreeable to call 911 or go to the nearest ER should he/she begin having SI/HI.     MEDS ORDERED DURING VISIT:  No orders of the defined types were placed in this encounter.  Follow-up in 1 week.  If patient continues to see improvement in  coccydynia we will continue current regimen.  If patient does not have any significant change would consider referral for pelvic floor therapy and advanced imaging of the sacrum/coccyx.  Follow-up in 1 week           This document has been electronically signed by Brian Ward DO   Laurie 15, 2021 16:33 EDT    Part of this note may be an electronic transcription/translation of spoken language to printed text using the Dragon Dictation System.

## 2021-06-22 ENCOUNTER — OFFICE VISIT (OUTPATIENT)
Dept: ORTHOPEDIC SURGERY | Facility: CLINIC | Age: 46
End: 2021-06-22

## 2021-06-22 VITALS
HEIGHT: 66 IN | DIASTOLIC BLOOD PRESSURE: 73 MMHG | HEART RATE: 69 BPM | SYSTOLIC BLOOD PRESSURE: 125 MMHG | BODY MASS INDEX: 25.71 KG/M2 | WEIGHT: 160 LBS

## 2021-06-22 DIAGNOSIS — M46.1 SACROILIITIS (HCC): ICD-10-CM

## 2021-06-22 DIAGNOSIS — M53.3 NONTRAUMATIC COCCYDYNIA: Primary | ICD-10-CM

## 2021-06-22 DIAGNOSIS — M54.50 CHRONIC RIGHT-SIDED LOW BACK PAIN, UNSPECIFIED WHETHER SCIATICA PRESENT: ICD-10-CM

## 2021-06-22 DIAGNOSIS — G89.29 CHRONIC RIGHT-SIDED LOW BACK PAIN, UNSPECIFIED WHETHER SCIATICA PRESENT: ICD-10-CM

## 2021-06-22 DIAGNOSIS — M99.04 SEGMENTAL AND SOMATIC DYSFUNCTION OF SACRAL REGION: ICD-10-CM

## 2021-06-22 PROCEDURE — 99213 OFFICE O/P EST LOW 20 MIN: CPT | Performed by: FAMILY MEDICINE

## 2021-06-22 PROCEDURE — 98927 OSTEOPATH MANJ 5-6 REGIONS: CPT | Performed by: FAMILY MEDICINE

## 2021-06-22 NOTE — PROGRESS NOTES
"Follow Up Visit      Patient: Betty Powers  YOB: 1975  Date of Encounter: 06/22/2021  No ref. provider found     Subjective   Betty Powers is a 45 y.o. female who presents to the office today for Osteopathic Manipulative Treatment for Tailbone Pain      HPI:   HPI  Patient presents for follow-up for chronic tailbone coccydynia.  Has been getting OMT treatments weekly for the last month.  Reports significant improvement in general low back pain although is getting some pain in the right SI today.  Reports 25% improvement in tailbone pain.  This is a significant improvement as patient was previously having difficulty just sitting.  Wants to continue with regimen.  Is interested in doing therapy for this as well.  Allergies:  No Known Allergies    Review of Systems:   Review of Systems   Constitutional: Negative for fever.   Respiratory: Negative for shortness of breath.    Cardiovascular: Negative for chest pain.   Gastrointestinal: Negative.    Genitourinary: Negative.    Musculoskeletal: Positive for arthralgias and myalgias. Negative for back pain.   Neurological: Negative for weakness and numbness.       Physical Exam:   Visit Vitals  /73   Pulse 69   Ht 167.6 cm (66\")   Wt 72.6 kg (160 lb)   LMP 04/07/2018 (Exact Date)   BMI 25.82 kg/m²       Physical Exam  Vitals and nursing note reviewed.   Constitutional:       General: She is not in acute distress.     Appearance: Normal appearance.   Pulmonary:      Effort: Pulmonary effort is normal. No respiratory distress.   Musculoskeletal:      Lumbar back: Spasms present. No tenderness. Decreased range of motion. Negative right straight leg raise test and negative left straight leg raise test.        Back:       Comments: Tender over right SI and coccyx.  restricted right side sacral range of motion.     Skin:     General: Skin is warm and dry.      Findings: No erythema.   Neurological:      General: No focal deficit present.      Mental Status: She " is alert.      Sensory: No sensory deficit.      Motor: No weakness.     OMT Procedure  Indications: TART findings, muscle spasm, pain    Risks and benefits discussed and patient gave verbal consent to treat    Regions treated: T- Spine, L-Spine, Pelvis, Sacrum and Lower Extremity    Findings: Thoracic Spine Generalized Thoracic Restriction or T4 ERS R, Lumbar Spine Generalized Lumbar Restriction, L1 ERS L, L3 ERS R or L4 ERS R, Pelvis Left Posterior Innominate Rotation, Sacrum Right Sacral Restriction or Lower Extremity Right Psoas Restriction, Right Piriformis Restriction, Left Psoas Restriction or Left Piriformis Restriction    Modalities: HVLA, Muscle Energy, Direct Myofascial Release and Indirect Myofascial Release    Patient reports decreased pain, improved range of motion, and improved functionality after treatment. There were no adverse effects.    Assessment & Plan:   Assessment/Plan   Diagnoses and all orders for this visit:    1. Nontraumatic coccydynia (Primary)  -     Ambulatory Referral to Physical Therapy Evaluate and treat    2. Chronic right-sided low back pain, unspecified whether sciatica present  -     Ambulatory Referral to Physical Therapy Evaluate and treat    3. Segmental and somatic dysfunction of sacral region    4. Sacroiliitis (CMS/HCC)  -     Ambulatory Referral to Physical Therapy Evaluate and treat      Patient notably improved after treatment.  Referred to PT for pelvic therapy/coccydynia.  Follow-up in 1 week for further OMT.  If continues to improve will space out to 2 weeks after that.           This document has been electronically signed by Brian Ward DO   June 22, 2021 14:10 EDT    Part of this note may be an electronic transcription/translation of spoken language to printed text using the Dragon Dictation System.

## 2021-06-29 ENCOUNTER — OFFICE VISIT (OUTPATIENT)
Dept: ORTHOPEDIC SURGERY | Facility: CLINIC | Age: 46
End: 2021-06-29

## 2021-06-29 VITALS
HEART RATE: 67 BPM | BODY MASS INDEX: 25.71 KG/M2 | WEIGHT: 160 LBS | SYSTOLIC BLOOD PRESSURE: 111 MMHG | HEIGHT: 66 IN | DIASTOLIC BLOOD PRESSURE: 71 MMHG

## 2021-06-29 DIAGNOSIS — M53.3 NONTRAUMATIC COCCYDYNIA: Primary | ICD-10-CM

## 2021-06-29 DIAGNOSIS — M54.2 NECK PAIN: ICD-10-CM

## 2021-06-29 DIAGNOSIS — M54.50 CHRONIC RIGHT-SIDED LOW BACK PAIN, UNSPECIFIED WHETHER SCIATICA PRESENT: ICD-10-CM

## 2021-06-29 DIAGNOSIS — M46.1 SACROILIITIS (HCC): ICD-10-CM

## 2021-06-29 DIAGNOSIS — M99.04 SEGMENTAL AND SOMATIC DYSFUNCTION OF SACRAL REGION: ICD-10-CM

## 2021-06-29 DIAGNOSIS — G89.29 CHRONIC RIGHT-SIDED LOW BACK PAIN, UNSPECIFIED WHETHER SCIATICA PRESENT: ICD-10-CM

## 2021-06-29 DIAGNOSIS — M62.838 MUSCLE SPASMS OF NECK: ICD-10-CM

## 2021-06-29 PROCEDURE — 99213 OFFICE O/P EST LOW 20 MIN: CPT | Performed by: FAMILY MEDICINE

## 2021-06-29 PROCEDURE — 98929 OSTEOPATH MANJ 9-10 REGIONS: CPT | Performed by: FAMILY MEDICINE

## 2021-06-29 NOTE — PROGRESS NOTES
"Follow Up Visit      Patient: Betty Powers  YOB: 1975  Date of Encounter: 06/29/2021  No ref. provider found     Subjective   Betty Powers is a 45 y.o. female who presents to the office today for Osteopathic Manipulative Treatment for Tailbone Pain (OMT)      HPI:   HPI  Patient presents for follow-up for coccydynia and sacroiliitis.  Has been getting OMT regularly which seems to be helping.  Due to start PT/pelvic floor therapy in 3 days.  Reports that coccyx pain is about 30% improved since we started working on her and continuing to get better.  Would like to continue treatment.  Also woke up with some neck soreness this morning.  No injury or radiating pain or neurologic symptoms..  Allergies:  No Known Allergies    Review of Systems:   Review of Systems   Constitutional: Negative for fever.   Respiratory: Negative for shortness of breath.    Cardiovascular: Negative for chest pain.   Musculoskeletal: Positive for arthralgias, myalgias, neck pain and neck stiffness.   Neurological: Negative for weakness and numbness.       Physical Exam:   Visit Vitals  /71   Pulse 67   Ht 167.6 cm (66\")   Wt 72.6 kg (160 lb)   LMP 04/07/2018 (Exact Date)   BMI 25.82 kg/m²       Physical Exam  Vitals and nursing note reviewed.   Constitutional:       General: She is not in acute distress.     Appearance: Normal appearance.   Pulmonary:      Effort: Pulmonary effort is normal. No respiratory distress.   Musculoskeletal:      Cervical back: Spasms and tenderness present. No bony tenderness. Pain with movement present. Decreased range of motion.      Lumbar back: Spasms present. No tenderness. Decreased range of motion. Negative right straight leg raise test and negative left straight leg raise test.        Back:       Comments: Tender over right SI and coccyx.  restricted right side sacral range of motion.      Tender suboccipital musculature   Skin:     General: Skin is warm and dry.      Findings: No erythema. "   Neurological:      General: No focal deficit present.      Mental Status: She is alert.      Sensory: No sensory deficit.      Motor: No weakness.         OMT Procedure  Indications: TART findings, muscle spasm, pain    Risks and benefits discussed and patient gave verbal consent to treat    Regions treated: Head, C-Spine, Ribs, Upper Extremity, T- Spine, L-Spine, Pelvis, Sacrum and Lower Extremity    Findings: Head Suboccipital restriction, Cervical Spine Generalized cervical motion restriction, AARL, C3 ERS R, C4 ERS R or C5 ERS L, Upper Extremity Right shoulder Restriction, Ribs Thoracic Inlet Restriction, Thoracic Spine Generalized Thoracic Restriction, T5 ERS R or T7 ERS L, Lumbar Spine Generalized Lumbar Restriction or L3 ERS L, Pelvis Left Posterior Innominate Rotation, Sacrum Right Sacral Restriction or Lower Extremity Right Psoas Restriction, Right Piriformis Restriction, Left Psoas Restriction or Left Piriformis Restriction    Modalities: HVLA, Muscle Energy, Direct Myofascial Release and Indirect Myofascial Release    Patient reports decreased pain, improved range of motion, and improved functionality after treatment. There were no adverse effects.    Assessment & Plan:   Assessment/Plan   There are no diagnoses linked to this encounter.    MEDICATION ISSUES:  Discussed medication options and treatment plan of prescribed medication as well as the risks, benefits, and side effects including potential falls, possible impaired driving and metabolic adversities among others. Patient is agreeable to call the office with any worsening of symptoms or onset of side effects. Patient is agreeable to call 911 or go to the nearest ER should he/she begin having SI/HI.     MEDS ORDERED DURING VISIT:  No orders of the defined types were placed in this encounter.             This document has been electronically signed by Brian Ward DO   June 29, 2021 11:49 EDT    Part of this note may be an electronic  transcription/translation of spoken language to printed text using the Dragon Dictation System.

## 2021-07-21 ENCOUNTER — TREATMENT (OUTPATIENT)
Dept: PHYSICAL THERAPY | Facility: CLINIC | Age: 46
End: 2021-07-21

## 2021-07-21 DIAGNOSIS — M53.3 COCCYDYNIA: Primary | ICD-10-CM

## 2021-07-21 DIAGNOSIS — M54.9 BACK PAIN WITH SCIATICA: ICD-10-CM

## 2021-07-21 DIAGNOSIS — M54.30 BACK PAIN WITH SCIATICA: ICD-10-CM

## 2021-07-21 PROCEDURE — 97162 PT EVAL MOD COMPLEX 30 MIN: CPT | Performed by: PHYSICAL THERAPIST

## 2021-07-21 NOTE — PROGRESS NOTES
Physical Therapy Initial Evaluation and Plan of Care        Patient: Betty Powers   : 1975  Diagnosis/ICD-10 Code:  Coccydynia [M53.3]  Referring practitioner: Brian Ward DO  Date of Initial Visit: 2021  Today's Date: 2021  Patient seen for 1 sessions    Visit Diagnoses:    ICD-10-CM ICD-9-CM   1. Coccydynia  M53.3 724.79   2. Back pain with sciatica  M54.9 724.3    M54.30             Subjective Questionnaire:       Subjective Evaluation    History of Present Illness  Onset date: 2020.  Mechanism of injury: Pt has suffered from low back and coccyx pain since 2020 with no known cause. The patient robison report caring for her disabled son.  The patient was evaluated by her PCP and was ordered an x-ray that demonstrated no abnormalities.  The patient was referred to MD Ward and she has been receiving treatments twice a week for the last two months with good response.  The patient has now been referred to therapy for treatment of current symptoms.      Patient Occupation: unemployed Quality of life: excellent    Pain  Current pain ratin  At best pain rating: 3  At worst pain rating: 10  Location: left PSIS greater than right  Quality: dull ache  Relieving factors: medications, heat, rest and change in position  Aggravating factors: prolonged positioning (sitting)  Progression: no change    Hand dominance: right    Diagnostic Tests  X-ray: normal    Patient Goals  Patient goals for therapy: decreased pain, increased motion, increased strength, independence with ADLs/IADLs and return to sport/leisure activities             Objective          Postural Observations    Additional Postural Observation Details  Mild fwd hed and slumped posture  symmetrical stance  Pt alternates sitting on left and right hip      Palpation     Additional Palpation Details  L5-S1 tenderness and left piriformis tenderness    Neurological Testing     Sensation     Lumbar   Left   Intact: light  touch    Right   Intact: light touch    Reflexes   Left   Patellar (L4): normal (2+)    Right   Patellar (L4): normal (2+)    Active Range of Motion     Lumbar   Flexion: WFL  Extension: WFL  Left rotation: WFL  Right rotation: WFL    Strength/Myotome Testing     Left Hip   Planes of Motion   Flexion: 4+    Right Hip   Planes of Motion   Flexion: 4+    Left Knee   Flexion: 4+  Extension: 4+    Right Knee   Flexion: 4+  Extension: 4+    Tests     Additional Tests Details  Positive left slump test  Repeated flexion increased right leg pain          Assessment & Plan     Assessment  Impairments: abnormal muscle firing, abnormal or restricted ROM, activity intolerance, impaired physical strength, lacks appropriate home exercise program and pain with function  Assessment details: Pt is a 44 y/o female referred to therapy for treatment of back pain and coccydynia.  Pt presents with symptoms consistent with a posterior disc derangement. Pt demonstrated a directional preference for extension and therapy will follow with a Lady program for decreased radicular symptoms.  Prognosis: good  Functional Limitations: pulling, pushing, uncomfortable because of pain, sitting and unable to perform repetitive tasks  Goals  Plan Goals: STG 4 weeks    1 Pt will be instructed in a HEP.  2 Pt will report pain no greater than 4/10 with sitting and ADLs.  3 Pt will report a 25% decrease in radicular symptoms.    LTG 8 weeks    1 Pt will improve her Mod Oswestry score to less than 20%.  2 Pt will report a 75% decrease in radicular symptoms.  3 Pt will be able to sit for 30 min without increased pain.    Plan  Therapy options: will be seen for skilled physical therapy services  Planned modality interventions: cryotherapy, dry needling, TENS, thermotherapy (hydrocollator packs), traction and ultrasound  Planned therapy interventions: ADL retraining, balance/weight-bearing training, body mechanics training, fine motor coordination training,  functional ROM exercises, home exercise program, IADL retraining, joint mobilization, manual therapy, neuromuscular re-education, postural training, soft tissue mobilization, strengthening, stretching and therapeutic activities  Duration in visits: 16  Duration in weeks: 8  Treatment plan discussed with: patient  Plan details: Will follow for optimal gains  Moderate Evaluation  37710  Re-evaluation   66844  Therapeutic exercise  39557  Therapeutic activity    00379  Neuromuscular re-education   41275  Manual therapy   11498  Gait training  40658  Unattended e-stim (Medicaid/Medicare)     Moist heat/cryotherapy 36774   Ultrasound   52999  Mechanical traction 80687          Visit Diagnoses:    ICD-10-CM ICD-9-CM   1. Coccydynia  M53.3 724.79   2. Back pain with sciatica  M54.9 724.3    M54.30        Timed:  Manual Therapy:         mins  22983;  Therapeutic Exercise:         mins  86179;     Neuromuscular Camilo:        mins  46749;    Therapeutic Activity:          mins  43321;     Gait Training:           mins  16458;     Ultrasound:          mins  24231;    Electrical Stimulation:         mins  95007 ( );    Untimed:  Electrical Stimulation:         mins  19425 ( );  Mechanical Traction:         mins  02203;     Timed Treatment:      mins   Total Treatment:        mins    PT SIGNATURE: [facility] PT         Initial Certification  Certification Period: 7/21/2021 thru 10/19/2021  I certify that the therapy services are furnished while this patient is under my care.  The services outlined above are required by this patient, and will be reviewed every 90 days.     PHYSICIAN: Brian Ward,       DATE:     Please sign and return via fax to [email or web address] . Thank you, [facility].

## 2021-07-22 ENCOUNTER — OFFICE VISIT (OUTPATIENT)
Dept: ORTHOPEDIC SURGERY | Facility: CLINIC | Age: 46
End: 2021-07-22

## 2021-07-22 VITALS
HEART RATE: 74 BPM | BODY MASS INDEX: 24.11 KG/M2 | HEIGHT: 66 IN | DIASTOLIC BLOOD PRESSURE: 78 MMHG | WEIGHT: 150 LBS | SYSTOLIC BLOOD PRESSURE: 123 MMHG

## 2021-07-22 DIAGNOSIS — M62.838 MUSCLE SPASMS OF NECK: ICD-10-CM

## 2021-07-22 DIAGNOSIS — G89.29 CHRONIC RIGHT-SIDED LOW BACK PAIN, UNSPECIFIED WHETHER SCIATICA PRESENT: ICD-10-CM

## 2021-07-22 DIAGNOSIS — M54.2 NECK PAIN: ICD-10-CM

## 2021-07-22 DIAGNOSIS — M54.50 CHRONIC RIGHT-SIDED LOW BACK PAIN, UNSPECIFIED WHETHER SCIATICA PRESENT: ICD-10-CM

## 2021-07-22 DIAGNOSIS — M46.1 SACROILIITIS (HCC): ICD-10-CM

## 2021-07-22 DIAGNOSIS — M53.3 NONTRAUMATIC COCCYDYNIA: Primary | ICD-10-CM

## 2021-07-22 DIAGNOSIS — M99.04 SEGMENTAL AND SOMATIC DYSFUNCTION OF SACRAL REGION: ICD-10-CM

## 2021-07-22 PROCEDURE — 99213 OFFICE O/P EST LOW 20 MIN: CPT | Performed by: FAMILY MEDICINE

## 2021-07-22 PROCEDURE — 98929 OSTEOPATH MANJ 9-10 REGIONS: CPT | Performed by: FAMILY MEDICINE

## 2021-07-22 NOTE — PROGRESS NOTES
"Follow Up Visit      Patient: Betty Powers  YOB: 1975  Date of Encounter: 07/22/2021  PCP: Kenya Rangel APRN    Subjective   Betty Powers is a 45 y.o. female who presents to the office today as a follow up for Tailbone Pain and OMT      Chief Complaint:   Chief Complaint   Patient presents with   • Tailbone Pain   • OMT       HPI:   HPI  Patient presents for follow-up for coccydynia and sacroiliitis.  It has been 3 weeks since her last OMT treatment and she is starting to flare back up.  Treatments every 2 weeks seems to be the sweet spot.  She is also due to start PT/pelvic floor therapy next week.  Patient also complains of continued spasms and tightness on the right side of her neck.  Allergies:  No Known Allergies    Review of Systems:   Review of Systems   Constitutional: Negative for fever.   Respiratory: Negative for shortness of breath.    Cardiovascular: Negative for chest pain.   Musculoskeletal: Positive for arthralgias, back pain, myalgias, neck pain and neck stiffness.   Neurological: Negative for weakness and numbness.       Visit Vitals  /78   Pulse 74   Ht 167.6 cm (66\")   Wt 68 kg (150 lb)   LMP 04/07/2018 (Exact Date)   BMI 24.21 kg/m²       Physical Exam:   Physical Exam  Vitals and nursing note reviewed.   Constitutional:       General: She is not in acute distress.     Appearance: Normal appearance.   Pulmonary:      Effort: Pulmonary effort is normal. No respiratory distress.   Musculoskeletal:      Cervical back: Spinous process tenderness and muscular tenderness present. No pain with movement. Decreased range of motion.      Lumbar back: Spasms and tenderness present. No bony tenderness.      Comments: Tender left SI and coccyx   Skin:     General: Skin is warm and dry.      Findings: No erythema.   Neurological:      General: No focal deficit present.      Mental Status: She is alert.      Sensory: No sensory deficit.      Motor: No weakness.       Osteopathic Manipulative " Treatment - OMT - Procedure Note    OMT Procedure  Indications: TART findings, muscle spasm, pain    Risks and benefits discussed and patient gave verbal consent to treat    Regions treated: Head, C-Spine, Ribs, Upper Extremity, T- Spine, L-Spine, Pelvis, Sacrum and Lower Extremity    Findings: Head Suboccipital restriction, Cervical Spine Generalized cervical motion restriction, AARL, C3 FRS R, C4 ERS R or C6 ERS R, Upper Extremity Right shoulder Restriction, Ribs Thoracic Inlet Restriction, Thoracic Spine Generalized Thoracic Restriction, T7 ERS R or T8 ERS R, Lumbar Spine Generalized Lumbar Restriction, L4 ERS R or L5 ERS L, Pelvis Right Posterior Innominate Rotation, Sacrum Right Sacral Restriction or Lower Extremity Right Psoas Restriction    Modalities: HVLA, Muscle Energy, Direct Myofascial Release, Indirect Myofascial Release and Still Technique    Patient reports decreased pain, improved range of motion, and improved functionality after treatment. There were no adverse effects.          Assessment & Plan:   Assessment/Plan   Diagnoses and all orders for this visit:    1. Nontraumatic coccydynia (Primary)    2. Sacroiliitis (CMS/HCC)    3. Segmental and somatic dysfunction of sacral region    4. Chronic right-sided low back pain, unspecified whether sciatica present    5. Neck pain    6. Muscle spasms of neck        Visit Diagnoses:    ICD-10-CM ICD-9-CM   1. Nontraumatic coccydynia  M53.3 724.79   2. Sacroiliitis (CMS/HCC)  M46.1 720.2   3. Segmental and somatic dysfunction of sacral region  M99.04 739.4   4. Chronic right-sided low back pain, unspecified whether sciatica present  M54.5 724.2    G89.29 338.29   5. Neck pain  M54.2 723.1   6. Muscle spasms of neck  M62.838 728.85       MEDICATION ISSUES:  Discussed medication options and treatment plan of prescribed medication as well as the risks, benefits, and side effects including potential falls, possible impaired driving and metabolic adversities among  others. Patient is agreeable to call the office with any worsening of symptoms or onset of side effects. Patient is agreeable to call 911 or go to the nearest ER should he/she begin having SI/HI.     MEDS ORDERED DURING VISIT:  No orders of the defined types were placed in this encounter.  Notably improved neck and tailbone pain after OMT treatment.  Patient will start pelvic floor therapy next week as scheduled.  It would be interesting to see how she responds to this follow-up in 2 weeks.  If patient continues to have issues after therapy I would consider an MRI of the pelvis.           This document has been electronically signed by Brian Ward DO   July 22, 2021 15:44 EDT    Part of this note may be an electronic transcription/translation of spoken language to printed text using the Dragon Dictation System.

## 2021-07-26 ENCOUNTER — TREATMENT (OUTPATIENT)
Dept: PHYSICAL THERAPY | Facility: CLINIC | Age: 46
End: 2021-07-26

## 2021-07-26 DIAGNOSIS — M54.30 BACK PAIN WITH SCIATICA: ICD-10-CM

## 2021-07-26 DIAGNOSIS — M53.3 COCCYDYNIA: Primary | ICD-10-CM

## 2021-07-26 DIAGNOSIS — M54.9 BACK PAIN WITH SCIATICA: ICD-10-CM

## 2021-07-26 PROCEDURE — 97110 THERAPEUTIC EXERCISES: CPT | Performed by: PHYSICAL THERAPIST

## 2021-07-26 PROCEDURE — 97014 ELECTRIC STIMULATION THERAPY: CPT | Performed by: PHYSICAL THERAPIST

## 2021-07-26 PROCEDURE — 97140 MANUAL THERAPY 1/> REGIONS: CPT | Performed by: PHYSICAL THERAPIST

## 2021-07-26 NOTE — PROGRESS NOTES
"   Physical Therapy Daily Treatment Note      Patient: Betty Powers   : 1975  Referring practitioner: Brian Ward DO  Date of Initial Visit: Type: THERAPY  Noted: 2021  Today's Date: 2021  Patient seen for 2 sessions           Subjective Evaluation    History of Present Illness    Subjective comment: Pt reported 4-5/10 low back and hip pain prior to today's session. She stated she has been manipulated once every week or two by Dr. Ozuna, stating that it has been beneficial. She states she has to \"pull and tug a lot with my special needs son, so my hips hurt real bad a few days ago\".Pain  Current pain ratin           Objective   See Exercise, Manual, and Modality Logs for complete treatment.       Assessment & Plan     Assessment  Assessment details: Today's session was initiated with therapeutic exercises in the seated and supine positions to address bilateral lower extremity strength, core stability, and gluteal muscle tightness. STM was then performed on bilateral lumbar paraspinals in the right side-lying position, with tenderness reported. The session concluded with moist heat combined with pre-mod to bilateral lumbar paraspinals for pain relief and muscle tightness, with no skin irritation observed. The patient reported 4/10 low back pain upon conclusion of today's treatment.    Plan  Plan details: Progress strengthening and stability exercises as tolerated for improved functional movement.        Visit Diagnoses:    ICD-10-CM ICD-9-CM   1. Coccydynia  M53.3 724.79   2. Back pain with sciatica  M54.9 724.3    M54.30        Progress per Plan of Care           Timed:  Manual Therapy:   16    mins  47461;  Therapeutic Exercise:   24   mins  35647;     Neuromuscular Camilo:        mins  34842;    Therapeutic Activity:          mins  35085;     Gait Training:           mins  43359;     Ultrasound:          mins  19947;    Electrical Stimulation:         mins  58467 (MC " );    Untimed:  Electrical Stimulation:   10    mins  78207 ( );  Mechanical Traction:         mins  61603;     Timed Treatment:   40    mins   Total Treatment:     50   mins    Ashley Claudene Dalton, PT  Physical Therapist

## 2021-07-28 ENCOUNTER — TREATMENT (OUTPATIENT)
Dept: PHYSICAL THERAPY | Facility: CLINIC | Age: 46
End: 2021-07-28

## 2021-07-28 DIAGNOSIS — M54.9 BACK PAIN WITH SCIATICA: ICD-10-CM

## 2021-07-28 DIAGNOSIS — M54.30 BACK PAIN WITH SCIATICA: ICD-10-CM

## 2021-07-28 DIAGNOSIS — M53.3 COCCYDYNIA: Primary | ICD-10-CM

## 2021-07-28 PROCEDURE — 97014 ELECTRIC STIMULATION THERAPY: CPT | Performed by: PHYSICAL THERAPIST

## 2021-07-28 PROCEDURE — 97140 MANUAL THERAPY 1/> REGIONS: CPT | Performed by: PHYSICAL THERAPIST

## 2021-07-28 PROCEDURE — 97110 THERAPEUTIC EXERCISES: CPT | Performed by: PHYSICAL THERAPIST

## 2021-07-28 NOTE — PROGRESS NOTES
"   Physical Therapy Daily Treatment Note      Patient: Betty Powers   : 1975  Referring practitioner: Brian Ward DO  Date of Initial Visit: Type: THERAPY  Noted: 2021  Today's Date: 2021  Patient seen for 3 sessions           Subjective Evaluation    History of Present Illness    Subjective comment: Pt reports 4/10 low back and pain in both hips prior to today's session. She states she was \"sore but not too bad\" following her last session.Pain  Current pain ratin           Objective   See Exercise, Manual, and Modality Logs for complete treatment.       Assessment & Plan     Assessment  Assessment details: Today's session was initiated with therapeutic exercises to address impaired lumbar ROM, strength, and muscle tightness. The session was progressed to include one pound ankle weights, with fatigue but no increase in pain reported. STM was performed on bilateral lumbar paraspinals, with patient in the right side-lying position with a pillow between her legs, to address muscle guarding. Moist heat combined with pre-mod was applied to the lumbar region for pain relief and muscle relaxation. No skin irritation was observed following modalities.Pt reported 4/10 lumbar pain following today's session.    Plan  Plan details: Progress to standing activities as tolerated for improved functional mobility.        Visit Diagnoses:    ICD-10-CM ICD-9-CM   1. Coccydynia  M53.3 724.79   2. Back pain with sciatica  M54.9 724.3    M54.30        Progress per Plan of Care           Timed:  Manual Therapy:    12     mins  33591;  Therapeutic Exercise:    28     mins  43412;     Neuromuscular Camilo:        mins  17841;    Therapeutic Activity:          mins  62944;     Gait Training:           mins  07222;     Ultrasound:          mins  77366;    Electrical Stimulation:         mins  77147 ( );    Untimed:  Electrical Stimulation:    10     mins  68168 ( );  Mechanical Traction:         mins  " 09984;     Timed Treatment:   40   mins   Total Treatment:     50   mins  Ashley Claudene Dalton, PT  Physical Therapist

## 2021-08-02 ENCOUNTER — TREATMENT (OUTPATIENT)
Dept: PHYSICAL THERAPY | Facility: CLINIC | Age: 46
End: 2021-08-02

## 2021-08-02 DIAGNOSIS — M53.3 COCCYDYNIA: Primary | ICD-10-CM

## 2021-08-02 DIAGNOSIS — M54.30 BACK PAIN WITH SCIATICA: ICD-10-CM

## 2021-08-02 DIAGNOSIS — M54.9 BACK PAIN WITH SCIATICA: ICD-10-CM

## 2021-08-02 PROCEDURE — 97140 MANUAL THERAPY 1/> REGIONS: CPT | Performed by: PHYSICAL THERAPIST

## 2021-08-02 PROCEDURE — 97014 ELECTRIC STIMULATION THERAPY: CPT | Performed by: PHYSICAL THERAPIST

## 2021-08-02 PROCEDURE — 97110 THERAPEUTIC EXERCISES: CPT | Performed by: PHYSICAL THERAPIST

## 2021-08-02 NOTE — PROGRESS NOTES
Physical Therapy Daily Treatment Note      Patient: Betty Powers   : 1975  Referring practitioner: Brian Ward DO  Date of Initial Visit: Type: THERAPY  Noted: 2021  Today's Date: 2021  Patient seen for 4 sessions         Subjective:   Patient arrives to therapy w/ reports of 4/10 low back, and coccyx pain.  Pt verbalizes compliance of continuation of home program, and reports good tolerance to therapy.     Objective   See Exercise, Manual, and Modality Logs for complete treatment.       Assessment/Plan:  Patient responded well to today's session w/ reports of slight decrease in pain following, 10.  Treatment initiated w/ therex as listed w/ focus on extension activities, improved lumbar/ core stability and postural awareness, as tolerated.  Therex progressed w/ tband postural strengthening exercise initiated.  Pt provided w/ cues and demonstration for proper form and for max benefit.  Manual rx performed including PA glides and STM to lumbar region to address tightness observed, and assist w/ improved mobility.  Cryotherapy w/ Estim applied at conclusion w/ pt in prone.  Pt continue to benefit from therapy services and will be progressed as tolerated.  Continue w/ PT's POC.    Visit Diagnoses:    ICD-10-CM ICD-9-CM   1. Coccydynia  M53.3 724.79   2. Back pain with sciatica  M54.9 724.3    M54.30        Progress per Plan of Care and Progress strengthening /stabilization /functional activity           Timed:  Manual Therapy:    14     mins  46676;  Therapeutic Exercise:     33    mins  42316;     Neuromuscular Camilo:        mins  96034;    Therapeutic Activity:          mins  34031;     Gait Training:           mins  38620;     Ultrasound:          mins  15628;    Electrical Stimulation:         mins  48931 ( );    Untimed:  Electrical Stimulation:    10     mins  68548 ( );  Mechanical Traction:         mins  05796;     Timed Treatment:   47   mins   Total Treatment:    57     jarocho Frey. Steffi, MERYL  Physical Therapist

## 2021-08-04 ENCOUNTER — TREATMENT (OUTPATIENT)
Dept: PHYSICAL THERAPY | Facility: CLINIC | Age: 46
End: 2021-08-04

## 2021-08-04 DIAGNOSIS — M54.9 BACK PAIN WITH SCIATICA: ICD-10-CM

## 2021-08-04 DIAGNOSIS — M54.30 BACK PAIN WITH SCIATICA: ICD-10-CM

## 2021-08-04 DIAGNOSIS — M53.3 COCCYDYNIA: Primary | ICD-10-CM

## 2021-08-04 PROCEDURE — 97140 MANUAL THERAPY 1/> REGIONS: CPT | Performed by: PHYSICAL THERAPIST

## 2021-08-04 PROCEDURE — 97110 THERAPEUTIC EXERCISES: CPT | Performed by: PHYSICAL THERAPIST

## 2021-08-04 PROCEDURE — 97014 ELECTRIC STIMULATION THERAPY: CPT | Performed by: PHYSICAL THERAPIST

## 2021-08-04 NOTE — PROGRESS NOTES
Physical Therapy Daily Treatment Note      Patient: Betty Powers   : 1975  Referring practitioner: Brian Ward DO  Date of Initial Visit: Type: THERAPY  Noted: 2021  Today's Date: 2021  Patient seen for 5 sessions         Subjective:  Patient reports 4/10 pain upon arrival to therapy.  Pt states she feels she has improved since beginning therapy, because her pain is not as constant as it was.      Objective   See Exercise, Manual, and Modality Logs for complete treatment.       Assessment/Plan:  Patient responded well to today's session w/ reports of decreased pain following, 2/10.  Pt completed therex as listed w/ continued focus on improved lumbar ROM, improved lumbar stability, and reduced radicular symptoms.  Exercise progressed w/ step ups and standing hip extension initiated.  Pt observed w/ good tolerance, and was provided w/ cues and demonstration for good form, and for max benefit.  Manual rx performed including PA glides and STM to lumbar, lumbar paraspinals f/b conclusion of Estim w/ cryotherapy.  Pt demonstrated increased muscular tightness in lower lumbar area, and limited mobility w/ glides.  Pt continues to benefit from therapy services and will be progressed as tolerated.  Continue w/ PT's POC.     Visit Diagnoses:    ICD-10-CM ICD-9-CM   1. Coccydynia  M53.3 724.79   2. Back pain with sciatica  M54.9 724.3    M54.30        Progress per Plan of Care and Progress strengthening /stabilization /functional activity           Timed:  Manual Therapy:    15     mins  59874;  Therapeutic Exercise:   32      mins  40641;     Neuromuscular Camilo:        mins  92227;    Therapeutic Activity:          mins  08245;     Gait Training:           mins  53511;     Ultrasound:          mins  79890;    Electrical Stimulation:         mins  57142 ( );    Untimed:  Electrical Stimulation:    10     mins  30249 ( );  Mechanical Traction:         mins  24839;     Timed Treatment:   47    mins   Total Treatment:    57    mins  Alise Frey. Steffi, MERYL  Physical Therapist

## 2021-08-11 ENCOUNTER — TREATMENT (OUTPATIENT)
Dept: PHYSICAL THERAPY | Facility: CLINIC | Age: 46
End: 2021-08-11

## 2021-08-11 DIAGNOSIS — M54.30 BACK PAIN WITH SCIATICA: ICD-10-CM

## 2021-08-11 DIAGNOSIS — M53.3 COCCYDYNIA: Primary | ICD-10-CM

## 2021-08-11 DIAGNOSIS — M54.9 BACK PAIN WITH SCIATICA: ICD-10-CM

## 2021-08-11 PROCEDURE — 97110 THERAPEUTIC EXERCISES: CPT | Performed by: PHYSICAL THERAPIST

## 2021-08-11 PROCEDURE — 97014 ELECTRIC STIMULATION THERAPY: CPT | Performed by: PHYSICAL THERAPIST

## 2021-08-11 PROCEDURE — 97140 MANUAL THERAPY 1/> REGIONS: CPT | Performed by: PHYSICAL THERAPIST

## 2021-08-11 NOTE — PROGRESS NOTES
Physical Therapy Daily Treatment Note      Patient: Betty Powers   : 1975  Referring practitioner: Brian Ward DO  Date of Initial Visit: Type: THERAPY  Noted: 2021  Today's Date: 2021  Patient seen for 6 sessions         Subjective:  Patient reports 4/10 pain prior to today's tx.  Otherwise pt states of no new complaints/ changes.    Objective   See Exercise, Manual, and Modality Logs for complete treatment.       Assessment/Plan:  Patient responded well to today's session w/ reports of slight decrease in pain following, 10.  Treatment initiated w/ therex as listed f/b manual rx and conclusion of modalities.  Therex progressed w/ additional standing hip strengthening and LE bike initiated to assist w/ strength deficits, and improved stability.  Pt required cues and demonstration while completing new exercises for proper form, and for max benefit.  Manual rx performed to lumbar spine and paraspinals to include STM and PA glides.  Pt observed w/ good tolerance.  Cryotherapy w/ Estim applied at conclusion.  Pt continues to benefit from therapy services, and will be progressed w/ therapy as tolerated.  Continue w/ PT's POC.     Visit Diagnoses:    ICD-10-CM ICD-9-CM   1. Coccydynia  M53.3 724.79   2. Back pain with sciatica  M54.9 724.3    M54.30        Progress per Plan of Care and Progress strengthening /stabilization /functional activity           Timed:  Manual Therapy:    15     mins  83969;  Therapeutic Exercise:    33     mins  22802;     Neuromuscular Camilo:        mins  78510;    Therapeutic Activity:          mins  40407;     Gait Training:           mins  04777;     Ultrasound:          mins  42432;    Electrical Stimulation:         mins  88885 ( );    Untimed:  Electrical Stimulation:    10     mins  14072 ( );  Mechanical Traction:         mins  89455;     Timed Treatment:  48    mins   Total Treatment:     58   mins  Alise Frey. Steffi, MERYL  Physical  Therapist

## 2021-08-23 ENCOUNTER — OFFICE VISIT (OUTPATIENT)
Dept: UROLOGY | Facility: CLINIC | Age: 46
End: 2021-08-23

## 2021-08-23 VITALS — BODY MASS INDEX: 24.11 KG/M2 | WEIGHT: 150 LBS | HEIGHT: 66 IN

## 2021-08-23 DIAGNOSIS — N30.01 ACUTE CYSTITIS WITH HEMATURIA: ICD-10-CM

## 2021-08-23 DIAGNOSIS — B37.31 YEAST VAGINITIS: Primary | ICD-10-CM

## 2021-08-23 PROCEDURE — 99214 OFFICE O/P EST MOD 30 MIN: CPT | Performed by: NURSE PRACTITIONER

## 2021-08-23 RX ORDER — FLUCONAZOLE 100 MG/1
100 TABLET ORAL DAILY
Qty: 3 TABLET | Refills: 2 | Status: SHIPPED | OUTPATIENT
Start: 2021-08-23 | End: 2021-08-26

## 2021-08-23 NOTE — PROGRESS NOTES
"Chief Complaint  Vaginitis    Subjective          Betty Powers presents to [facility] for   History of Present Illness    Ms. Betty Powers is a very pleasant 46-year-old female patient being evaluated in clinic  today with concerns of yeast infection.  Patient was last seen in clinic 2 years ago in 2019 by Dr. Foreman for recurrent UTIs, she reports doing well on suppressive therapy which she stopped 2 years ago.    Today, she is a follow-up for recurrent urinary tract infections and yeast vaginitis. She reports currently doing better off antibiotic prophylaxis with Macrobid x2 years.  She also reports  A significant  improvement in her urinary symptoms.  She denies having any more episodes of frequency, urgency, or nocturia. She denies dysuria, burning on urination, or gross hematuria.  Denies back pain, pelvic pain and pressure, she denies fever or chills, she does not have nausea, vomiting, or diarrhea.  She is unable to give us any urine sample today, states she voided prior to appointment time.    Overall she reports doing better,  She reports  increasing her p.o. fluid intake to at least 2 L daily, and has significantly reduced her intake of soda drinks. She drinks more water and cranberry drinks.    Objective   Vital Signs:   Ht 167.6 cm (65.98\")   Wt 68 kg (150 lb)   BMI 24.22 kg/m²     Physical Exam  Constitutional:       General: She is not in acute distress.     Appearance: She is well-developed.   HENT:      Head: Normocephalic and atraumatic.   Eyes:      Pupils: Pupils are equal, round, and reactive to light.   Neck:      Thyroid: No thyromegaly.      Trachea: No tracheal deviation.   Cardiovascular:      Rate and Rhythm: Normal rate and regular rhythm.      Heart sounds: No murmur heard.     Pulmonary:      Effort: Pulmonary effort is normal. No respiratory distress.      Breath sounds: Normal breath sounds. No stridor. No wheezing.   Abdominal:      " General: Bowel sounds are normal.      Palpations: Abdomen is soft.      Tenderness: There is abdominal tenderness.   Genitourinary:     Labia:         Right: No tenderness.         Left: No tenderness.       Vagina: Normal. No vaginal discharge.   Musculoskeletal:         General: Tenderness present. No deformity. Normal range of motion.      Cervical back: Normal range of motion.   Skin:     General: Skin is warm and dry.      Capillary Refill: Capillary refill takes less than 2 seconds.      Coloration: Skin is not pale.      Findings: No erythema or rash.   Neurological:      Mental Status: She is alert and oriented to person, place, and time.      Cranial Nerves: No cranial nerve deficit.      Sensory: No sensory deficit.      Coordination: Coordination normal.   Psychiatric:         Behavior: Behavior normal.         Thought Content: Thought content normal.         Judgment: Judgment normal.        Result Review :                 Assessment and Plan    Problem List Items Addressed This Visit        Genitourinary and Reproductive     Urinary tract infection with hematuria    Relevant Medications    fluconazole (Diflucan) 100 MG tablet    phenazopyridine (Pyridium) 200 MG tablet      Other Visit Diagnoses     Yeast vaginitis    -  Primary    Relevant Medications    fluconazole (Diflucan) 100 MG tablet                                                  ASSESSMENT  Recurrent urinary tract infections/Yeast Vaginitis: She is in no apparent distress and reports a remarkable improvement in her overall symptoms. She is happy to be feeling better she states.  She like medications for yeast infection.     Again,  We discussed the types of organisms that are found in the urinary tract indicating that the vast majority are results of the patient's own gastrointestinal crystal.  We discussed how many of the antibiotics that are utilized can actually exacerbate these infections by creating resistant organisms and there is only a  very few antibiotics that are concentrated in the urine and do not affect the rectal reservoir nor cause recurrent yeast vaginitis.      We discussed the risk factors for recurrent infections being intercourse in younger patients and atrophic changes in older patients.  We discussed the symptoms that are found including pain, pressure, burning, frequency, urgency suprapubic pain and painful intercourse.  I discussed upper tract symptoms including fevers, chills, and indicated the workup would be much more aggressive if the patient were to present with recurrent infections in the face of upper tract symptomatology such as fever. I discussed the history of vesicoureteral reflux in young patients and finally chronic renal scarring as a result of such.        PLAN  She has been encouraged to increase her p.o. fluid intake to at least 1 to 2 L daily and avoid bladder irritants such as caffeine products, spicy foods, and citrusy foods.     I recommend concomitant probiotics with treatment with antibiotics to protect the rectal reservoir including over-the-counter yogurt preparations to jameson oral pills containing the appropriate probiotics. Patient reports the diligent use of Probiotics.    She is to also continue other medications for yeast vaginitis, atrophic vaginitis as prescribed above.    Will see her back in 1 months for Follow up in clinic and recheck her urinalysis at that time.    Patient is agreeable to plan of care.    Patient reports that she is not currently experiencing any symptoms of urinary incontinence.    Patient's Body mass index is 24.22 kg/m². indicating that she is within normal range (BMI 18.5-24.9). No BMI management plan needed..    Smoking Cessation Counseling:  Never a smoker.  Patient does not currently use any tobacco products.       Follow Up   Return in about 29 days (around 9/21/2021) for Next scheduled follow up/recheck UA/evaluate symptoms.     Patient was given instructions and  counseling regarding her condition or for health maintenance advice. Please see specific information pulled into the AVS if appropriate.

## 2021-08-30 RX ORDER — FLUCONAZOLE 100 MG/1
100 TABLET ORAL DAILY
Qty: 3 TABLET | Refills: 0 | Status: SHIPPED | OUTPATIENT
Start: 2021-08-30 | End: 2021-09-02

## 2021-08-30 RX ORDER — PHENAZOPYRIDINE HYDROCHLORIDE 200 MG/1
200 TABLET, FILM COATED ORAL 3 TIMES DAILY PRN
Qty: 20 TABLET | Refills: 0 | Status: SHIPPED | OUTPATIENT
Start: 2021-08-30 | End: 2022-02-17

## 2021-08-31 NOTE — PATIENT INSTRUCTIONS
Vaginal Yeast Infection, Adult    Vaginal yeast infection is a condition that causes vaginal discharge as well as soreness, swelling, and redness (inflammation) of the vagina. This is a common condition. Some women get this infection frequently.  What are the causes?  This condition is caused by a change in the normal balance of the yeast (candida) and bacteria that live in the vagina. This change causes an overgrowth of yeast, which causes the inflammation.  What increases the risk?  The condition is more likely to develop in women who:  · Take antibiotic medicines.  · Have diabetes.  · Take birth control pills.  · Are pregnant.  · Douche often.  · Have a weak body defense system (immune system).  · Have been taking steroid medicines for a long time.  · Frequently wear tight clothing.  What are the signs or symptoms?  Symptoms of this condition include:  · White, thick, creamy vaginal discharge.  · Swelling, itching, redness, and irritation of the vagina. The lips of the vagina (vulva) may be affected as well.  · Pain or a burning feeling while urinating.  · Pain during sex.  How is this diagnosed?  This condition is diagnosed based on:  · Your medical history.  · A physical exam.  · A pelvic exam. Your health care provider will examine a sample of your vaginal discharge under a microscope. Your health care provider may send this sample for testing to confirm the diagnosis.  How is this treated?  This condition is treated with medicine. Medicines may be over-the-counter or prescription. You may be told to use one or more of the following:  · Medicine that is taken by mouth (orally).  · Medicine that is applied as a cream (topically).  · Medicine that is inserted directly into the vagina (suppository).  Follow these instructions at home:    Lifestyle  · Do not have sex until your health care provider approves. Tell your sex partner that you have a yeast infection. That person should go to his or her health care  provider and ask if they should also be treated.  · Do not wear tight clothes, such as pantyhose or tight pants.  · Wear breathable cotton underwear.  General instructions  · Take or apply over-the-counter and prescription medicines only as told by your health care provider.  · Eat more yogurt. This may help to keep your yeast infection from returning.  · Do not use tampons until your health care provider approves.  · Try taking a sitz bath to help with discomfort. This is a warm water bath that is taken while you are sitting down. The water should only come up to your hips and should cover your buttocks. Do this 3-4 times per day or as told by your health care provider.  · Do not douche.  · If you have diabetes, keep your blood sugar levels under control.  · Keep all follow-up visits as told by your health care provider. This is important.  Contact a health care provider if:  · You have a fever.  · Your symptoms go away and then return.  · Your symptoms do not get better with treatment.  · Your symptoms get worse.  · You have new symptoms.  · You develop blisters in or around your vagina.  · You have blood coming from your vagina and it is not your menstrual period.  · You develop pain in your abdomen.  Summary  · Vaginal yeast infection is a condition that causes discharge as well as soreness, swelling, and redness (inflammation) of the vagina.  · This condition is treated with medicine. Medicines may be over-the-counter or prescription.  · Take or apply over-the-counter and prescription medicines only as told by your health care provider.  · Do not douche. Do not have sex or use tampons until your health care provider approves.  · Contact a health care provider if your symptoms do not get better with treatment or your symptoms go away and then return.  This information is not intended to replace advice given to you by your health care provider. Make sure you discuss any questions you have with your health care  provider.  Document Revised: 07/17/2020 Document Reviewed: 05/06/2019  [employer] Patient Education © 2021 [employer]

## 2021-09-01 ENCOUNTER — TELEPHONE (OUTPATIENT)
Dept: UROLOGY | Facility: CLINIC | Age: 46
End: 2021-09-01

## 2021-09-03 NOTE — TELEPHONE ENCOUNTER
I called the pt and let her know that we were sure what happened she said she would drop one off for us to run

## 2021-09-20 ENCOUNTER — LAB (OUTPATIENT)
Dept: UROLOGY | Facility: CLINIC | Age: 46
End: 2021-09-20

## 2021-09-20 DIAGNOSIS — N30.01 ACUTE CYSTITIS WITH HEMATURIA: Primary | ICD-10-CM

## 2021-09-20 PROCEDURE — 87147 CULTURE TYPE IMMUNOLOGIC: CPT | Performed by: NURSE PRACTITIONER

## 2021-09-20 PROCEDURE — 87086 URINE CULTURE/COLONY COUNT: CPT | Performed by: NURSE PRACTITIONER

## 2021-09-21 ENCOUNTER — OFFICE VISIT (OUTPATIENT)
Dept: UROLOGY | Facility: CLINIC | Age: 46
End: 2021-09-21

## 2021-09-21 VITALS
DIASTOLIC BLOOD PRESSURE: 77 MMHG | BODY MASS INDEX: 24.09 KG/M2 | WEIGHT: 149.91 LBS | HEIGHT: 66 IN | SYSTOLIC BLOOD PRESSURE: 123 MMHG

## 2021-09-21 DIAGNOSIS — N30.00 ACUTE CYSTITIS WITHOUT HEMATURIA: Primary | ICD-10-CM

## 2021-09-21 DIAGNOSIS — B37.31 YEAST VAGINITIS: ICD-10-CM

## 2021-09-21 LAB — BACTERIA SPEC AEROBE CULT: ABNORMAL

## 2021-09-21 PROCEDURE — 99214 OFFICE O/P EST MOD 30 MIN: CPT | Performed by: NURSE PRACTITIONER

## 2021-09-24 ENCOUNTER — TELEPHONE (OUTPATIENT)
Dept: UROLOGY | Facility: CLINIC | Age: 46
End: 2021-09-24

## 2021-09-24 NOTE — TELEPHONE ENCOUNTER
Pt called and wanted to know results of her UC and it was negative. Just natural bacteria no pathogens

## 2022-02-17 ENCOUNTER — OFFICE VISIT (OUTPATIENT)
Dept: FAMILY MEDICINE CLINIC | Facility: CLINIC | Age: 47
End: 2022-02-17

## 2022-02-17 VITALS
OXYGEN SATURATION: 100 % | TEMPERATURE: 98 F | BODY MASS INDEX: 26.03 KG/M2 | SYSTOLIC BLOOD PRESSURE: 118 MMHG | HEART RATE: 82 BPM | DIASTOLIC BLOOD PRESSURE: 76 MMHG | WEIGHT: 162 LBS | HEIGHT: 66 IN

## 2022-02-17 DIAGNOSIS — T23.272A SECOND DEGREE BURN OF LEFT WRIST AND HAND, INITIAL ENCOUNTER: Primary | ICD-10-CM

## 2022-02-17 DIAGNOSIS — L71.9 ROSACEA: ICD-10-CM

## 2022-02-17 DIAGNOSIS — H69.83 EUSTACHIAN TUBE DYSFUNCTION, BILATERAL: ICD-10-CM

## 2022-02-17 DIAGNOSIS — T23.202A SECOND DEGREE BURN OF LEFT WRIST AND HAND, INITIAL ENCOUNTER: Primary | ICD-10-CM

## 2022-02-17 DIAGNOSIS — H92.09 EAR ACHE: ICD-10-CM

## 2022-02-17 PROCEDURE — 99214 OFFICE O/P EST MOD 30 MIN: CPT | Performed by: FAMILY MEDICINE

## 2022-02-17 NOTE — PROGRESS NOTES
Subjective   Betty Powers is a 46 y.o. female.   Pt presents today with CC of Earache      History of Present Illness   1.  Patient is a 46-year-old female here complaining of earache.  She has earaches about 3 times a year.  They occasionally result in ear infection.  She would like evaluation today.  She reports mild pain, 2 out of 10 severity that comes and goes.  She does not currently take antihistamines or nasal steroids.  #2 she burned her left wrist cooking recently.  There is an area couple inches long, half an inch wide of blister with yellow serous fluid.  She has it dressed today and a dry dressing with tape.  She reports that the pain is relatively minimal.  She would like a cream for it.  #3 she has rosacea.  She follows with [facility].  She takes MetroCream 0.75% as prescribed by dermatology.  She reports that it is flared up a little recently for no particular reason.  She would like evaluation of this.             The following portions of the patient's history were reviewed and updated as appropriate: allergies, current medications, past family history, past medical history, past social history, past surgical history and problem list.    Review of Systems   Constitutional: Negative for chills, fever and unexpected weight loss.   HENT: Positive for ear pain. Negative for congestion and sore throat.    Eyes: Negative for blurred vision and visual disturbance.   Respiratory: Negative for cough and wheezing.    Cardiovascular: Negative for chest pain and palpitations.   Gastrointestinal: Negative for abdominal pain and diarrhea.   Endocrine: Negative for cold intolerance and heat intolerance.   Genitourinary: Negative for dysuria.   Musculoskeletal: Negative for arthralgias and neck stiffness.   Neurological: Negative for dizziness, seizures and syncope.   Psychiatric/Behavioral: Negative for self-injury, suicidal ideas and depressed mood.       Objective   Physical Exam  Vitals and nursing  note reviewed.   Constitutional:       Appearance: She is well-developed.   HENT:      Head: Normocephalic and atraumatic.      Right Ear: External ear normal.      Left Ear: External ear normal.      Nose: Nose normal.   Eyes:      Conjunctiva/sclera: Conjunctivae normal.      Pupils: Pupils are equal, round, and reactive to light.   Cardiovascular:      Rate and Rhythm: Normal rate and regular rhythm.      Heart sounds: Normal heart sounds.   Pulmonary:      Effort: Pulmonary effort is normal.      Breath sounds: Normal breath sounds.   Abdominal:      General: Bowel sounds are normal.      Palpations: Abdomen is soft.   Musculoskeletal:      Cervical back: Normal range of motion and neck supple.      Comments: Left wrist on the ulnar side is 3 dime sized blisters, total of about 2-1/2 inches long in the proximal to distal distribution, half inch wide.  No deep burn, some serous fluid is leaking out of one of the blisters.  No concerns.  No signs of infection.   Skin:     General: Skin is warm and dry.   Neurological:      Mental Status: She is alert and oriented to person, place, and time.   Psychiatric:         Behavior: Behavior normal.           Assessment/Plan   Diagnoses and all orders for this visit:    1. Second degree burn of left wrist and hand, initial encounter (Primary)  -     silver sulfadiazine (SILVADENE, SSD) 1 % cream; Apply 1 application topically to the appropriate area as directed 2 (Two) Times a Day.  Dispense: 50 g; Refill: 0  Recommend keeping it covered with a dry dressing, from above under the dressing.  She would likely need to keep it dressed until Monday, perhaps as late as Wednesday until it dries up and scabs over.  Relatively mild second-degree burn.  Pain is minimal.  Recommend keeping the cream on there for about a week as the early scab would likely tear easily and could bleed.  She is on call if she has any further problems.  So far, she is doing a good job taking care of  it.  2. Eustachian tube dysfunction, bilateral  Recommend Tierney maneuver.  Tierney technique for Eustachian tube drainage: With your head rotated to one side and one hand over the forehead, grasp, the angle of the jaw with your fingers and gently milk it forward in a rhythmic motion. This will tug on the Eustachian tube, which connects your middle ear to your throat, and help it to drain.  She may also try Afrin twice a day for 2 days to try and get the fluid out of her ear, its worse on the right, no ear infection, only serous otitis with no bulging or redness.  No otitis media  Antihistamine such as Zyrtec also may be beneficial.  She has an autistic child's of marked sedating medications not recommended.  3. Ear ache  As above  4. Rosacea  Recommend calling dermatologist.  She currently is on MetroCream.  Her rosacea does have a mild flare, no pustules that would need antibiotic in my medical opinion.  If she is unable to get hold of dermatology, may consider an antibiotic in following days.               Patient's Body mass index is 26.16 kg/m². indicating that she is overweight (BMI 25-29.9). Patient's (Body mass index is 26.16 kg/m².) indicates that they are overweight with health conditions that include none . Weight is unchanged. BMI is is above average; BMI management plan is completed. We discussed portion control and increasing exercise. .

## 2022-06-27 ENCOUNTER — TELEPHONE (OUTPATIENT)
Dept: FAMILY MEDICINE CLINIC | Facility: CLINIC | Age: 47
End: 2022-06-27

## 2022-06-27 NOTE — TELEPHONE ENCOUNTER
Caller: PowersBetty    Relationship: Self    Best call back number: [phone]    What medication are you requesting: MEDICATION FOR ECZEMA HANDS    What are your current symptoms: PEELING, BROKE OUT WITH YELLOW BLISTERS    How long have you been experiencing symptoms: HAS HISTORY, THIS BREAK OUT ITS BEEN ABOUT 2 WEEKS.      Have you had these symptoms before:    [x] Yes  [] No    Have you been treated for these symptoms before:   [x] Yes  [] No    If a prescription is needed, what is your preferred pharmacy and phone number: [place], [place] - [address] [place] [address] [phone] [facility] [phone] FX     Additional notes:

## 2022-06-29 ENCOUNTER — OFFICE VISIT (OUTPATIENT)
Dept: FAMILY MEDICINE CLINIC | Facility: CLINIC | Age: 47
End: 2022-06-29

## 2022-06-29 VITALS
OXYGEN SATURATION: 98 % | DIASTOLIC BLOOD PRESSURE: 82 MMHG | BODY MASS INDEX: 25.55 KG/M2 | WEIGHT: 159 LBS | RESPIRATION RATE: 18 BRPM | HEART RATE: 85 BPM | HEIGHT: 66 IN | TEMPERATURE: 97.8 F | SYSTOLIC BLOOD PRESSURE: 125 MMHG

## 2022-06-29 DIAGNOSIS — L30.8 OTHER ECZEMA: Primary | ICD-10-CM

## 2022-06-29 DIAGNOSIS — E66.3 OVERWEIGHT (BMI 25.0-29.9): ICD-10-CM

## 2022-06-29 PROCEDURE — 99213 OFFICE O/P EST LOW 20 MIN: CPT | Performed by: NURSE PRACTITIONER

## 2022-06-29 RX ORDER — PIMECROLIMUS 10 MG/G
1 CREAM TOPICAL 2 TIMES DAILY
Qty: 28 G | Refills: 0 | Status: SHIPPED | OUTPATIENT
Start: 2022-06-29 | End: 2022-07-13

## 2022-06-29 NOTE — PROGRESS NOTES
Chief Complaint  Eczema    Subjective          Betty Powers is a 46 y.o. female who presents today to [facility] for initial evaluation     HPI:   History of Present Illness    Presents to clinic for complaint of eczema.  Reports history of eczema for > 20 years. Three weeks ago started to have it on right palm.  Started with red bumps that turned into blisters and yellow crusting. +itching. Treatments: lotions with no relief.     The following portions of the patient's history were reviewed and updated as appropriate: allergies, current medications, past family history, past medical history, past social history, past surgical history and problem list.    Objective     Allergy:   No Known Allergies     Current Medications:   Current Outpatient Medications   Medication Sig Dispense Refill   • aspirin 81 MG EC tablet Take 81 mg by mouth Daily.     • Probiotic Product (PROBIOTIC DAILY PO) Take 1 tablet by mouth Daily.     • pimecrolimus (ELIDEL) 1 % cream Apply 1 application topically to the appropriate area as directed 2 (Two) Times a Day for 14 days. 28 g 0     No current facility-administered medications for this visit.       Past Medical History:  Past Medical History:   Diagnosis Date   • Allergic rhinitis    • Blood disease    • Dysmenorrhea    • Dysuria    • Headache    • History of blood clots    • Jaundice    • Ovarian tumor     X 3 PER PATIENT   • Pelvic pain    • PONV (postoperative nausea and vomiting)    • Spherocytosis (familial) (HCC)    • Spherocytosis, hereditary (HCC)    • Uterine leiomyoma        Past Surgical History:  Past Surgical History:   Procedure Laterality Date   • ABDOMINAL SURGERY     • CHOLECYSTECTOMY     • SKIN BIOPSY     • TOTAL LAPAROSCOPIC HYSTERECTOMY N/A 4/19/2018    Procedure: TOTAL LAPAROSCOPIC HYSTERECTOMY Left SALPINGO OOPHERECTOMY WITH DAVINCI SI ROBOT;  Surgeon: Kevin Laurent DO;  Location: [facility];  Service: DaVinci   • TUBAL  "ABDOMINAL LIGATION         Social History:  Social History     Socioeconomic History   • Marital status:    Tobacco Use   • Smoking status: Never Smoker   • Smokeless tobacco: Never Used   Vaping Use   • Vaping Use: Never used   Substance and Sexual Activity   • Alcohol use: No   • Drug use: No   • Sexual activity: Defer     Birth control/protection: Surgical       Family History:  Family History   Problem Relation Age of Onset   • Cancer Mother    • Hypertension Mother    • Hypertension Father    • Heart disease Father    • Cancer Father    • No Known Problems Sister    • No Known Problems Brother    • Bell's palsy Brother    • Breast cancer Neg Hx        Review of Systems:  Review of Systems   Skin: Positive for rash.       Vital Signs:   /82 (BP Location: Right arm, Patient Position: Sitting, Cuff Size: Adult)   Pulse 85   Temp 97.8 °F (36.6 °C) (Temporal)   Resp 18   Ht 167.6 cm (66\")   Wt 72.1 kg (159 lb)   SpO2 98%   BMI 25.66 kg/m²      Physical Exam:  Physical Exam  Vitals and nursing note reviewed.   Constitutional:       General: She is not in acute distress.     Appearance: Normal appearance. She is not ill-appearing or toxic-appearing.   HENT:      Head: Normocephalic.   Cardiovascular:      Rate and Rhythm: Normal rate and regular rhythm.      Heart sounds: Normal heart sounds.   Pulmonary:      Effort: Pulmonary effort is normal. No respiratory distress.      Breath sounds: Normal breath sounds.   Skin:     General: Skin is warm and dry.      Coloration: Skin is not pale.      Comments: Right palm: Erythematous, dry and scaly, fissures, yellow crusting   Neurological:      Mental Status: She is alert and oriented to person, place, and time.   Psychiatric:         Mood and Affect: Mood normal.         Behavior: Behavior normal.         Thought Content: Thought content normal.         Judgment: Judgment normal.              Assessment and Plan   Diagnoses and all orders for this " visit:    1. Other eczema (Primary)  -     pimecrolimus (ELIDEL) 1 % cream; Apply 1 application topically to the appropriate area as directed 2 (Two) Times a Day for 14 days.  Dispense: 28 g; Refill: 0  Regimen as above.  Skin care as directed.    2. Overweight (BMI 25.0-29.9)  Diet and lifestyle modifications to promote weight loss      Discussed possible differential diagnoses, testing, treatment, recommended non-pharmacological interventions, risks, warning signs to monitor for that would indicate need for follow-up in clinic or ER. If no improvement with these regimens or you have new or worsening symptoms follow-up. Patient verbalizes understanding and agreement with plan of care. Denies further needs or concerns.     Patient was given instructions and counseling regarding her condition and for health maintenance advised.    BMI is >= 25 and <30. (Overweight) The following options were offered after discussion;: weight loss educational material (shared in after visit summary), exercise counseling/recommendations and nutrition counseling/recommendations       I spent 20 minutes caring for patient on this date of service. This time includes time spent by me in the following activities: preparing for the visit, reviewing tests, obtaining and/or reviewing a separately obtained history, performing a medically appropriate examination and/or evaluation, counseling and educating the patient/family/caregiver, ordering medications, tests, or procedures and documenting information in the medical record    Meds ordered during this visit:  New Medications Ordered This Visit   Medications   • pimecrolimus (ELIDEL) 1 % cream     Sig: Apply 1 application topically to the appropriate area as directed 2 (Two) Times a Day for 14 days.     Dispense:  28 g     Refill:  0       Patient Instructions:  Patient instructions given for the following visit diagnosis:    ICD-10-CM ICD-9-CM   1. Other eczema  L30.8 692.9   2. Overweight  (BMI 25.0-29.9)  E66.3 278.02       Follow Up   Return if symptoms worsen or fail to improve.        This document has been electronically signed by OCTAVIA Marcano  June 29, 2022 14:38 EDT    Patient was given instructions and counseling regarding her condition or for health maintenance advice. Please see specific information pulled into the AVS if appropriate.     Part of this note may be an electronic transcription/translation of spoken language to printed text using the Dragon Dictation System.

## 2022-08-30 ENCOUNTER — OFFICE VISIT (OUTPATIENT)
Dept: UROLOGY | Facility: CLINIC | Age: 47
End: 2022-08-30

## 2022-08-30 VITALS — WEIGHT: 158 LBS | BODY MASS INDEX: 25.39 KG/M2 | HEIGHT: 66 IN

## 2022-08-30 DIAGNOSIS — B37.9 YEAST CELLS AND FUNGAL ELEMENTS PRESENT ON DIAGNOSTIC TESTING: Primary | ICD-10-CM

## 2022-08-30 DIAGNOSIS — B49 YEAST CELLS AND FUNGAL ELEMENTS PRESENT ON DIAGNOSTIC TESTING: Primary | ICD-10-CM

## 2022-08-30 DIAGNOSIS — N39.0 URINARY TRACT INFECTION WITHOUT HEMATURIA, SITE UNSPECIFIED: ICD-10-CM

## 2022-08-30 DIAGNOSIS — N30.00 ACUTE CYSTITIS WITHOUT HEMATURIA: ICD-10-CM

## 2022-08-30 DIAGNOSIS — B37.31 YEAST VAGINITIS: ICD-10-CM

## 2022-08-30 LAB
BILIRUB BLD-MCNC: NEGATIVE MG/DL
CLARITY, POC: CLEAR
COLOR UR: YELLOW
EXPIRATION DATE: NORMAL
GLUCOSE UR STRIP-MCNC: NEGATIVE MG/DL
KETONES UR QL: NEGATIVE
LEUKOCYTE EST, POC: NEGATIVE
Lab: NORMAL
NITRITE UR-MCNC: NEGATIVE MG/ML
PH UR: 6 [PH] (ref 5–8)
PROT UR STRIP-MCNC: NEGATIVE MG/DL
RBC # UR STRIP: NEGATIVE /UL
SP GR UR: 1.01 (ref 1–1.03)
UROBILINOGEN UR QL: NORMAL

## 2022-08-30 PROCEDURE — 87086 URINE CULTURE/COLONY COUNT: CPT | Performed by: NURSE PRACTITIONER

## 2022-08-30 PROCEDURE — 99214 OFFICE O/P EST MOD 30 MIN: CPT | Performed by: NURSE PRACTITIONER

## 2022-09-01 PROBLEM — N30.00 ACUTE CYSTITIS WITHOUT HEMATURIA: Status: ACTIVE | Noted: 2022-09-01

## 2022-09-01 PROBLEM — B37.31 YEAST VAGINITIS: Status: ACTIVE | Noted: 2022-09-01

## 2022-09-01 PROBLEM — B49 YEAST CELLS AND FUNGAL ELEMENTS PRESENT ON DIAGNOSTIC TESTING: Status: ACTIVE | Noted: 2022-09-01

## 2022-09-01 PROBLEM — B37.9 YEAST CELLS AND FUNGAL ELEMENTS PRESENT ON DIAGNOSTIC TESTING: Status: ACTIVE | Noted: 2022-09-01

## 2022-09-01 LAB — BACTERIA SPEC AEROBE CULT: NO GROWTH

## 2022-09-21 ENCOUNTER — HOSPITAL ENCOUNTER (OUTPATIENT)
Dept: MAMMOGRAPHY | Facility: HOSPITAL | Age: 47
Discharge: HOME | End: 2022-09-21
Admitting: NURSE PRACTITIONER

## 2022-09-21 DIAGNOSIS — Z12.31 VISIT FOR SCREENING MAMMOGRAM: ICD-10-CM

## 2022-09-21 PROCEDURE — 77067 SCR MAMMO BI INCL CAD: CPT | Performed by: RADIOLOGY

## 2022-09-21 PROCEDURE — 77063 BREAST TOMOSYNTHESIS BI: CPT | Performed by: RADIOLOGY

## 2022-09-21 PROCEDURE — 77063 BREAST TOMOSYNTHESIS BI: CPT

## 2022-09-21 PROCEDURE — 77067 SCR MAMMO BI INCL CAD: CPT

## 2022-09-27 ENCOUNTER — HOSPITAL ENCOUNTER (OUTPATIENT)
Dept: MAMMOGRAPHY | Facility: HOSPITAL | Age: 47
Discharge: HOME | End: 2022-09-27

## 2022-09-27 ENCOUNTER — HOSPITAL ENCOUNTER (OUTPATIENT)
Dept: ULTRASOUND IMAGING | Facility: HOSPITAL | Age: 47
Discharge: HOME | End: 2022-09-27

## 2022-09-27 DIAGNOSIS — R92.8 ABNORMAL MAMMOGRAM: ICD-10-CM

## 2022-09-27 PROCEDURE — 76642 ULTRASOUND BREAST LIMITED: CPT

## 2022-09-27 PROCEDURE — G0279 TOMOSYNTHESIS, MAMMO: HCPCS

## 2022-09-27 PROCEDURE — 76642 ULTRASOUND BREAST LIMITED: CPT | Performed by: RADIOLOGY

## 2022-09-27 PROCEDURE — 77065 DX MAMMO INCL CAD UNI: CPT

## 2022-09-27 PROCEDURE — 77061 BREAST TOMOSYNTHESIS UNI: CPT | Performed by: RADIOLOGY

## 2022-09-27 PROCEDURE — 77065 DX MAMMO INCL CAD UNI: CPT | Performed by: RADIOLOGY

## 2022-10-20 ENCOUNTER — TELEPHONE (OUTPATIENT)
Dept: ORTHOPEDIC SURGERY | Facility: CLINIC | Age: 47
End: 2022-10-20

## 2022-10-20 NOTE — TELEPHONE ENCOUNTER
Caller: Betty Powers    Relationship to patient: Self    Best call back number: [phone]    Chief complaint: NECK PAIN     Type of visit: NEW PROBLEM      Additional notes: PATIENT SEEN DR CASTILLO FOR HER BACK AND WANTED TO KNOW IF HE WOULD SEE HER FOR THE NECK . SHE HAS BEEN HAVING SOME NECK PAIN   PLEASE ADVISE TY!

## 2022-10-21 ENCOUNTER — TELEPHONE (OUTPATIENT)
Dept: ORTHOPEDIC SURGERY | Facility: CLINIC | Age: 47
End: 2022-10-21

## 2023-01-03 ENCOUNTER — HOSPITAL ENCOUNTER (OUTPATIENT)
Dept: MAMMOGRAPHY | Facility: HOSPITAL | Age: 48
Discharge: HOME | End: 2023-01-03
Admitting: RADIOLOGY
Payer: COMMERCIAL

## 2023-01-03 DIAGNOSIS — R92.8 ABNORMAL MAMMOGRAM: ICD-10-CM

## 2023-01-03 PROCEDURE — 77061 BREAST TOMOSYNTHESIS UNI: CPT | Performed by: RADIOLOGY

## 2023-01-03 PROCEDURE — 77065 DX MAMMO INCL CAD UNI: CPT

## 2023-01-03 PROCEDURE — 77065 DX MAMMO INCL CAD UNI: CPT | Performed by: RADIOLOGY

## 2023-01-03 PROCEDURE — G0279 TOMOSYNTHESIS, MAMMO: HCPCS

## 2023-01-17 ENCOUNTER — OFFICE VISIT (OUTPATIENT)
Dept: FAMILY MEDICINE CLINIC | Facility: CLINIC | Age: 48
End: 2023-01-17
Payer: COMMERCIAL

## 2023-01-17 VITALS
BODY MASS INDEX: 28.19 KG/M2 | OXYGEN SATURATION: 97 % | DIASTOLIC BLOOD PRESSURE: 80 MMHG | HEIGHT: 66 IN | TEMPERATURE: 98 F | SYSTOLIC BLOOD PRESSURE: 110 MMHG | WEIGHT: 175.4 LBS | HEART RATE: 76 BPM

## 2023-01-17 DIAGNOSIS — D58.0 SPHEROCYTOSIS, HEREDITARY: Primary | ICD-10-CM

## 2023-01-17 DIAGNOSIS — R00.2 PALPITATIONS: ICD-10-CM

## 2023-01-17 PROCEDURE — 85025 COMPLETE CBC W/AUTO DIFF WBC: CPT | Performed by: NURSE PRACTITIONER

## 2023-01-17 PROCEDURE — 99213 OFFICE O/P EST LOW 20 MIN: CPT | Performed by: NURSE PRACTITIONER

## 2023-01-17 PROCEDURE — 84443 ASSAY THYROID STIM HORMONE: CPT | Performed by: NURSE PRACTITIONER

## 2023-01-17 PROCEDURE — 93000 ELECTROCARDIOGRAM COMPLETE: CPT | Performed by: NURSE PRACTITIONER

## 2023-01-17 PROCEDURE — 80048 BASIC METABOLIC PNL TOTAL CA: CPT | Performed by: NURSE PRACTITIONER

## 2023-01-17 RX ORDER — DOXYCYCLINE HYCLATE 20 MG
TABLET ORAL
COMMUNITY
Start: 2022-12-10

## 2023-01-17 NOTE — PROGRESS NOTES
"Chief Complaint  Heart Problem (Pt requests referral to [facility] )    Subjective        Betty Powers presents to [facility]  Heart Problem  This is a recurrent (Palpitations. Concerned with HX clotting disorder) problem. The current episode started more than 1 year ago. The problem occurs intermittently. The problem has been unchanged. Associated symptoms include fatigue. Pertinent negatives include no chest pain. Nothing aggravates the symptoms. The treatment provided mild relief.       Objective   Vital Signs:  /80 (BP Location: Right arm, Patient Position: Sitting)   Pulse 76   Temp 98 °F (36.7 °C)   Ht 167.6 cm (65.98\")   Wt 79.6 kg (175 lb 6.4 oz)   SpO2 97%   BMI 28.33 kg/m²   Estimated body mass index is 28.33 kg/m² as calculated from the following:    Height as of this encounter: 167.6 cm (65.98\").    Weight as of this encounter: 79.6 kg (175 lb 6.4 oz).             Physical Exam  Vitals and nursing note reviewed.   Constitutional:       General: She is not in acute distress.     Appearance: She is well-developed. She is obese. She is not ill-appearing.   HENT:      Head: Normocephalic.   Cardiovascular:      Rate and Rhythm: Normal rate and regular rhythm.      Heart sounds: Normal heart sounds. No murmur heard.  Pulmonary:      Effort: Pulmonary effort is normal.      Breath sounds: Normal breath sounds. No wheezing.   Skin:     General: Skin is warm and dry.   Neurological:      Mental Status: She is alert and oriented to person, place, and time.   Psychiatric:         Behavior: Behavior normal.        Result Review :  During this visit the following were done:  Labs Reviewed [x]    Labs Ordered [x]    Radiology Reports Reviewed []    Radiology Ordered []    PCP Records Reviewed []    Referring Provider Records Reviewed []    ER Records Reviewed []    Hospital Records Reviewed []    History Obtained From Family []    Radiology Images Reviewed []  "   Other Reviewed [x]    Records Requested []          ECG 12 Lead    Date/Time: 1/17/2023 4:52 PM  Performed by: Kenya Rangel APRN  Authorized by: Kenya Rangel APRN   Comparison: not compared with previous ECG   Rhythm: sinus rhythm  Rate: normal  BPM: 62  Conduction: conduction normal  ST Segments: ST segments normal  T Waves: T waves normal  QRS axis: normal  Other: no other findings    Clinical impression: normal ECG              Assessment and Plan   Diagnoses and all orders for this visit:    1. Spherocytosis, hereditary (HCC) (Primary)  -     Ambulatory Referral to Cardiology  -     ECG 12 Lead    2. Palpitations  -     Ambulatory Referral to Cardiology  -     ECG 12 Lead  -     CBC Auto Differential; Future  -     Basic Metabolic Panel; Future  -     TSH; Future  -     CBC Auto Differential  -     Basic Metabolic Panel  -     TSH             Follow Up   Return if symptoms worsen or fail to improve, for Recheck lab today .  Patient was given instructions and counseling regarding her condition or for health maintenance advice. Please see specific information pulled into the AVS if appropriate.

## 2023-01-18 LAB
ANION GAP SERPL CALCULATED.3IONS-SCNC: 7 MMOL/L (ref 5–15)
BASOPHILS # BLD AUTO: 0.03 10*3/MM3 (ref 0–0.2)
BASOPHILS NFR BLD AUTO: 0.4 % (ref 0–1.5)
BUN SERPL-MCNC: 8 MG/DL (ref 6–20)
BUN/CREAT SERPL: 11.1 (ref 7–25)
CALCIUM SPEC-SCNC: 9.4 MG/DL (ref 8.6–10.5)
CHLORIDE SERPL-SCNC: 105 MMOL/L (ref 98–107)
CO2 SERPL-SCNC: 27 MMOL/L (ref 22–29)
CREAT SERPL-MCNC: 0.72 MG/DL (ref 0.57–1)
DEPRECATED RDW RBC AUTO: 45.1 FL (ref 37–54)
EGFRCR SERPLBLD CKD-EPI 2021: 103.9 ML/MIN/1.73
EOSINOPHIL # BLD AUTO: 0.09 10*3/MM3 (ref 0–0.4)
EOSINOPHIL NFR BLD AUTO: 1.3 % (ref 0.3–6.2)
ERYTHROCYTE [DISTWIDTH] IN BLOOD BY AUTOMATED COUNT: 13.3 % (ref 12.3–15.4)
GLUCOSE SERPL-MCNC: 91 MG/DL (ref 65–99)
HCT VFR BLD AUTO: 36.3 % (ref 34–46.6)
HGB BLD-MCNC: 12.9 G/DL (ref 12–15.9)
IMM GRANULOCYTES # BLD AUTO: 0.03 10*3/MM3 (ref 0–0.05)
IMM GRANULOCYTES NFR BLD AUTO: 0.4 % (ref 0–0.5)
LYMPHOCYTES # BLD AUTO: 1.56 10*3/MM3 (ref 0.7–3.1)
LYMPHOCYTES NFR BLD AUTO: 22 % (ref 19.6–45.3)
MCH RBC QN AUTO: 33.5 PG (ref 26.6–33)
MCHC RBC AUTO-ENTMCNC: 35.5 G/DL (ref 31.5–35.7)
MCV RBC AUTO: 94.3 FL (ref 79–97)
MONOCYTES # BLD AUTO: 0.39 10*3/MM3 (ref 0.1–0.9)
MONOCYTES NFR BLD AUTO: 5.5 % (ref 5–12)
NEUTROPHILS NFR BLD AUTO: 4.98 10*3/MM3 (ref 1.7–7)
NEUTROPHILS NFR BLD AUTO: 70.4 % (ref 42.7–76)
NRBC BLD AUTO-RTO: 0 /100 WBC (ref 0–0.2)
PLATELET # BLD AUTO: 220 10*3/MM3 (ref 140–450)
PMV BLD AUTO: 11.4 FL (ref 6–12)
POTASSIUM SERPL-SCNC: 3.9 MMOL/L (ref 3.5–5.2)
RBC # BLD AUTO: 3.85 10*6/MM3 (ref 3.77–5.28)
SODIUM SERPL-SCNC: 139 MMOL/L (ref 136–145)
TSH SERPL DL<=0.05 MIU/L-ACNC: 2.2 UIU/ML (ref 0.27–4.2)
WBC NRBC COR # BLD: 7.08 10*3/MM3 (ref 3.4–10.8)

## 2023-02-06 ENCOUNTER — TRANSCRIBE ORDERS (OUTPATIENT)
Dept: ONCOLOGY | Facility: HOSPITAL | Age: 48
End: 2023-02-06
Payer: COMMERCIAL

## 2023-02-06 ENCOUNTER — TELEPHONE (OUTPATIENT)
Dept: FAMILY MEDICINE CLINIC | Facility: CLINIC | Age: 48
End: 2023-02-06
Payer: COMMERCIAL

## 2023-02-06 ENCOUNTER — INFUSION (OUTPATIENT)
Dept: ONCOLOGY | Facility: HOSPITAL | Age: 48
End: 2023-02-06
Payer: COMMERCIAL

## 2023-02-06 VITALS
SYSTOLIC BLOOD PRESSURE: 151 MMHG | OXYGEN SATURATION: 98 % | TEMPERATURE: 98 F | RESPIRATION RATE: 18 BRPM | HEART RATE: 80 BPM | DIASTOLIC BLOOD PRESSURE: 64 MMHG

## 2023-02-06 DIAGNOSIS — D58.0 SPHEROCYTOSIS, HEREDITARY: ICD-10-CM

## 2023-02-06 DIAGNOSIS — D58.0 SPHEROCYTOSIS, HEREDITARY: Primary | ICD-10-CM

## 2023-02-06 LAB
ALBUMIN SERPL-MCNC: 4.4 G/DL (ref 3.5–5.2)
ALBUMIN/GLOB SERPL: 1.8 G/DL
ALP SERPL-CCNC: 72 U/L (ref 39–117)
ALT SERPL W P-5'-P-CCNC: 15 U/L (ref 1–33)
ANION GAP SERPL CALCULATED.3IONS-SCNC: 12.4 MMOL/L (ref 5–15)
AST SERPL-CCNC: 22 U/L (ref 1–32)
BASOPHILS # BLD AUTO: 0.02 10*3/MM3 (ref 0–0.2)
BASOPHILS NFR BLD AUTO: 0.3 % (ref 0–1.5)
BILIRUB SERPL-MCNC: 4 MG/DL (ref 0–1.2)
BUN SERPL-MCNC: 6 MG/DL (ref 6–20)
BUN/CREAT SERPL: 9.1 (ref 7–25)
CALCIUM SPEC-SCNC: 9.1 MG/DL (ref 8.6–10.5)
CHLORIDE SERPL-SCNC: 104 MMOL/L (ref 98–107)
CO2 SERPL-SCNC: 25.6 MMOL/L (ref 22–29)
CREAT SERPL-MCNC: 0.66 MG/DL (ref 0.57–1)
DEPRECATED RDW RBC AUTO: 51.4 FL (ref 37–54)
EGFRCR SERPLBLD CKD-EPI 2021: 109 ML/MIN/1.73
EOSINOPHIL # BLD AUTO: 0.08 10*3/MM3 (ref 0–0.4)
EOSINOPHIL NFR BLD AUTO: 1.2 % (ref 0.3–6.2)
ERYTHROCYTE [DISTWIDTH] IN BLOOD BY AUTOMATED COUNT: 15.4 % (ref 12.3–15.4)
GLOBULIN UR ELPH-MCNC: 2.5 GM/DL
GLUCOSE SERPL-MCNC: 112 MG/DL (ref 65–99)
HCT VFR BLD AUTO: 29.8 % (ref 34–46.6)
HGB BLD-MCNC: 10.9 G/DL (ref 12–15.9)
IMM GRANULOCYTES # BLD AUTO: 0.04 10*3/MM3 (ref 0–0.05)
IMM GRANULOCYTES NFR BLD AUTO: 0.6 % (ref 0–0.5)
LYMPHOCYTES # BLD AUTO: 1.72 10*3/MM3 (ref 0.7–3.1)
LYMPHOCYTES NFR BLD AUTO: 26.3 % (ref 19.6–45.3)
MCH RBC QN AUTO: 34.3 PG (ref 26.6–33)
MCHC RBC AUTO-ENTMCNC: 36.6 G/DL (ref 31.5–35.7)
MCV RBC AUTO: 93.7 FL (ref 79–97)
MONOCYTES # BLD AUTO: 0.33 10*3/MM3 (ref 0.1–0.9)
MONOCYTES NFR BLD AUTO: 5 % (ref 5–12)
NEUTROPHILS NFR BLD AUTO: 4.36 10*3/MM3 (ref 1.7–7)
NEUTROPHILS NFR BLD AUTO: 66.6 % (ref 42.7–76)
NRBC BLD AUTO-RTO: 0 /100 WBC (ref 0–0.2)
PLATELET # BLD AUTO: 194 10*3/MM3 (ref 140–450)
PMV BLD AUTO: 10 FL (ref 6–12)
POTASSIUM SERPL-SCNC: 3.3 MMOL/L (ref 3.5–5.2)
PROT SERPL-MCNC: 6.9 G/DL (ref 6–8.5)
RBC # BLD AUTO: 3.18 10*6/MM3 (ref 3.77–5.28)
SODIUM SERPL-SCNC: 142 MMOL/L (ref 136–145)
WBC NRBC COR # BLD: 6.55 10*3/MM3 (ref 3.4–10.8)

## 2023-02-06 PROCEDURE — 36415 COLL VENOUS BLD VENIPUNCTURE: CPT

## 2023-02-06 PROCEDURE — 80053 COMPREHEN METABOLIC PANEL: CPT

## 2023-02-06 PROCEDURE — 96360 HYDRATION IV INFUSION INIT: CPT

## 2023-02-06 PROCEDURE — 85025 COMPLETE CBC W/AUTO DIFF WBC: CPT

## 2023-02-06 RX ADMIN — SODIUM CHLORIDE 1000 ML: 9 INJECTION, SOLUTION INTRAVENOUS at 15:23

## 2023-02-06 NOTE — SIGNIFICANT NOTE
No return call from A Rangel office received. Attempted to call back to office. Office currently closed. Instructed patient to call MD office in AM for further instructions. Verb understanding. Left unit via ambulation with no distress noted

## 2023-02-06 NOTE — TELEPHONE ENCOUNTER
----- Message from OCTAVIA Roach sent at 2/6/2023  9:08 AM EST -----  Patient called yesterday concerned with increased jaundice she recent ly had a virus with limited intake 3 days and with blood disorder her  has noted increased jaundice.  She declined to go to the ED as she was feeling better and felt changes were related to recent illness.   Please call and re evaluate her concerns she may need to have labs and IVF if symptoms are persisting      Left a message to return call.      Spoke with patient she reports she feels better but her Jaundice is worse,spoke with provider ,needs IVF's & repeat labs,called the infusion Center they can infuse her at 3 PM,orders faxed,left a message for patient to return call.      Patient notified & verbalized understanding.

## 2023-02-06 NOTE — TELEPHONE ENCOUNTER
[facility] called to report patient is there for IV fluids and Potassium is 3.3.

## 2023-02-07 DIAGNOSIS — D58.0 SPHEROCYTOSIS, HEREDITARY: Primary | ICD-10-CM

## 2023-02-07 RX ORDER — POTASSIUM CHLORIDE 750 MG/1
10 TABLET, FILM COATED, EXTENDED RELEASE ORAL 2 TIMES DAILY
Qty: 6 TABLET | Refills: 0 | Status: SHIPPED | OUTPATIENT
Start: 2023-02-07 | End: 2023-02-22

## 2023-02-07 NOTE — TELEPHONE ENCOUNTER
Follow up with patient to see if her symptoms have improved.  Will send in potassium replacement and order repeat labs for Monday    Spoke with patient she reports her symptoms are better & her Jaundice is improved,verbalized understanding.

## 2023-02-07 NOTE — TELEPHONE ENCOUNTER
Follow up with patient to see if her symptoms have improved.  Will send in potassium replacement and order repeat labs for Monday

## 2023-02-21 ENCOUNTER — TELEPHONE (OUTPATIENT)
Dept: FAMILY MEDICINE CLINIC | Facility: CLINIC | Age: 48
End: 2023-02-21
Payer: COMMERCIAL

## 2023-02-22 ENCOUNTER — OFFICE VISIT (OUTPATIENT)
Dept: CARDIOLOGY | Facility: CLINIC | Age: 48
End: 2023-02-22
Payer: COMMERCIAL

## 2023-02-22 VITALS
DIASTOLIC BLOOD PRESSURE: 86 MMHG | WEIGHT: 168 LBS | SYSTOLIC BLOOD PRESSURE: 126 MMHG | HEIGHT: 66 IN | OXYGEN SATURATION: 98 % | BODY MASS INDEX: 27 KG/M2 | HEART RATE: 69 BPM

## 2023-02-22 DIAGNOSIS — R00.2 PALPITATIONS: Primary | ICD-10-CM

## 2023-02-22 PROCEDURE — 99204 OFFICE O/P NEW MOD 45 MIN: CPT | Performed by: INTERNAL MEDICINE

## 2023-02-22 NOTE — PROGRESS NOTES
"[facility]  Consultation H&P  Betty Powers  1975  [phone]  There is no work phone number on file..    VISIT DATE:  02/22/2023    PCP: Kenya Rangel, APRN  [address] [place] [address]    CC:  Chief Complaint   Patient presents with   • Palpitations       Previous cardiac studies and procedures:  December 2017   TTE: EF 60 to 65%, no valvular heart disease, color-flow suggestive of small left to right intra-atrial shunt, negative bubble study.  30-day monitor: Normal.    ASSESSMENT:   Diagnosis Plan   1. Palpitations  Holter Monitor - 48 Hour            PLAN:  Palpitations: 48-hour Holter monitor pending for symptom rhythm correlation.      PFO with history of potential embolic event: Instructed her to start taking enteric-coated aspirin 81 mg every other day.  If she does not tolerate this we will consider clopidogrel.  Results of previous cardiac evaluation of been requested.    History of Present Illness   47-year-old female with hereditary spherocytosis and previously diagnosed PFO presenting with increasing palpitations.  Describes brief fluttering sensations in her chest which causes her to cough.  At rest and with activity.  Is increasing in frequency over the past few weeks.  No obvious triggers.  Otherwise denies chest pain or dyspnea on exertion.  Blood pressures running less than 130/80 mmHg.  Had previously experienced an episode in which Nabeel her toes translated turn purple and she lost her nails on those toes.  She had an echo with a mildly positive bubble study after that, I have requested results of that evaluation.  She was placed on aspirin therapy after those episodes.  She was compliant with aspirin therapy until started develop some dyspepsia.    PHYSICAL EXAMINATION:  Vitals:    02/22/23 1439   BP: 126/86   BP Location: Left arm   Patient Position: Sitting   Pulse: 69   SpO2: 98%   Weight: 76.2 kg (168 lb)   Height: 167.6 cm (66\")     General " Appearance:    Alert, cooperative, no distress, appears stated age   Head:    Normocephalic, without obvious abnormality, atraumatic   Eyes:    conjunctiva/corneas clear, EOM's intact, fundi     benign, both eyes   Ears:    Normal TM's and external ear canals, both ears   Nose:   Nares normal, septum midline, mucosa normal, no drainage    or sinus tenderness   Throat:   Lips, mucosa, and tongue normal; teeth and gums normal   Neck:   Supple, symmetrical, trachea midline, no adenopathy;     thyroid:  no enlargement/tenderness/nodules; no carotid    bruit or JVD   Back:     Symmetric, no curvature, ROM normal, no CVA tenderness   Lungs:     Clear to auscultation bilaterally, respirations unlabored   Chest Wall:    No tenderness or deformity    Heart:    Regular rate and rhythm, S1 and S2 normal, no murmur, rub   or gallop, normal carotid impulse bilaterally without bruit.   Abdomen:     Soft, non-tender, bowel sounds active all four quadrants,     no masses, no organomegaly   Extremities:   Extremities normal, atraumatic, no cyanosis or edema   Pulses:   2+ and symmetric all extremities   Skin:   Skin color, texture, turgor normal, no rashes or lesions   Lymph nodes:   Cervical, supraclavicular, and axillary nodes normal   Neurologic:   normal strength, sensation intact     throughout       Diagnostic Data:  Procedures  Lab Results   Component Value Date    TRIG 76 04/22/2021    HDL 44 04/22/2021     Lab Results   Component Value Date    GLUCOSE 112 (H) 02/06/2023    BUN 6 02/06/2023    CREATININE 0.66 02/06/2023     02/06/2023    K 3.3 (L) 02/06/2023     02/06/2023    CO2 25.6 02/06/2023     No results found for: HGBA1C  Lab Results   Component Value Date    WBC 6.55 02/06/2023    HGB 10.9 (L) 02/06/2023    HCT 29.8 (L) 02/06/2023     02/06/2023       PROBLEM LIST:  Patient Active Problem List   Diagnosis   • Spherocytosis, hereditary (HCC)   • Jaundice   • Abnormal uterine bleeding (AUB)   •  Urinary tract infection with hematuria   • Yeast cells and fungal elements present on diagnostic testing   • Acute cystitis without hematuria   • Yeast vaginitis       PAST MEDICAL HX  Past Medical History:   Diagnosis Date   • Allergic rhinitis    • Blood disease    • Dysmenorrhea    • Dysuria    • Headache    • History of blood clots    • Jaundice    • Ovarian tumor     X 3 PER PATIENT   • Pelvic pain    • PONV (postoperative nausea and vomiting)    • Spherocytosis (familial) (HCC)    • Spherocytosis, hereditary (HCC)    • Uterine leiomyoma        Allergies  No Known Allergies    Current Medications    Current Outpatient Medications:   •  doxycycline (PERIOSTAT) 20 MG tablet, TAKE ONE TABLET BY MOUTH TWICE DAILY WITH FOOD FOR INFECTION FOR 30 DAYS, Disp: , Rfl:   •  NON FORMULARY, Daily. ORANGE BURPS, Disp: , Rfl:          ROS  ROS      SOCIAL HX  Social History     Socioeconomic History   • Marital status:    Tobacco Use   • Smoking status: Never   • Smokeless tobacco: Never   Vaping Use   • Vaping Use: Never used   Substance and Sexual Activity   • Alcohol use: No   • Drug use: No   • Sexual activity: Defer     Birth control/protection: Surgical       FAMILY HX  Family History   Problem Relation Age of Onset   • Heart failure Mother    • Heart attack Mother    • Cancer Mother    • Hypertension Mother    • Heart attack Father    • Hypertension Father    • Heart disease Father    • No Known Problems Sister    • No Known Problems Brother    • Bell's palsy Brother    • Breast cancer Neg Hx              Michael Alvarez III, MD, FACC

## 2023-02-23 ENCOUNTER — TELEPHONE (OUTPATIENT)
Dept: FAMILY MEDICINE CLINIC | Facility: CLINIC | Age: 48
End: 2023-02-23
Payer: COMMERCIAL

## 2023-03-15 ENCOUNTER — TELEPHONE (OUTPATIENT)
Dept: CARDIOLOGY | Facility: CLINIC | Age: 48
End: 2023-03-15
Payer: COMMERCIAL

## 2023-03-15 NOTE — TELEPHONE ENCOUNTER
----- Message from Michael Alvarez III, MD sent at 3/15/2023 11:38 AM EDT -----  Normal heart monitor.

## 2023-03-15 NOTE — TELEPHONE ENCOUNTER
Notified of message above from . She wants you to review her Echo that was done a  few yrs ago by . Its scanned in media. Please advise.

## 2023-04-11 ENCOUNTER — HOSPITAL ENCOUNTER (OUTPATIENT)
Dept: MAMMOGRAPHY | Facility: HOSPITAL | Age: 48
Discharge: HOME | End: 2023-04-11
Admitting: RADIOLOGY
Payer: COMMERCIAL

## 2023-04-11 DIAGNOSIS — R92.8 ABNORMAL MAMMOGRAM: ICD-10-CM

## 2023-04-11 PROCEDURE — 77065 DX MAMMO INCL CAD UNI: CPT | Performed by: RADIOLOGY

## 2023-04-11 PROCEDURE — 77061 BREAST TOMOSYNTHESIS UNI: CPT | Performed by: RADIOLOGY

## 2023-04-11 PROCEDURE — 77065 DX MAMMO INCL CAD UNI: CPT

## 2023-04-11 PROCEDURE — G0279 TOMOSYNTHESIS, MAMMO: HCPCS

## 2023-06-01 ENCOUNTER — PROCEDURE VISIT (OUTPATIENT)
Dept: UROLOGY | Facility: CLINIC | Age: 48
End: 2023-06-01
Payer: COMMERCIAL

## 2023-06-01 VITALS
WEIGHT: 168.6 LBS | DIASTOLIC BLOOD PRESSURE: 75 MMHG | SYSTOLIC BLOOD PRESSURE: 125 MMHG | HEIGHT: 66 IN | BODY MASS INDEX: 27.1 KG/M2 | HEART RATE: 70 BPM

## 2023-06-01 DIAGNOSIS — N30.01 ACUTE CYSTITIS WITH HEMATURIA: ICD-10-CM

## 2023-06-01 DIAGNOSIS — B37.31 YEAST VAGINITIS: ICD-10-CM

## 2023-06-01 DIAGNOSIS — N39.0 URINARY TRACT INFECTION WITHOUT HEMATURIA, SITE UNSPECIFIED: Primary | ICD-10-CM

## 2023-06-01 PROCEDURE — 81003 URINALYSIS AUTO W/O SCOPE: CPT | Performed by: UROLOGY

## 2023-06-01 PROCEDURE — 87086 URINE CULTURE/COLONY COUNT: CPT | Performed by: UROLOGY

## 2023-06-01 PROCEDURE — 52000 CYSTOURETHROSCOPY: CPT | Performed by: UROLOGY

## 2023-06-01 RX ORDER — NITROFURANTOIN 25; 75 MG/1; MG/1
100 CAPSULE ORAL NIGHTLY
Qty: 30 CAPSULE | Refills: 6 | Status: SHIPPED | OUTPATIENT
Start: 2023-06-01 | End: 2023-07-01

## 2023-06-01 RX ORDER — GENTAMICIN SULFATE 40 MG/ML
80 INJECTION, SOLUTION INTRAMUSCULAR; INTRAVENOUS ONCE
Status: COMPLETED | OUTPATIENT
Start: 2023-06-01 | End: 2023-06-01

## 2023-06-01 RX ORDER — AMITRIPTYLINE HYDROCHLORIDE 10 MG/1
10 TABLET, FILM COATED ORAL NIGHTLY
Qty: 30 TABLET | Refills: 3 | Status: SHIPPED | OUTPATIENT
Start: 2023-06-01

## 2023-06-01 RX ADMIN — GENTAMICIN SULFATE 80 MG: 40 INJECTION, SOLUTION INTRAMUSCULAR; INTRAVENOUS at 11:24

## 2023-06-01 NOTE — PROGRESS NOTES
Chief Complaint:      Chief Complaint   Patient presents with    Blood in Urine    Cystitis     Cystoscopy        HPI:   47 y.o. female for cystoscopy.This is a also a 1 year follow-up for recurrent urinary tract infections and yeast vaginitis. She reports currently doing better off antibiotic prophylaxis with Macrobid x3 years.  She also reports  A significant  improvement in her urinary symptoms, especially constant vaginal itching, discharge, irritation and discomfort consistent with yeast vaginitis.      Patient reports she has been followed by [facility] and has been compliant with her boric acid therapy.  She reports adequate therapy with Boric Acid suppositories every month for 14 days only, with significant improvement in her symptoms.  However as soon as the therapy is over for the month, she becomes symptomatic with vaginal itching, irritation and discomfort consistent with her prior symptoms.     On evaluation in clinic today, she however denies having any more episodes of frequency, urgency, or nocturia. She denies dysuria, burning on urination, or gross hematuria.  Denies back pain, pelvic pain and pressure, she denies fever or chills, she does not have nausea, vomiting, or diarrhea.  Her urine dipstick today is completely negative for any infection, it is negative for gross/microscopic hematuria.  Her PVR is 0 mL.     Although miserable, with questions about the cause of her positive yeast infections, overall she reports doing better,  She reports  increasing her p.o. fluid intake to at least 2 L daily, and has significantly reduced her intake of soda drinks. She drinks more water and cranberry drinks.  She is consistent with her probiotics, she reports major lifestyle changes yet her symptoms have persisted.       Past Medical History:     Past Medical History:   Diagnosis Date    Allergic rhinitis     Blood disease     Dysmenorrhea     Dysuria     Headache     History of blood clots     Jaundice      Ovarian tumor     X 3 PER PATIENT    Pelvic pain     PONV (postoperative nausea and vomiting)     Spherocytosis (familial)     Spherocytosis, hereditary     Uterine leiomyoma        Current Meds:     Current Outpatient Medications   Medication Sig Dispense Refill    NON FORMULARY Daily. ORANGE BURPS       No current facility-administered medications for this visit.        Allergies:      No Known Allergies     Past Surgical History:     Past Surgical History:   Procedure Laterality Date    ABDOMINAL SURGERY      CHOLECYSTECTOMY      SKIN BIOPSY      TOTAL LAPAROSCOPIC HYSTERECTOMY N/A 4/19/2018    Procedure: TOTAL LAPAROSCOPIC HYSTERECTOMY Left SALPINGO OOPHERECTOMY WITH DAVINCI SI ROBOT;  Surgeon: Kevin Laurent DO;  Location: [facility];  Service: DaVinci    TUBAL ABDOMINAL LIGATION         Social History:     Social History     Socioeconomic History    Marital status:    Tobacco Use    Smoking status: Never    Smokeless tobacco: Never   Vaping Use    Vaping Use: Never used   Substance and Sexual Activity    Alcohol use: No    Drug use: No    Sexual activity: Defer     Birth control/protection: Surgical       Family History:     Family History   Problem Relation Age of Onset    Heart failure Mother     Heart attack Mother     Cancer Mother     Hypertension Mother     Heart attack Father     Hypertension Father     Heart disease Father     No Known Problems Sister     No Known Problems Brother     Bell's palsy Brother     Breast cancer Neg Hx        Review of Systems:     Review of Systems   Constitutional: Negative.  Negative for activity change, appetite change, chills, diaphoresis, fatigue and unexpected weight change.   HENT:  Negative for congestion, dental problem, drooling, ear discharge, ear pain, facial swelling, hearing loss, mouth sores, nosebleeds, postnasal drip, rhinorrhea, sinus pressure, sneezing, sore throat, tinnitus, trouble swallowing and voice change.    Eyes: Negative.  Negative  for photophobia, pain, discharge, redness, itching and visual disturbance.   Respiratory: Negative.  Negative for apnea, cough, choking, chest tightness, shortness of breath, wheezing and stridor.    Cardiovascular: Negative.  Negative for chest pain, palpitations and leg swelling.   Gastrointestinal: Negative.  Negative for abdominal distention, abdominal pain, anal bleeding, blood in stool, constipation, diarrhea, nausea, rectal pain and vomiting.   Endocrine: Negative.  Negative for cold intolerance, heat intolerance, polydipsia, polyphagia and polyuria.   Musculoskeletal: Negative.  Negative for arthralgias, back pain, gait problem, joint swelling, myalgias, neck pain and neck stiffness.   Skin: Negative.  Negative for color change, pallor, rash and wound.   Allergic/Immunologic: Negative.  Negative for environmental allergies, food allergies and immunocompromised state.   Neurological: Negative.  Negative for dizziness, tremors, seizures, syncope, facial asymmetry, speech difficulty, weakness, light-headedness, numbness and headaches.   Hematological: Negative.  Negative for adenopathy. Does not bruise/bleed easily.   Psychiatric/Behavioral:  Negative for agitation, behavioral problems, confusion, decreased concentration, dysphoric mood, hallucinations, self-injury, sleep disturbance and suicidal ideas. The patient is not nervous/anxious and is not hyperactive.    All other systems reviewed and are negative.    Physical Exam:     Physical Exam  Constitutional:       Appearance: She is well-developed.   HENT:      Head: Normocephalic and atraumatic.      Right Ear: External ear normal.      Left Ear: External ear normal.   Eyes:      Conjunctiva/sclera: Conjunctivae normal.      Pupils: Pupils are equal, round, and reactive to light.   Cardiovascular:      Rate and Rhythm: Normal rate and regular rhythm.      Heart sounds: Normal heart sounds.   Pulmonary:      Effort: Pulmonary effort is normal.      Breath  sounds: Normal breath sounds.   Abdominal:      General: Bowel sounds are normal. There is no distension.      Palpations: Abdomen is soft. There is no mass.      Tenderness: There is no abdominal tenderness. There is no guarding or rebound.   Genitourinary:     Vagina: No vaginal discharge.   Musculoskeletal:         General: Normal range of motion.   Skin:     General: Skin is warm and dry.   Neurological:      Mental Status: She is alert.      Deep Tendon Reflexes: Reflexes are normal and symmetric.   Psychiatric:         Behavior: Behavior normal.         Thought Content: Thought content normal.         Judgment: Judgment normal.       I have reviewed the following portions of the patient's history: Allergies, current medications, past family history, past medical history, past social history, past surgical history, problem list, and ROS and confirm it is accurate.    Recent Image (CT and/or KUB):      CT Abdomen and Pelvis: No results found for this or any previous visit.       CT Stone Protocol: No results found for this or any previous visit.       KUB: No results found for this or any previous visit.       Labs (past 3 months):      Procedure visit on 06/01/2023   Component Date Value Ref Range Status    Color 06/01/2023 Yellow  Yellow, Straw, Dark Yellow, Denisse Final    Clarity, UA 06/01/2023 Clear  Clear Final    Specific Gravity  06/01/2023 1.015  1.005 - 1.030 Final    pH, Urine 06/01/2023 6.0  5.0 - 8.0 Final    Leukocytes 06/01/2023 Negative  Negative Final    Nitrite, UA 06/01/2023 Negative  Negative Final    Protein, POC 06/01/2023 Negative  Negative mg/dL Final    Glucose, UA 06/01/2023 Negative  Negative mg/dL Final    Ketones, UA 06/01/2023 Negative  Negative Final    Urobilinogen, UA 06/01/2023 Normal  Normal, 0.2 E.U./dL Final    Bilirubin 06/01/2023 Negative  Negative Final    Blood, UA 06/01/2023 Negative  Negative Final    Lot Number 06/01/2023 n   Final    Expiration Date 06/01/2023 n    Final        Procedure:   Cystoscopy:  Patient presents today for cystourethroscopy.  I went ahead and obtained an informed consent including the risks of anesthesia, bleeding, infection, etc.  After prepping and draping in a sterile fashion in the low dorsal lithotomy position, the urethra was gently anesthetized with 10 mL of 2% viscous Xylocaine jelly.  After an appropriate period of topical anesthesia, I used the Olympus digital 14 Cypriot flexible cystoscope to examine the anterior urethra which was completely normal.  The ureteral orifices were visualized and normal in position and configuration. There were no stones, tumors, or foreign bodies.  The blue light was enabled and was negative allowing us to see small mucosal lesions. The patient was given 80 mg of gentamicin in an intramuscular fashion as prophylaxis for the cystoscopy and released from the clinic.    Assessment/Plan:   Recurrent urinary tract infections-patient has been referred and diagnosed with recurrent urinary tract infections.  We discussed the types of organisms that are found in the urinary tract indicating that the vast majority are results of the patient's own gastrointestinal crystal.  We discussed how many of the antibiotics that are utilized can actually exacerbate these infections by creating resistant organisms and there is only very few antibiotics that are concentrated in the urine and do not affect the rectal reservoir nor cause recurrent yeast vaginitis.  We discussed the risk factors for recurrent infections being intercourse in younger patients and atrophic changes in older patients.  We discussed the symptoms that are found including pain, pressure, burning, frequency, urgency, suprapubic pain, and painful intercourse.  I discussed upper tract symptoms including fevers and chills and indicated the workup would be much more aggressive if the patient were to present with recurrent infections in the face of upper tract  symptomatology such as fever.  I discussed the history of vesicoureteral reflux in young patients and finally chronic renal scarring as a result of such.  I recommend concomitant probiotics with treatment with antibiotics to protect the rectal reservoir including over-the-counter yogurt preparations to jameson oral pills containing the appropriate probiotics.  Doing much better with prophylaxis          This document has been electronically signed by CICI DESAI MD June 1, 2023 10:37 EDT    Dictated Utilizing Dragon Dictation: Part of this note may be an electronic transcription/translation of spoken language to printed text using the Dragon Dictation System.

## 2023-06-02 LAB — BACTERIA SPEC AEROBE CULT: NO GROWTH

## 2023-08-01 ENCOUNTER — LAB (OUTPATIENT)
Dept: UROLOGY | Facility: CLINIC | Age: 48
End: 2023-08-01
Payer: COMMERCIAL

## 2023-08-01 DIAGNOSIS — N39.0 URINARY TRACT INFECTION WITHOUT HEMATURIA, SITE UNSPECIFIED: Primary | ICD-10-CM

## 2023-08-01 PROCEDURE — 87086 URINE CULTURE/COLONY COUNT: CPT | Performed by: NURSE PRACTITIONER

## 2023-08-02 ENCOUNTER — TELEPHONE (OUTPATIENT)
Dept: UROLOGY | Facility: CLINIC | Age: 48
End: 2023-08-02
Payer: COMMERCIAL

## 2023-08-02 LAB — BACTERIA SPEC AEROBE CULT: NO GROWTH

## 2023-08-08 ENCOUNTER — LAB (OUTPATIENT)
Dept: UROLOGY | Facility: CLINIC | Age: 48
End: 2023-08-08
Payer: COMMERCIAL

## 2023-08-08 DIAGNOSIS — N39.0 URINARY TRACT INFECTION WITHOUT HEMATURIA, SITE UNSPECIFIED: Primary | ICD-10-CM

## 2023-08-08 PROCEDURE — 87086 URINE CULTURE/COLONY COUNT: CPT | Performed by: NURSE PRACTITIONER

## 2023-08-09 ENCOUNTER — TELEPHONE (OUTPATIENT)
Dept: UROLOGY | Facility: CLINIC | Age: 48
End: 2023-08-09
Payer: COMMERCIAL

## 2023-08-09 LAB — BACTERIA SPEC AEROBE CULT: NORMAL

## 2023-08-09 NOTE — TELEPHONE ENCOUNTER
----- Message from Griselda Cheng-Akwa, APRN sent at 8/9/2023 12:39 PM EDT -----  Please can you let patient know her urine culture results showed mixed crystal, otherwise are negative for any bacterial infection at this time.  She may drop off another urine sample for repeat culture if she feels symptomatic, if not follow-up in clinic as discussed.    Thank you

## 2023-08-09 NOTE — TELEPHONE ENCOUNTER
Please can you let patient know her urine culture results showed mixed crystal, otherwise are negative for any bacterial infection at this time.  She may drop off another urine sample for repeat culture if she feels symptomatic, if not follow-up in clinic as discussed.    Thank you

## 2023-08-09 NOTE — TELEPHONE ENCOUNTER
Called patient to go over urine culture which showed no infection . Patient verbalized understanding.

## 2023-09-07 ENCOUNTER — HOSPITAL ENCOUNTER (OUTPATIENT)
Dept: MAMMOGRAPHY | Facility: HOSPITAL | Age: 48
Discharge: HOME | End: 2023-09-07
Admitting: RADIOLOGY
Payer: COMMERCIAL

## 2023-09-07 DIAGNOSIS — R92.8 ABNORMAL MAMMOGRAM: ICD-10-CM

## 2023-09-07 DIAGNOSIS — N64.4 BREAST PAIN: ICD-10-CM

## 2023-09-07 PROCEDURE — G0279 TOMOSYNTHESIS, MAMMO: HCPCS

## 2023-09-07 PROCEDURE — 77066 DX MAMMO INCL CAD BI: CPT

## 2023-12-11 ENCOUNTER — TELEPHONE (OUTPATIENT)
Dept: FAMILY MEDICINE CLINIC | Facility: CLINIC | Age: 48
End: 2023-12-11

## 2023-12-11 ENCOUNTER — HOSPITAL ENCOUNTER (OUTPATIENT)
Dept: CARDIOLOGY | Facility: HOSPITAL | Age: 48
Discharge: HOME | End: 2023-12-11
Admitting: NURSE PRACTITIONER
Payer: COMMERCIAL

## 2023-12-11 ENCOUNTER — OFFICE VISIT (OUTPATIENT)
Dept: FAMILY MEDICINE CLINIC | Facility: CLINIC | Age: 48
End: 2023-12-11
Payer: COMMERCIAL

## 2023-12-11 VITALS
BODY MASS INDEX: 26.9 KG/M2 | HEIGHT: 66 IN | SYSTOLIC BLOOD PRESSURE: 102 MMHG | HEART RATE: 61 BPM | TEMPERATURE: 97.8 F | OXYGEN SATURATION: 98 % | WEIGHT: 167.4 LBS | DIASTOLIC BLOOD PRESSURE: 70 MMHG

## 2023-12-11 DIAGNOSIS — D58.0 SPHEROCYTOSIS, HEREDITARY: Primary | ICD-10-CM

## 2023-12-11 DIAGNOSIS — D58.0 SPHEROCYTOSIS, HEREDITARY: ICD-10-CM

## 2023-12-11 DIAGNOSIS — L60.8 DISCOLORED NAILS: ICD-10-CM

## 2023-12-11 DIAGNOSIS — M79.672 LEFT FOOT PAIN: ICD-10-CM

## 2023-12-11 PROCEDURE — 93971 EXTREMITY STUDY: CPT

## 2023-12-11 PROCEDURE — 1159F MED LIST DOCD IN RCRD: CPT | Performed by: NURSE PRACTITIONER

## 2023-12-11 PROCEDURE — 1160F RVW MEDS BY RX/DR IN RCRD: CPT | Performed by: NURSE PRACTITIONER

## 2023-12-11 PROCEDURE — 99213 OFFICE O/P EST LOW 20 MIN: CPT | Performed by: NURSE PRACTITIONER

## 2023-12-11 RX ORDER — ASPIRIN 81 MG/1
81 TABLET ORAL DAILY
COMMUNITY

## 2023-12-11 NOTE — TELEPHONE ENCOUNTER
----- Message from OCTAVIA Roach sent at 12/11/2023  4:04 PM EST -----  Notify patient of NEG US results        Pt notified and expressed understanding.

## 2023-12-11 NOTE — PROGRESS NOTES
"Chief Complaint  Toe Pain (Big toe on left foot )    Subjective        Betty Powers presents to [facility]  Toe Pain       Patient with known spherocytosis with past HX clot presents today with 3 day hx left foot pain noted discoloration of the nail bed yesterday.    Objective   Vital Signs:  /70 (BP Location: Right arm, Patient Position: Sitting)   Pulse 61   Temp 97.8 °F (36.6 °C) (Temporal)   Ht 167.6 cm (66\")   Wt 75.9 kg (167 lb 6.4 oz)   SpO2 98%   BMI 27.02 kg/m²   Estimated body mass index is 27.02 kg/m² as calculated from the following:    Height as of this encounter: 167.6 cm (66\").    Weight as of this encounter: 75.9 kg (167 lb 6.4 oz).             Physical Exam  Vitals and nursing note reviewed.   Constitutional:       General: She is not in acute distress.     Appearance: She is well-developed. She is not ill-appearing.   HENT:      Head: Normocephalic.   Cardiovascular:      Rate and Rhythm: Normal rate and regular rhythm.      Heart sounds: Normal heart sounds. No murmur heard.  Pulmonary:      Effort: Pulmonary effort is normal.      Breath sounds: Normal breath sounds.   Skin:     General: Skin is warm and dry.   Neurological:      Mental Status: She is alert and oriented to person, place, and time.   Psychiatric:         Behavior: Behavior normal.        Result Review :  During this visit the following were done:  Labs Reviewed []    Labs Ordered []    Radiology Reports Reviewed []    Radiology Ordered [x]    PCP Records Reviewed []    Referring Provider Records Reviewed []    ER Records Reviewed []    Hospital Records Reviewed []    History Obtained From Family []    Radiology Images Reviewed []    Other Reviewed []    Records Requested []             Assessment and Plan   Diagnoses and all orders for this visit:    1. Spherocytosis, hereditary (Primary)  -     US Venous Doppler Lower Extremity Left (duplex); Future    2. Left foot pain  -     US " Venous Doppler Lower Extremity Left (duplex); Future    3. Discolored nails  -     US Venous Doppler Lower Extremity Left (duplex); Future    Reassurance with patient.  Await US results.  Continue with ASA           Follow Up   No follow-ups on file.  Patient was given instructions and counseling regarding her condition or for health maintenance advice. Please see specific information pulled into the AVS if appropriate.

## 2024-01-02 ENCOUNTER — OFFICE VISIT (OUTPATIENT)
Dept: CARDIOLOGY | Facility: CLINIC | Age: 49
End: 2024-01-02
Payer: COMMERCIAL

## 2024-01-02 VITALS
DIASTOLIC BLOOD PRESSURE: 80 MMHG | HEIGHT: 66 IN | OXYGEN SATURATION: 97 % | WEIGHT: 167 LBS | BODY MASS INDEX: 26.84 KG/M2 | HEART RATE: 60 BPM | SYSTOLIC BLOOD PRESSURE: 102 MMHG

## 2024-01-02 DIAGNOSIS — R00.2 PALPITATIONS: Primary | ICD-10-CM

## 2024-01-02 PROCEDURE — 99213 OFFICE O/P EST LOW 20 MIN: CPT | Performed by: INTERNAL MEDICINE

## 2024-01-02 RX ORDER — BORIC ACID
POWDER (GRAM) MISCELLANEOUS
COMMUNITY
Start: 2023-12-23

## 2024-01-02 NOTE — PROGRESS NOTES
"[facility]  Office visit  Betty Powers  1975  [phone]  There is no work phone number on file.    VISIT DATE:  1/2/2024    PCP: Kenya Rangel, OCTAVIA  [address] [place] [address]    CC:  Chief Complaint   Patient presents with    Follow-up     6 month       Previous cardiac studies and procedures:  December 2017   TTE: EF 60 to 65%, no valvular heart disease, color-flow suggestive of small left to right intra-atrial shunt, negative bubble study.  30-day monitor: Normal.    ASSESSMENT:   Diagnosis Plan   1. Palpitations            PLAN:  Palpitations: Currently asymptomatic.  Discussed obtaining a [employer] mobile device.    PFO with history of potential embolic event: Continue low-dose aspirin.    Subjective  Interval assessment: Episodes of palpitations have significantly improved.  Blood pressures running less than 120/80 mmHg.    Initial evaluation:47-year-old female with hereditary spherocytosis and previously diagnosed PFO presenting with increasing palpitations.  Describes brief fluttering sensations in her chest which causes her to cough.  At rest and with activity.  Is increasing in frequency over the past few weeks.  No obvious triggers.  Otherwise denies chest pain or dyspnea on exertion.  Blood pressures running less than 130/80 mmHg.  Had previously experienced an episode in which Nabeel her toes translated turn purple and she lost her nails on those toes.  She had an echo with a mildly positive bubble study after that, I have requested results of that evaluation.  She was placed on aspirin therapy after those episodes.  She was compliant with aspirin therapy until started develop some dyspepsia.     PHYSICAL EXAMINATION:  Vitals:    01/02/24 1503   BP: 102/80   BP Location: Right arm   Patient Position: Sitting   Pulse: 60   SpO2: 97%   Weight: 75.8 kg (167 lb)   Height: 167.6 cm (66\")     General Appearance:    Alert, cooperative, no distress, appears " "stated age   Head:    Normocephalic, without obvious abnormality, atraumatic   Eyes:    conjunctiva/corneas clear   Nose:   Nares normal, septum midline, mucosa normal, no drainage   Throat:   Lips, teeth and gums normal   Neck:   Supple, symmetrical, trachea midline, no carotid    bruit or JVD   Lungs:     Clear to auscultation bilaterally, respirations unlabored   Chest Wall:    No tenderness or deformity    Heart:    Regular rate and rhythm, S1 and S2 normal, no murmur, rub   or gallop, normal carotid impulse bilaterally without bruit.   Abdomen:     Soft, non-tender   Extremities:   Extremities normal, atraumatic, no cyanosis or edema   Pulses:   2+ and symmetric all extremities   Skin:   Skin color, texture, turgor normal, no rashes or lesions       Diagnostic Data:  Procedures  Lab Results   Component Value Date    TRIG 76 04/22/2021    HDL 44 04/22/2021     Lab Results   Component Value Date    GLUCOSE 112 (H) 02/06/2023    BUN 6 02/06/2023    CREATININE 0.66 02/06/2023     02/06/2023    K 3.3 (L) 02/06/2023     02/06/2023    CO2 25.6 02/06/2023     No results found for: \"HGBA1C\"  Lab Results   Component Value Date    WBC 6.55 02/06/2023    HGB 10.9 (L) 02/06/2023    HCT 29.8 (L) 02/06/2023     02/06/2023       Allergies  No Known Allergies    Current Medications    Current Outpatient Medications:     aspirin 81 MG EC tablet, Take 1 tablet by mouth Daily., Disp: , Rfl:     Boric Acid suppository Suppository, Every 30 (Thirty) Days., Disp: , Rfl:     Probiotic Product (PROBIOTIC ADVANCED PO), Take  by mouth Daily., Disp: , Rfl:           ROS  ROS      SOCIAL HX  Social History     Socioeconomic History    Marital status:    Tobacco Use    Smoking status: Never    Smokeless tobacco: Never   Vaping Use    Vaping Use: Never used   Substance and Sexual Activity    Alcohol use: No    Drug use: No    Sexual activity: Defer     Birth control/protection: Surgical       FAMILY HX  Family " History   Problem Relation Age of Onset    Heart failure Mother     Heart attack Mother     Cancer Mother     Hypertension Mother     Heart attack Father     Hypertension Father     Heart disease Father     No Known Problems Sister     No Known Problems Brother     Bell's palsy Brother     Breast cancer Neg Hx              Michael Alvarez III, MD, FACC

## 2024-01-09 ENCOUNTER — OFFICE VISIT (OUTPATIENT)
Dept: FAMILY MEDICINE CLINIC | Facility: CLINIC | Age: 49
End: 2024-01-09
Payer: COMMERCIAL

## 2024-01-09 VITALS
HEART RATE: 73 BPM | DIASTOLIC BLOOD PRESSURE: 70 MMHG | HEIGHT: 66 IN | WEIGHT: 162.8 LBS | BODY MASS INDEX: 26.16 KG/M2 | OXYGEN SATURATION: 95 % | TEMPERATURE: 98 F | SYSTOLIC BLOOD PRESSURE: 104 MMHG

## 2024-01-09 DIAGNOSIS — D58.0 SPHEROCYTOSIS, HEREDITARY: Primary | ICD-10-CM

## 2024-01-09 DIAGNOSIS — L60.8 DISCOLORATION OF NAILBEDS: ICD-10-CM

## 2024-01-09 PROCEDURE — 99213 OFFICE O/P EST LOW 20 MIN: CPT | Performed by: NURSE PRACTITIONER

## 2024-01-09 PROCEDURE — 1160F RVW MEDS BY RX/DR IN RCRD: CPT | Performed by: NURSE PRACTITIONER

## 2024-01-09 PROCEDURE — 1159F MED LIST DOCD IN RCRD: CPT | Performed by: NURSE PRACTITIONER

## 2024-01-10 NOTE — PROGRESS NOTES
"Chief Complaint  Foot Problem    Subjective        Betty Powers presents to [facility]  Toe Pain   The incident occurred 3 to 5 days ago (HX clotting disorder and Previous clot of toe). There was no injury mechanism. Pain location: Right great toe. The pain is at a severity of 4/10. The pain is mild. The pain has been Constant since onset. Pertinent negatives include no inability to bear weight, loss of motion, loss of sensation, muscle weakness, numbness or tingling. She reports no foreign bodies present. She has tried nothing for the symptoms.     Patient with known spherocytosis with past HX clot presents today with 3 day hx left foot pain noted discoloration of the nail bed yesterday.    Objective   Vital Signs:  /70 (BP Location: Right arm, Patient Position: Sitting)   Pulse 73   Temp 98 °F (36.7 °C) (Temporal)   Ht 167.6 cm (66\")   Wt 73.8 kg (162 lb 12.8 oz)   SpO2 95%   BMI 26.28 kg/m²   Estimated body mass index is 26.28 kg/m² as calculated from the following:    Height as of this encounter: 167.6 cm (66\").    Weight as of this encounter: 73.8 kg (162 lb 12.8 oz).             Physical Exam  Vitals and nursing note reviewed.   Constitutional:       General: She is not in acute distress.     Appearance: She is well-developed. She is not ill-appearing.   HENT:      Head: Normocephalic.   Cardiovascular:      Rate and Rhythm: Normal rate and regular rhythm.      Heart sounds: Normal heart sounds. No murmur heard.  Pulmonary:      Effort: Pulmonary effort is normal.      Breath sounds: Normal breath sounds.   Skin:     General: Skin is warm and dry.      Comments: Discoloration of right great nail    Neurological:      Mental Status: She is alert and oriented to person, place, and time.   Psychiatric:         Behavior: Behavior normal.        Result Review :  During this visit the following were done:  Labs Reviewed []    Labs Ordered []    Radiology Reports " Reviewed []    Radiology Ordered [x]    PCP Records Reviewed []    Referring Provider Records Reviewed []    ER Records Reviewed []    Hospital Records Reviewed []    History Obtained From Family []    Radiology Images Reviewed []    Other Reviewed []    Records Requested []             Assessment and Plan   Diagnoses and all orders for this visit:    1. Spherocytosis, hereditary (Primary)  -     US Venous Doppler Lower Extremity Right (duplex); Future    2. Discoloration of nailbeds  -     US Venous Doppler Lower Extremity Right (duplex); Future    Reassurance with patient.  Await US results.  Continue with ASA           Follow Up   Return if symptoms worsen or fail to improve.  Patient was given instructions and counseling regarding her condition or for health maintenance advice. Please see specific information pulled into the AVS if appropriate.

## 2024-01-11 ENCOUNTER — HOSPITAL ENCOUNTER (OUTPATIENT)
Dept: CARDIOLOGY | Facility: HOSPITAL | Age: 49
Discharge: HOME | End: 2024-01-11
Payer: COMMERCIAL

## 2024-01-11 DIAGNOSIS — L60.8 DISCOLORATION OF NAILBEDS: ICD-10-CM

## 2024-01-11 DIAGNOSIS — D58.0 SPHEROCYTOSIS, HEREDITARY: ICD-10-CM

## 2024-01-11 PROCEDURE — 93971 EXTREMITY STUDY: CPT

## 2024-01-15 ENCOUNTER — TELEPHONE (OUTPATIENT)
Dept: FAMILY MEDICINE CLINIC | Facility: CLINIC | Age: 49
End: 2024-01-15
Payer: COMMERCIAL

## 2024-01-15 NOTE — TELEPHONE ENCOUNTER
----- Message from OCTAVIA Roach sent at 1/15/2024 10:26 AM EST -----  Let patient know scan is NEG

## 2024-02-20 ENCOUNTER — OFFICE VISIT (OUTPATIENT)
Dept: FAMILY MEDICINE CLINIC | Facility: CLINIC | Age: 49
End: 2024-02-20
Payer: COMMERCIAL

## 2024-02-20 VITALS
BODY MASS INDEX: 26.55 KG/M2 | DIASTOLIC BLOOD PRESSURE: 70 MMHG | SYSTOLIC BLOOD PRESSURE: 108 MMHG | OXYGEN SATURATION: 99 % | HEART RATE: 94 BPM | HEIGHT: 66 IN | WEIGHT: 165.2 LBS | TEMPERATURE: 98 F

## 2024-02-20 DIAGNOSIS — J02.9 SORE THROAT: Primary | ICD-10-CM

## 2024-02-20 DIAGNOSIS — D58.0 SPHEROCYTOSIS, HEREDITARY: ICD-10-CM

## 2024-02-20 LAB
EXPIRATION DATE: NORMAL
INTERNAL CONTROL: NORMAL
Lab: NORMAL
S PYO AG THROAT QL: NEGATIVE

## 2024-02-20 PROCEDURE — 87880 STREP A ASSAY W/OPTIC: CPT | Performed by: NURSE PRACTITIONER

## 2024-02-20 PROCEDURE — 99213 OFFICE O/P EST LOW 20 MIN: CPT | Performed by: NURSE PRACTITIONER

## 2024-02-20 RX ORDER — AMOXICILLIN 875 MG/1
875 TABLET, COATED ORAL 2 TIMES DAILY
Qty: 20 TABLET | Refills: 0 | Status: SHIPPED | OUTPATIENT
Start: 2024-02-20

## 2024-02-20 NOTE — PROGRESS NOTES
"Chief Complaint  Earache and Sore Throat    Subjective        Betty Powers presents to [facility]  Sore Throat   This is a new problem. The current episode started in the past 7 days. The problem has been unchanged.   Pain is worse on left side(s). There has been no fever. The fever has been present for Less than 1 day. The pain is at a severity of 4/10. The pain is moderate. Associated symptoms include ear pain and a hoarse voice. Pertinent negatives include no coughing, shortness of breath or trouble swallowing. She has tried cool liquids and NSAIDs for the symptoms. The treatment provided mild relief.       Objective   Vital Signs:  /70 (BP Location: Right arm, Patient Position: Sitting)   Pulse 94   Temp 98 °F (36.7 °C)   Ht 167.6 cm (66\")   Wt 74.9 kg (165 lb 3.2 oz)   SpO2 99%   BMI 26.66 kg/m²   Estimated body mass index is 26.66 kg/m² as calculated from the following:    Height as of this encounter: 167.6 cm (66\").    Weight as of this encounter: 74.9 kg (165 lb 3.2 oz).             Physical Exam  Vitals and nursing note reviewed.   Constitutional:       General: She is not in acute distress.     Appearance: She is well-developed. She is not ill-appearing.   HENT:      Head: Normocephalic.      Mouth/Throat:      Pharynx: Posterior oropharyngeal erythema present.      Comments: Posterior petechiae    Cardiovascular:      Rate and Rhythm: Normal rate and regular rhythm.      Heart sounds: Normal heart sounds. No murmur heard.  Pulmonary:      Effort: Pulmonary effort is normal.      Breath sounds: Normal breath sounds.   Skin:     General: Skin is warm and dry.   Neurological:      Mental Status: She is alert and oriented to person, place, and time.   Psychiatric:         Behavior: Behavior normal.        Result Review :  During this visit the following were done:  Labs Reviewed [x]    Labs Ordered [x]    Radiology Reports Reviewed []    Radiology Ordered []  "   PCP Records Reviewed []    Referring Provider Records Reviewed []    ER Records Reviewed []    Hospital Records Reviewed []    History Obtained From Family []    Radiology Images Reviewed []    Other Reviewed []    Records Requested []                   Assessment and Plan   Diagnoses and all orders for this visit:    1. Sore throat (Primary)  -     POCT rapid strep A  -     amoxicillin (AMOXIL) 875 MG tablet; Take 1 tablet by mouth 2 (Two) Times a Day.  Dispense: 20 tablet; Refill: 0    2. Spherocytosis, hereditary  Schedule with hematology              Follow Up     No follow-ups on file.    Patient was given instructions and counseling regarding her condition or for health maintenance advice. Please see specific information pulled into the AVS if appropriate.       This document has been electronically signed by OCTAVIA Roach   February 20, 2024 15:05 EST

## 2024-04-15 ENCOUNTER — TELEPHONE (OUTPATIENT)
Dept: CARDIOLOGY | Facility: CLINIC | Age: 49
End: 2024-04-15
Payer: COMMERCIAL

## 2024-04-15 NOTE — TELEPHONE ENCOUNTER
04/15/2024 AT 4:21 PM    SPOKE WITH PT IN REGARDS TO UPCOMING APPOINTMENT TO BE LOCATED IN THE [facility] ADDRESS.

## 2024-07-09 ENCOUNTER — OFFICE VISIT (OUTPATIENT)
Dept: FAMILY MEDICINE CLINIC | Facility: CLINIC | Age: 49
End: 2024-07-09
Payer: COMMERCIAL

## 2024-07-09 VITALS
SYSTOLIC BLOOD PRESSURE: 124 MMHG | HEIGHT: 66 IN | WEIGHT: 161.4 LBS | OXYGEN SATURATION: 97 % | BODY MASS INDEX: 25.94 KG/M2 | HEART RATE: 74 BPM | DIASTOLIC BLOOD PRESSURE: 78 MMHG | TEMPERATURE: 97.5 F

## 2024-07-09 DIAGNOSIS — H65.03 NON-RECURRENT ACUTE SEROUS OTITIS MEDIA OF BOTH EARS: Primary | ICD-10-CM

## 2024-07-09 DIAGNOSIS — D58.0 SPHEROCYTOSIS, HEREDITARY: ICD-10-CM

## 2024-07-09 PROCEDURE — 1125F AMNT PAIN NOTED PAIN PRSNT: CPT | Performed by: NURSE PRACTITIONER

## 2024-07-09 PROCEDURE — 99213 OFFICE O/P EST LOW 20 MIN: CPT | Performed by: NURSE PRACTITIONER

## 2024-07-09 RX ORDER — FLUTICASONE PROPIONATE 50 MCG
2 SPRAY, SUSPENSION (ML) NASAL DAILY
Qty: 9.9 ML | Refills: 11 | Status: SHIPPED | OUTPATIENT
Start: 2024-07-09

## 2024-07-09 RX ORDER — LORATADINE AND PSEUDOEPHEDRINE SULFATE 5; 120 MG/1; MG/1
1 TABLET, EXTENDED RELEASE ORAL 2 TIMES DAILY
Qty: 30 TABLET | Refills: 0 | Status: SHIPPED | OUTPATIENT
Start: 2024-07-09

## 2024-07-09 NOTE — PROGRESS NOTES
"Chief Complaint  Earache (Both ears )    Subjective        Betty Powers presents to [facility]  Earache   There is pain in both ears. This is a new problem. The current episode started yesterday. The problem occurs constantly. The problem has been unchanged. There has been no fever. The pain is at a severity of 3/10. The pain is mild. She has tried nothing for the symptoms.       Objective   Vital Signs:  /78 (BP Location: Right arm, Patient Position: Sitting)   Pulse 74   Temp 97.5 °F (36.4 °C) (Temporal)   Ht 167.6 cm (66\")   Wt 73.2 kg (161 lb 6.4 oz)   SpO2 97%   BMI 26.05 kg/m²   Estimated body mass index is 26.05 kg/m² as calculated from the following:    Height as of this encounter: 167.6 cm (66\").    Weight as of this encounter: 73.2 kg (161 lb 6.4 oz).             Physical Exam  Vitals and nursing note reviewed.   Constitutional:       General: She is not in acute distress.     Appearance: She is well-developed. She is not ill-appearing.   HENT:      Head: Normocephalic.      Right Ear: A middle ear effusion is present. Tympanic membrane is bulging.      Left Ear: A middle ear effusion is present. Tympanic membrane is bulging.   Cardiovascular:      Rate and Rhythm: Normal rate and regular rhythm.      Heart sounds: Normal heart sounds. No murmur heard.  Pulmonary:      Effort: Pulmonary effort is normal.      Breath sounds: Normal breath sounds.   Skin:     General: Skin is warm and dry.   Neurological:      Mental Status: She is alert and oriented to person, place, and time.   Psychiatric:         Behavior: Behavior normal.        Result Review :                   Assessment and Plan   Diagnoses and all orders for this visit:    1. Non-recurrent acute serous otitis media of both ears (Primary)  -     loratadine-pseudoephedrine (Claritin-D 12 Hour) 5-120 MG per 12 hr tablet; Take 1 tablet by mouth 2 (Two) Times a Day.  Dispense: 30 tablet; Refill: 0  -     " fluticasone (FLONASE) 50 MCG/ACT nasal spray; 2 sprays into the nostril(s) as directed by provider Daily.  Dispense: 9.9 mL; Refill: 11    2. Spherocytosis, hereditary    RTC if any new or worsening concerns           Follow Up     Return if symptoms worsen or fail to improve, for Recheck.    Patient was given instructions and counseling regarding her condition or for health maintenance advice. Please see specific information pulled into the AVS if appropriate.       This document has been electronically signed by OCTAVIA Roach   July 9, 2024 13:23 EDT

## 2024-09-26 NOTE — TELEPHONE ENCOUNTER
Pt called to go over her urine culture. Spoke to Dr Foreman, he does not want to treat such bacteria.   Procedure To Be Performed At Next Visit: Excision Introduction Text (Please End With A Colon): The following procedure was deferred: Size Of Lesion In Cm (Optional): 0.9 Instructions (Optional): Will refer patient to Dr. Luther for excision.  Informed patient Dr. Luther does not have any openings until after she returns from maternity leave in early 2025.  Patient is okay with waiting until then Detail Level: Detailed X Size Of Lesion In Cm (Optional): 0

## 2025-02-12 ENCOUNTER — TELEPHONE (OUTPATIENT)
Dept: FAMILY MEDICINE CLINIC | Facility: CLINIC | Age: 50
End: 2025-02-12

## 2025-02-12 ENCOUNTER — OFFICE VISIT (OUTPATIENT)
Dept: FAMILY MEDICINE CLINIC | Facility: CLINIC | Age: 50
End: 2025-02-12
Payer: COMMERCIAL

## 2025-02-12 VITALS
HEART RATE: 64 BPM | OXYGEN SATURATION: 99 % | TEMPERATURE: 97.1 F | SYSTOLIC BLOOD PRESSURE: 122 MMHG | WEIGHT: 163.2 LBS | DIASTOLIC BLOOD PRESSURE: 78 MMHG | HEIGHT: 66 IN | BODY MASS INDEX: 26.23 KG/M2

## 2025-02-12 DIAGNOSIS — N76.1 CHRONIC VAGINITIS: ICD-10-CM

## 2025-02-12 DIAGNOSIS — R30.0 DYSURIA: Primary | ICD-10-CM

## 2025-02-12 PROCEDURE — 1159F MED LIST DOCD IN RCRD: CPT | Performed by: NURSE PRACTITIONER

## 2025-02-12 PROCEDURE — 99214 OFFICE O/P EST MOD 30 MIN: CPT | Performed by: NURSE PRACTITIONER

## 2025-02-12 PROCEDURE — 87086 URINE CULTURE/COLONY COUNT: CPT | Performed by: NURSE PRACTITIONER

## 2025-02-12 PROCEDURE — 1125F AMNT PAIN NOTED PAIN PRSNT: CPT | Performed by: NURSE PRACTITIONER

## 2025-02-12 PROCEDURE — 1160F RVW MEDS BY RX/DR IN RCRD: CPT | Performed by: NURSE PRACTITIONER

## 2025-02-12 RX ORDER — SULFAMETHOXAZOLE AND TRIMETHOPRIM 800; 160 MG/1; MG/1
1 TABLET ORAL 2 TIMES DAILY
Qty: 6 TABLET | Refills: 0 | Status: SHIPPED | OUTPATIENT
Start: 2025-02-12

## 2025-02-12 RX ORDER — BORIC ACID
600 POWDER (GRAM) MISCELLANEOUS NIGHTLY
Qty: 10 SUPPOSITORY | Refills: 11 | Status: SHIPPED | OUTPATIENT
Start: 2025-02-12

## 2025-02-12 RX ORDER — FLUCONAZOLE 150 MG/1
150 TABLET ORAL ONCE
Qty: 1 TABLET | Refills: 0 | Status: SHIPPED | OUTPATIENT
Start: 2025-02-12 | End: 2025-02-12

## 2025-02-12 NOTE — TELEPHONE ENCOUNTER
Caller: Betty Powers    Relationship: Self    Best call back number: [phone]    What medication are you requesting: ANTIBIOTIC    What are your current symptoms: PAIN WHEN URINATE AND BURNING    How long have you been experiencing symptoms:   FRIDAY  If a prescription is needed, what is your preferred pharmacy and phone number:      [place], [place] - [address] [phone] [facility] [phone] FX

## 2025-02-12 NOTE — PROGRESS NOTES
"Chief Complaint   Dysuria     Subjective          Bettytaj Powers presents to [facility]   Dysuria        History of Present Illness  The patient is a 49-year-old female who presents with a urinary tract infection (UTI).    She reports experiencing severe pain associated with the UTI, which began last week. She thought about going to the emergency care the previous night due to the intensity of the symptoms. She describes a persistent sensation that never fully resolves, but currently, it has exacerbated.    She has a history of recurrent bacterial vaginosis. She typically uses boric acid suppositories as a monthly treatment regimen. However, she has exhausted her supply and is unable to obtain more at this time.    Supplemental Information  She is going for a laser treatment on her face tomorrow for her rosacea.    ALLERGIES  The patient has no known allergies.    MEDICATIONS  Current: Boric acid suppositories       Review of Systems   Genitourinary:  Positive for dysuria.          Objective    Vital Signs:   /78 (BP Location: Right arm, Patient Position: Sitting)   Pulse 64   Temp 97.1 °F (36.2 °C) (Temporal)   Ht 167.6 cm (66\")   Wt 74 kg (163 lb 3.2 oz)   SpO2 99%   BMI 26.34 kg/m²     Physical Exam           Physical Exam  Vitals and nursing note reviewed.   Constitutional:       General: She is not in acute distress.     Appearance: She is well-developed. She is not ill-appearing.   Cardiovascular:      Rate and Rhythm: Normal rate and regular rhythm.      Heart sounds: Normal heart sounds. No murmur heard.  Pulmonary:      Effort: Pulmonary effort is normal.      Breath sounds: Normal breath sounds.   Abdominal:      General: Bowel sounds are normal.      Palpations: Abdomen is soft.      Tenderness: There is abdominal tenderness.   Skin:     General: Skin is warm and dry.   Neurological:      Mental Status: She is alert and oriented to person, place, and time. "   Psychiatric:         Behavior: Behavior normal.        Result Review :  During this visit the following were done:  Labs Reviewed [x]    Labs Ordered [x]    Radiology Reports Reviewed []    Radiology Ordered []    PCP Records Reviewed []    Referring Provider Records Reviewed []    ER Records Reviewed []    Hospital Records Reviewed []    History Obtained From Family []    Radiology Images Reviewed []    Other Reviewed []    Records Requested []          Results               Assessment and Plan    Diagnoses and all orders for this visit:    1. Dysuria (Primary)  -     POCT urinalysis dipstick, automated  -     Urine Culture - Urine, Urine, Clean Catch; Future  -     fluconazole (Diflucan) 150 MG tablet; Take 1 tablet by mouth 1 (One) Time for 1 dose.  Dispense: 1 tablet; Refill: 0  -     sulfamethoxazole-trimethoprim (Bactrim DS) 800-160 MG per tablet; Take 1 tablet by mouth 2 (Two) Times a Day.  Dispense: 6 tablet; Refill: 0    2. Chronic vaginitis  -     Boric Acid suppository Suppository; Insert 1 suppository into the vagina Every Night.  Dispense: 10 suppository; Refill: 11       Assessment & Plan  1. Urinary Tract Infection (UTI).  A prescription for Diflucan has been issued to manage the UTI. Additionally, a culture will be sent for further analysis. The prescription will be sent to [facility] pharmacy.    2. Bacterial Vaginosis.  She reports a history of bacterial vaginosis and uses boric acid suppositories monthly. A prescription for boric acid 600 mg suppositories has been sent to [facility] pharmacy. She is advised to use one suppository every 30 days.         Follow Up    No follow-ups on file.   Patient was given instructions and counseling regarding her condition or for health maintenance advice. Please see specific information pulled into the AVS if appropriate.           This document has been electronically signed by OCTAVIA Roach  February 12, 2025 13:42 EST    Patient or patient representative  verbalized consent for the use of Ambient Listening during the visit with  OCTAVIA Roach for chart documentation. 2/12/2025  13:42 EST

## 2025-02-14 LAB — BACTERIA SPEC AEROBE CULT: NO GROWTH

## 2025-07-09 ENCOUNTER — TELEPHONE (OUTPATIENT)
Dept: FAMILY MEDICINE CLINIC | Facility: CLINIC | Age: 50
End: 2025-07-09
Payer: COMMERCIAL

## 2025-07-09 NOTE — TELEPHONE ENCOUNTER
Caller: Betty Powers    Relationship: Self    Best call back number: [phone]    What is the medical concern/diagnosis: BLOOD DISORDER    What specialty or service is being requested: HEMATOLOGY    What is the provider, practice or medical service name: [facility]    What is the office location: [id number]    What is the office phone number: [phone]    Any additional details: SHE WOULD LIKE TO BE REFERRED TO HER, HER MOM & DAUGHTER SEE HER.

## 2025-07-14 DIAGNOSIS — D58.0 SPHEROCYTOSIS, HEREDITARY: Primary | ICD-10-CM

## 2025-07-15 NOTE — TELEPHONE ENCOUNTER
I spoke with the pt. 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     OCTAVIA Lou

## (undated) DEVICE — SUT MNCRYL 4/0 PS2 18 IN

## (undated) DEVICE — ANTIBACTERIAL VIOLET BRAIDED (POLYGLACTIN 910), SYNTHETIC ABSORBABLE SURGICAL SUTURE: Brand: COATED VICRYL

## (undated) DEVICE — SUCTION CANISTER, 1500CC, RIGID: Brand: DEROYAL

## (undated) DEVICE — COVER,MAYO STAND,STERILE: Brand: MEDLINE

## (undated) DEVICE — CANNULA SEAL

## (undated) DEVICE — PK DAVINCI 70

## (undated) DEVICE — Device

## (undated) DEVICE — PAD GRND REM POLYHESIVE A/ DISP

## (undated) DEVICE — SEAL CANN CAM ENDOWRIST DAVINCI/S 8.5MM

## (undated) DEVICE — 2, DISPOSABLE SUCTION/IRRIGATOR WITH DISPOSABLE TIP: Brand: STRYKEFLOW

## (undated) DEVICE — 3M™ STERI-STRIP™ REINFORCED ADHESIVE SKIN CLOSURES, R1547, 1/2 IN X 4 IN (12 MM X 100 MM), 6 STRIPS/ENVELOPE: Brand: 3M™ STERI-STRIP™

## (undated) DEVICE — PAD SANI MAXI W/ADHS SNG WRP 11IN

## (undated) DEVICE — ENDOPATH XCEL BLADELESS TROCARS WITH STABILITY SLEEVES: Brand: ENDOPATH XCEL

## (undated) DEVICE — LAPAROSCOPIC SCOPE WARMER: Brand: DEROYAL

## (undated) DEVICE — APPL CHLORAPREP W/TINT 26ML ORNG

## (undated) DEVICE — OBT BLADLES ENDOWRIST DAVINCI/S 8MM

## (undated) DEVICE — ENCORE® LATEX MICRO SIZE 8, STERILE LATEX POWDER-FREE SURGICAL GLOVE: Brand: ENCORE

## (undated) DEVICE — MANIP UTER ADVINCULA DELINEATOR W/ 4CM ULTEM PLSTC CUP

## (undated) DEVICE — BNDG ADHS CURAD FLX/FABRC 2X4IN STRL LF

## (undated) DEVICE — TIP COVER ACCESSORY

## (undated) DEVICE — SAFESECURE,SECUREMENT,FOLEY CATH,STERILE: Brand: MEDLINE

## (undated) DEVICE — 40595 XL TRENDELENBURG POSITIONING KIT: Brand: 40595 XL TRENDELENBURG POSITIONING KIT